# Patient Record
Sex: FEMALE | Race: WHITE | NOT HISPANIC OR LATINO | Employment: OTHER | URBAN - METROPOLITAN AREA
[De-identification: names, ages, dates, MRNs, and addresses within clinical notes are randomized per-mention and may not be internally consistent; named-entity substitution may affect disease eponyms.]

---

## 2017-02-03 ENCOUNTER — HOSPITAL ENCOUNTER (OUTPATIENT)
Dept: CT IMAGING | Facility: HOSPITAL | Age: 82
Discharge: HOME | End: 2017-02-03
Attending: INTERNAL MEDICINE | Admitting: INTERNAL MEDICINE

## 2017-02-03 ENCOUNTER — TRANSCRIBE ORDERS (OUTPATIENT)
Dept: ADMINISTRATIVE | Facility: HOSPITAL | Age: 82
End: 2017-02-03

## 2017-02-03 DIAGNOSIS — S09.90XA HEAD TRAUMA, INITIAL ENCOUNTER: ICD-10-CM

## 2017-02-03 DIAGNOSIS — S09.90XA HEAD TRAUMA, INITIAL ENCOUNTER: Primary | ICD-10-CM

## 2017-02-03 PROCEDURE — 70450 CT HEAD/BRAIN W/O DYE: CPT

## 2017-02-03 NOTE — NURSING NOTE
Spoke with Dr. Painter regarding results of patients head CT. Results called to Dr. Dempsey. Dr. Dempsey stated patient may leave and for her to be careful. Info relayed to patient, home per self. No distress.

## 2017-06-08 ENCOUNTER — HOSPITAL ENCOUNTER (OUTPATIENT)
Dept: GENERAL RADIOLOGY | Facility: HOSPITAL | Age: 82
Discharge: HOME | End: 2017-06-08
Admitting: INTERNAL MEDICINE

## 2017-06-08 DIAGNOSIS — R52 PAIN: ICD-10-CM

## 2017-06-08 PROCEDURE — 73030 X-RAY EXAM OF SHOULDER: CPT

## 2017-09-01 ENCOUNTER — OFFICE VISIT (OUTPATIENT)
Dept: CARDIOLOGY | Facility: CLINIC | Age: 82
End: 2017-09-01

## 2017-09-01 VITALS
HEART RATE: 73 BPM | DIASTOLIC BLOOD PRESSURE: 78 MMHG | HEIGHT: 60 IN | SYSTOLIC BLOOD PRESSURE: 150 MMHG | WEIGHT: 143 LBS | BODY MASS INDEX: 28.07 KG/M2

## 2017-09-01 DIAGNOSIS — R06.09 DYSPNEA ON EXERTION: Primary | ICD-10-CM

## 2017-09-01 DIAGNOSIS — A15.9 TB (TUBERCULOSIS): ICD-10-CM

## 2017-09-01 DIAGNOSIS — I35.0 NONRHEUMATIC AORTIC VALVE STENOSIS: Chronic | ICD-10-CM

## 2017-09-01 PROCEDURE — 93000 ELECTROCARDIOGRAM COMPLETE: CPT | Performed by: INTERNAL MEDICINE

## 2017-09-01 PROCEDURE — 99203 OFFICE O/P NEW LOW 30 MIN: CPT | Performed by: INTERNAL MEDICINE

## 2017-09-01 NOTE — PROGRESS NOTES
Subjective:     Encounter Date:09/01/2017      Patient ID: Shaina Mcknight is a 89 y.o. female.    Chief Complaint:  History of Present Illness    Dear Dr. Shankar,    I had the pleasure seeing this patient in the office today for consultation regarding dyspnea.  I appreciate the you sent her to see us.    She was apparently seen in the office years ago but we could find no records she is not sure how many years back that was.  She states at that time she was told everything looks okay.    She's been having some more issues with shortness of breath.  She states that if she does things and tries to walk around she gets short of breath.  It's gradually been getting worse.  She has no associated chest pain, pressure, tightness, squeezing, or heartburn.  She's not have children.  She has had an occasional cough.  She has a history of tuberculosis in the past.    She really was discussing this with you back in November and you arrange for a stress echocardiogram to be performed.  This was a normal study that simply demonstrated mild aortic stenosis:  Interpretation Summary   · Normal stress echo with no significant echocardiographic evidence for myocardial ischemia.  · Mild aortic valve stenosis is present.         This patient has no known cardiac history.  This patient has no history of coronary artery disease, congestive heart failure, rheumatic fever, rheumatic heart disease, congenital heart disease or arrhythmia.  This patient has never required invasive cardiovascular evaluation.    She denies any lower extremity edema.  No orthopnea or PND.  No spells of heart racing or palpitations.  No dizziness or lightheadedness, presyncope or syncope.    The following portions of the patient's history were reviewed and updated as appropriate: allergies, current medications, past family history, past medical history, past social history, past surgical history and problem list.    Past Medical History:   Diagnosis Date    • Acute pain of left shoulder due to trauma    • Aortic valve regurgitation     trace   • Aortic valve stenosis     mild   • SPENCER (dyspnea on exertion)    • Fall     going up the steps   • Heart murmur    • Mitral valve regurgitation     mild to moderate   • Pulmonary valve regurgitation     trace   • Tricuspid valve regurgitation     mild to moderate       Past Surgical History:   Procedure Laterality Date   • CATARACT EXTRACTION Bilateral    • HYSTERECTOMY         Social History     Social History   • Marital status:      Spouse name: N/A   • Number of children: N/A   • Years of education: N/A     Occupational History   • Not on file.     Social History Main Topics   • Smoking status: Never Smoker   • Smokeless tobacco: Not on file      Comment: caffine use   • Alcohol use 0.6 oz/week     1 Glasses of wine per week      Comment: daily   • Drug use: No   • Sexual activity: Not on file     Other Topics Concern   • Not on file     Social History Narrative       Review of Systems   Constitution: Negative for chills, decreased appetite, fever and night sweats.   HENT: Negative for ear discharge, ear pain, hearing loss, nosebleeds and sore throat.    Eyes: Negative for blurred vision, double vision and pain.   Cardiovascular: Negative for cyanosis.   Respiratory: Negative for hemoptysis and sputum production.    Endocrine: Negative for cold intolerance and heat intolerance.   Hematologic/Lymphatic: Negative for adenopathy.   Skin: Negative for dry skin, itching, nail changes, rash and suspicious lesions.   Musculoskeletal: Negative for arthritis, gout, muscle cramps, muscle weakness, myalgias and neck pain.   Gastrointestinal: Negative for anorexia, bowel incontinence, constipation, diarrhea, dysphagia, hematemesis and jaundice.   Genitourinary: Negative for bladder incontinence, dysuria, flank pain, frequency, hematuria and nocturia.   Neurological: Negative for focal weakness, numbness, paresthesias and  "seizures.   Psychiatric/Behavioral: Negative for altered mental status, hallucinations, hypervigilance, suicidal ideas and thoughts of violence.   Allergic/Immunologic: Negative for persistent infections.         ECG 12 Lead  Date/Time: 9/1/2017 9:58 AM  Performed by: WILD WOLF III.  Authorized by: WILD WOLF III   Comparison: compared with previous ECG   Similar to previous ECG  Rhythm: sinus rhythm  Rate: normal  Conduction: conduction normal  ST Segments: ST segments normal  T Waves: T waves normal  QRS axis: normal  Other: no other findings  Clinical impression: normal ECG               Objective:     Vitals:    09/01/17 0837   BP: 150/78   Pulse: 73   Weight: 143 lb (64.9 kg)   Height: 60\" (152.4 cm)         Physical Exam   Constitutional: She is oriented to person, place, and time. She appears well-developed and well-nourished. No distress.   HENT:   Head: Normocephalic and atraumatic.   Nose: Nose normal.   Mouth/Throat: Oropharynx is clear and moist.   Eyes: Conjunctivae and EOM are normal. Pupils are equal, round, and reactive to light. Right eye exhibits no discharge. Left eye exhibits no discharge.   Neck: Normal range of motion. Neck supple. No tracheal deviation present. No thyromegaly present.   Cardiovascular: Normal rate, regular rhythm, S1 normal, S2 normal and normal pulses.  Exam reveals no S3.    Murmur heard.   Harsh midsystolic murmur is present with a grade of 1/6  at the upper right sternal border radiating to the neck  Pulmonary/Chest: Effort normal and breath sounds normal. No stridor. No respiratory distress. She exhibits no tenderness.   Abdominal: Soft. Bowel sounds are normal. She exhibits no distension and no mass. There is no tenderness. There is no rebound and no guarding.   Musculoskeletal: Normal range of motion. She exhibits no tenderness or deformity.   Lymphadenopathy:     She has no cervical adenopathy.   Neurological: She is alert and oriented to person, place, and " time. She has normal reflexes.   Skin: Skin is warm and dry. No rash noted. She is not diaphoretic. No erythema.   Psychiatric: She has a normal mood and affect. Thought content normal.       Lab Review:             Performed        Assessment:          Diagnosis Plan   1. Dyspnea on exertion  CT Chest Without Contrast   2. TB (tuberculosis)  CT Chest Without Contrast   3. Nonrheumatic aortic valve stenosis            Plan:     this patient continues to complain of worsening dyspnea.  Stress echocardiogram showed no cardiac etiology at all.  She reports a history of TB, and she wonders whether that could be playing a role.  She's not had a CT of her chest at any time that I can say; we will arrange for this to be performed.    Thank you very much for allowing us to participate in the care of this pleasant patient.  Please don't hesitate to call if I can be of assistance in any way.      Current Outpatient Prescriptions:   •  aspirin 81 MG tablet, Take 81 mg by mouth Daily., Disp: , Rfl:   •  Calcium Carbonate (CALCIUM 600) 1500 (600 Ca) MG tablet, Take 600 mg by mouth Daily., Disp: , Rfl:   •  IRON PO, Take  by mouth., Disp: , Rfl:   •  Misc Natural Products (OSTEO BI-FLEX ADV TRIPLE ST PO), Take 1 tablet by mouth Daily., Disp: , Rfl:   •  MULTIPLE VITAMIN PO, Take 1 tablet by mouth Daily., Disp: , Rfl:   •  vitamin B-12 (CYANOCOBALAMIN) 1000 MCG tablet, Take 1,000 mcg by mouth Daily., Disp: , Rfl:   •  Vitamin D, Cholecalciferol, 1000 units capsule, Take 1 capsule by mouth Daily., Disp: , Rfl:

## 2017-09-13 ENCOUNTER — HOSPITAL ENCOUNTER (OUTPATIENT)
Dept: CT IMAGING | Facility: HOSPITAL | Age: 82
Discharge: HOME | End: 2017-09-13
Attending: INTERNAL MEDICINE | Admitting: INTERNAL MEDICINE

## 2017-09-13 DIAGNOSIS — A15.9 TB (TUBERCULOSIS): ICD-10-CM

## 2017-09-13 DIAGNOSIS — R06.09 DYSPNEA ON EXERTION: ICD-10-CM

## 2017-09-13 PROCEDURE — 71250 CT THORAX DX C-: CPT

## 2018-11-04 ENCOUNTER — HOSPITAL ENCOUNTER (INPATIENT)
Facility: HOSPITAL | Age: 83
LOS: 7 days | Discharge: NURSING, REHAB OR LONG-TERM CARE | End: 2018-11-11
Attending: INTERNAL MEDICINE | Admitting: INTERNAL MEDICINE

## 2018-11-04 ENCOUNTER — APPOINTMENT (OUTPATIENT)
Dept: GENERAL RADIOLOGY | Facility: HOSPITAL | Age: 83
End: 2018-11-04

## 2018-11-04 PROBLEM — J18.9 COMMUNITY ACQUIRED PNEUMONIA OF RIGHT LOWER LOBE OF LUNG: Status: ACTIVE | Noted: 2018-11-04

## 2018-11-04 PROBLEM — J15.9 BACTERIAL PNEUMONIA: Status: ACTIVE | Noted: 2018-11-04

## 2018-11-04 PROBLEM — D72.829 LEUKOCYTOSIS: Status: ACTIVE | Noted: 2018-11-04

## 2018-11-04 PROBLEM — A41.9 SEPSIS: Status: ACTIVE | Noted: 2018-11-04

## 2018-11-04 PROBLEM — J96.01 ACUTE RESPIRATORY FAILURE WITH HYPOXIA (HCC): Status: ACTIVE | Noted: 2018-11-04

## 2018-11-04 LAB
B PERT DNA SPEC QL NAA+PROBE: NOT DETECTED
C DIFF TOX GENS STL QL NAA+PROBE: NEGATIVE
C PNEUM DNA NPH QL NAA+NON-PROBE: NOT DETECTED
FLUAV H1 2009 PAND RNA NPH QL NAA+PROBE: NOT DETECTED
FLUAV H1 HA GENE NPH QL NAA+PROBE: NOT DETECTED
FLUAV H3 RNA NPH QL NAA+PROBE: NOT DETECTED
FLUAV SUBTYP SPEC NAA+PROBE: NOT DETECTED
FLUBV RNA ISLT QL NAA+PROBE: NOT DETECTED
HADV DNA SPEC NAA+PROBE: NOT DETECTED
HCOV 229E RNA SPEC QL NAA+PROBE: NOT DETECTED
HCOV HKU1 RNA SPEC QL NAA+PROBE: NOT DETECTED
HCOV NL63 RNA SPEC QL NAA+PROBE: NOT DETECTED
HCOV OC43 RNA SPEC QL NAA+PROBE: NOT DETECTED
HMPV RNA NPH QL NAA+NON-PROBE: NOT DETECTED
HPIV1 RNA SPEC QL NAA+PROBE: NOT DETECTED
HPIV2 RNA SPEC QL NAA+PROBE: NOT DETECTED
HPIV3 RNA NPH QL NAA+PROBE: NOT DETECTED
HPIV4 P GENE NPH QL NAA+PROBE: NOT DETECTED
M PNEUMO IGG SER IA-ACNC: NOT DETECTED
RHINOVIRUS RNA SPEC NAA+PROBE: NOT DETECTED
RSV RNA NPH QL NAA+NON-PROBE: DETECTED

## 2018-11-04 PROCEDURE — 87581 M.PNEUMON DNA AMP PROBE: CPT | Performed by: INTERNAL MEDICINE

## 2018-11-04 PROCEDURE — 87633 RESP VIRUS 12-25 TARGETS: CPT | Performed by: INTERNAL MEDICINE

## 2018-11-04 PROCEDURE — 71046 X-RAY EXAM CHEST 2 VIEWS: CPT

## 2018-11-04 PROCEDURE — 87798 DETECT AGENT NOS DNA AMP: CPT | Performed by: INTERNAL MEDICINE

## 2018-11-04 PROCEDURE — 25010000002 ENOXAPARIN PER 10 MG: Performed by: INTERNAL MEDICINE

## 2018-11-04 PROCEDURE — 87493 C DIFF AMPLIFIED PROBE: CPT | Performed by: HOSPITALIST

## 2018-11-04 PROCEDURE — 87486 CHLMYD PNEUM DNA AMP PROBE: CPT | Performed by: INTERNAL MEDICINE

## 2018-11-04 PROCEDURE — 63710000001 DIPHENHYDRAMINE PER 50 MG: Performed by: HOSPITALIST

## 2018-11-04 RX ORDER — MELATONIN
1000 DAILY
Status: DISCONTINUED | OUTPATIENT
Start: 2018-11-04 | End: 2018-11-11 | Stop reason: HOSPADM

## 2018-11-04 RX ORDER — ONDANSETRON 2 MG/ML
4 INJECTION INTRAMUSCULAR; INTRAVENOUS EVERY 6 HOURS PRN
Status: DISCONTINUED | OUTPATIENT
Start: 2018-11-04 | End: 2018-11-11 | Stop reason: HOSPADM

## 2018-11-04 RX ORDER — GUAIFENESIN 600 MG/1
600 TABLET, EXTENDED RELEASE ORAL EVERY 12 HOURS SCHEDULED
Status: DISCONTINUED | OUTPATIENT
Start: 2018-11-04 | End: 2018-11-06

## 2018-11-04 RX ORDER — ALBUTEROL SULFATE 2.5 MG/3ML
2.5 SOLUTION RESPIRATORY (INHALATION) EVERY 6 HOURS PRN
Status: DISCONTINUED | OUTPATIENT
Start: 2018-11-04 | End: 2018-11-11 | Stop reason: HOSPADM

## 2018-11-04 RX ORDER — ACETAMINOPHEN 325 MG/1
650 TABLET ORAL EVERY 4 HOURS PRN
Status: DISCONTINUED | OUTPATIENT
Start: 2018-11-04 | End: 2018-11-11 | Stop reason: HOSPADM

## 2018-11-04 RX ORDER — ALBUTEROL SULFATE 2.5 MG/3ML
2.5 SOLUTION RESPIRATORY (INHALATION) EVERY 6 HOURS PRN
Status: DISCONTINUED | OUTPATIENT
Start: 2018-11-04 | End: 2018-11-04

## 2018-11-04 RX ORDER — ONDANSETRON 4 MG/1
4 TABLET, ORALLY DISINTEGRATING ORAL EVERY 6 HOURS PRN
Status: DISCONTINUED | OUTPATIENT
Start: 2018-11-04 | End: 2018-11-11 | Stop reason: HOSPADM

## 2018-11-04 RX ORDER — SODIUM CHLORIDE 0.9 % (FLUSH) 0.9 %
3-10 SYRINGE (ML) INJECTION AS NEEDED
Status: DISCONTINUED | OUTPATIENT
Start: 2018-11-04 | End: 2018-11-11 | Stop reason: HOSPADM

## 2018-11-04 RX ORDER — DIPHENHYDRAMINE HCL 25 MG
25 CAPSULE ORAL NIGHTLY PRN
Status: DISCONTINUED | OUTPATIENT
Start: 2018-11-04 | End: 2018-11-11 | Stop reason: HOSPADM

## 2018-11-04 RX ORDER — ONDANSETRON 4 MG/1
4 TABLET, FILM COATED ORAL EVERY 6 HOURS PRN
Status: DISCONTINUED | OUTPATIENT
Start: 2018-11-04 | End: 2018-11-11 | Stop reason: HOSPADM

## 2018-11-04 RX ORDER — SODIUM CHLORIDE 0.9 % (FLUSH) 0.9 %
3 SYRINGE (ML) INJECTION EVERY 12 HOURS SCHEDULED
Status: DISCONTINUED | OUTPATIENT
Start: 2018-11-04 | End: 2018-11-11 | Stop reason: HOSPADM

## 2018-11-04 RX ORDER — SODIUM CHLORIDE 9 MG/ML
75 INJECTION, SOLUTION INTRAVENOUS CONTINUOUS
Status: DISCONTINUED | OUTPATIENT
Start: 2018-11-04 | End: 2018-11-07

## 2018-11-04 RX ORDER — ASPIRIN 81 MG/1
81 TABLET ORAL DAILY
Status: DISCONTINUED | OUTPATIENT
Start: 2018-11-05 | End: 2018-11-11 | Stop reason: HOSPADM

## 2018-11-04 RX ORDER — LEVOFLOXACIN 5 MG/ML
500 INJECTION, SOLUTION INTRAVENOUS EVERY 24 HOURS
Status: COMPLETED | OUTPATIENT
Start: 2018-11-05 | End: 2018-11-11

## 2018-11-04 RX ORDER — BENZONATATE 100 MG/1
200 CAPSULE ORAL 3 TIMES DAILY PRN
Status: DISCONTINUED | OUTPATIENT
Start: 2018-11-04 | End: 2018-11-09

## 2018-11-04 RX ORDER — CHOLECALCIFEROL (VITAMIN D3) 125 MCG
1000 CAPSULE ORAL DAILY
Status: DISCONTINUED | OUTPATIENT
Start: 2018-11-04 | End: 2018-11-11 | Stop reason: HOSPADM

## 2018-11-04 RX ADMIN — DIPHENHYDRAMINE HYDROCHLORIDE 25 MG: 25 CAPSULE ORAL at 20:39

## 2018-11-04 RX ADMIN — ENOXAPARIN SODIUM 40 MG: 40 INJECTION SUBCUTANEOUS at 18:16

## 2018-11-04 RX ADMIN — Medication 3 ML: at 20:39

## 2018-11-04 RX ADMIN — Medication 1000 MCG: at 18:16

## 2018-11-04 RX ADMIN — VITAMIN D, TAB 1000IU (100/BT) 1000 UNITS: 25 TAB at 18:16

## 2018-11-04 RX ADMIN — SODIUM CHLORIDE 75 ML/HR: 9 INJECTION, SOLUTION INTRAVENOUS at 18:17

## 2018-11-04 RX ADMIN — HYDROCODONE POLISTIREX AND CHLORPHENIRAMINE POLISITREX 5 ML: 10; 8 SUSPENSION, EXTENDED RELEASE ORAL at 20:39

## 2018-11-04 RX ADMIN — GUAIFENESIN 600 MG: 600 TABLET, EXTENDED RELEASE ORAL at 20:39

## 2018-11-04 NOTE — PLAN OF CARE
Problem: Patient Care Overview  Goal: Plan of Care Review  Outcome: Ongoing (interventions implemented as appropriate)   11/04/18 2354   Coping/Psychosocial   Plan of Care Reviewed With patient   Plan of Care Review   Progress improving   OTHER   Outcome Summary Pt A&Ox4, 88% on RA, 93% 2 L, (RA at home), pt lives with son, drives and runs errands indepentently,  passed away 2 weeks ago, pt states diarrhea for month and a half r/t stress, 15 lb weight loss, daughters at bs, will monitor.        Problem: Skin Injury Risk (Adult)  Goal: Identify Related Risk Factors and Signs and Symptoms  Outcome: Outcome(s) achieved Date Met: 11/04/18    Goal: Skin Health and Integrity  Outcome: Ongoing (interventions implemented as appropriate)      Problem: Infection, Risk/Actual (Adult)  Goal: Identify Related Risk Factors and Signs and Symptoms  Outcome: Outcome(s) achieved Date Met: 11/04/18    Goal: Infection Prevention/Resolution  Outcome: Ongoing (interventions implemented as appropriate)

## 2018-11-04 NOTE — H&P
Patient Name:  Shaina Mcknight  YOB: 1927  MRN:  [id number]  Admit Date:  11/4/2018  Patient Care Team:  Fanta Shankar MD as PCP - General  Fanta Shankar MD as PCP - Family Medicine  Fanta Shankar MD as PCP - Claims Attributed      No chief complaint on file.  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  She was placed on azithromycin but due to difficulty breathing she presented to [facility] earlier today.  There they diagnosed her with pneumonia and she was transferred to us for further care.  Symptoms started 3 days ago.  Symptoms are worse with exertion and there are no relieving factors.  The patient was not hypoxic but her O2 saturations were low at 93% when she arrived, low of 90% on room air.    At the outside hospital she received albuterol nebulizer, 500 mg of Tequin, Tylenol and Robitussin.  Labs revealed a sodium of 129, creatinine of 1.08, white blood cell count of 17.4.  Asked x-ray is outside hospital showed faint patch of pneumonia in the right lower lobe.    Of note, the patient had been taking care of her  for the last 4-6 weeks and subsequently developed diarrhea, fatigue.  He passed away 2 weeks ago.  The patient's diarrhea has now resolved.  History of Present Illness    Past Medical History:   Diagnosis Date   • Acute pain of left shoulder due to trauma    • Aortic valve regurgitation     trace   • Aortic valve stenosis     mild   • SPENCER (dyspnea on exertion)    • Fall     going up the steps   • Heart murmur    • Mitral valve regurgitation     mild to moderate   • Pulmonary valve regurgitation     trace   • Tricuspid valve regurgitation     mild to moderate     Past Surgical History:   Procedure Laterality Date   • CATARACT EXTRACTION Bilateral    •  HYSTERECTOMY       Family History   Problem Relation Age of Onset   • Stroke Mother    • Cancer Mother    • Heart attack Father    • Heart disease Father    • Sudden death Father    • Heart attack Brother    • Heart disease Brother    • Sudden death Brother    • Cancer Son      Social History   Substance Use Topics   • Smoking status: Never Smoker   • Smokeless tobacco: Never Used      Comment: caffine use   • Alcohol use 0.6 oz/week     1 Glasses of wine per week      Comment: daily     Prescriptions Prior to Admission   Medication Sig Dispense Refill Last Dose   • aspirin 81 MG tablet Take 81 mg by mouth Daily.   Taking   • azithromycin (ZITHROMAX Z-ROSIE) 250 MG tablet Take 2 tablets at the same time Day 1 and then 1 tablet every 24 hour thereafter. 6 tablet 0    • benzonatate (TESSALON) 200 MG capsule Take 1 capsule by mouth 3 (Three) Times a Day As Needed for Cough for up to 10 days. 30 capsule 0    • Calcium Carbonate (CALCIUM 600) 1500 (600 Ca) MG tablet Take 600 mg by mouth Daily.   Taking   • IRON PO Take  by mouth.   Taking   • Misc Natural Products (OSTEO BI-FLEX ADV TRIPLE ST PO) Take 1 tablet by mouth Daily.   Taking   • MULTIPLE VITAMIN PO Take 1 tablet by mouth Daily.   Taking   • vitamin B-12 (CYANOCOBALAMIN) 1000 MCG tablet Take 1,000 mcg by mouth Daily.   Taking   • Vitamin D, Cholecalciferol, 1000 units capsule Take 1 capsule by mouth Daily.   Taking     Allergies:    Allergies   Allergen Reactions   • Molds & Smuts Shortness Of Breath   • Penicillins Hives   • Oyster Shell        Review of Systems   Constitutional: Positive for activity change, appetite change, fatigue, fever and unexpected weight change (15 pounds while taking care of her ).   HENT: Positive for congestion and sore throat. Negative for nosebleeds and trouble swallowing.    Eyes: Negative for pain and visual disturbance.   Respiratory: Positive for cough, chest tightness, shortness of breath and wheezing.    Cardiovascular:  Negative for chest pain, palpitations and leg swelling.   Gastrointestinal: Positive for diarrhea (Now resolved). Negative for abdominal pain, constipation, nausea and vomiting.   Endocrine:        Negative for Diabetes or thyroid disease   Genitourinary: Negative for difficulty urinating and hematuria.   Musculoskeletal: Negative.  Negative for back pain, neck pain and neck stiffness.   Skin: Negative for rash and wound.   Neurological: Positive for weakness. Negative for dizziness, syncope, light-headedness and headaches.   Hematological: Negative for adenopathy. Does not bruise/bleed easily.   Psychiatric/Behavioral: Negative for agitation, behavioral problems and confusion.        Objective    Vital Signs  Temp:  [98.8 °F (37.1 °C)] 98.8 °F (37.1 °C)  Heart Rate:  [89] 89  Resp:  [18] 18  BP: (129)/(71) 129/71  SpO2:  [88 %-93 %] 93 %  on   ;   Device (Oxygen Therapy): room air  Body mass index is 24.7 kg/m².    Physical Exam   Constitutional: She is oriented to person, place, and time. She appears well-developed and well-nourished. No distress.   HENT:   Head: Normocephalic and atraumatic.   Mouth/Throat: No oropharyngeal exudate.   Dry oropharynx   Eyes: Pupils are equal, round, and reactive to light. Conjunctivae and EOM are normal. No scleral icterus.   Neck: Normal range of motion. Neck supple. No JVD present. No thyromegaly present.   Cardiovascular: Normal rate and regular rhythm.  Exam reveals no gallop and no friction rub.    Murmur (2/6 systolic) heard.  Pulmonary/Chest: No stridor. Tachypnea noted. She has no decreased breath sounds. She has wheezes (Throughout). She has rales (Throughout all lung fields posteriorly but worse in the right lower lobe).   Abdominal: Soft. Bowel sounds are normal. She exhibits no distension. There is no tenderness. There is no rebound and no guarding.   Musculoskeletal: She exhibits no edema or tenderness.   Lymphadenopathy:     She has no cervical adenopathy.    Neurological: She is alert and oriented to person, place, and time. No cranial nerve deficit.   Skin: Skin is warm and dry.   Psychiatric: She has a normal mood and affect. Her behavior is normal.   Vitals reviewed.      Results Review:  I reviewed the patient's new clinical results.  I reviewed the patient's new imaging results and agree with the interpretation.  I reviewed the patient's other test results and agree with the interpretation  I personally viewed and interpreted the patient's EKG/Telemetry data  Discussed with ED provider from [facility].  I reviewed records including labs, documentation, x-ray findings from [facility]      Lab Results (last 24 hours)     ** No results found for the last 24 hours. **          Imaging Results (last 24 hours)     ** No results found for the last 24 hours. **          No orders to display     Assessment/Plan   Active Hospital Problems    Diagnosis Date Noted   • **Community acquired pneumonia of right lower lobe of lung (CMS/[facility]) [J18.1] 11/04/2018   • Leukocytosis [D72.829] 11/04/2018   • Acute respiratory failure with hypoxia (CMS/[facility]) [J96.01] 11/04/2018   • Bacterial pneumonia [J15.9] 11/04/2018   • Nonrheumatic aortic valve stenosis [I35.0] 09/01/2017      Resolved Hospital Problems    Diagnosis Date Noted Date Resolved   No resolved problems to display.       Ms. Mcknight is a 89 y.o. female  with a history of tuberculosis in her 20s  who presented to outside hospital emergency room for pneumonia.    Community acquired pneumonia: I suspect she has had a viral bronchitis and now a secondary bacterial infection.  We'll obtain a respiratory viral panel, strep pneumo and legionella antigens.  We'll continue with Levaquin.  I've asked to repeat a chest x-ray now given her worsening hypoxia and rails throughout.  She does meet sepsis criteria with fever 101.4 at home and leukocytosis of 17,000.    The patient has remote history of TB 60 years ago but while we  wait infection control to see her we'll keep her on airborne precautions.      Suspect hyponatremia is related to dehydration and from her pneumonia itself.  Started on IV fluids and recheck in the morning.    The patient's daughter, Betzy, a healthcare surrogate and she wishes to be a full code.    I discussed the patients findings and my recommendations with patient, family, nursing staff and consulting provider.      Norm Nova MD  [facility]  11/04/18  4:45 PM

## 2018-11-05 LAB
ANION GAP SERPL CALCULATED.3IONS-SCNC: 10.6 MMOL/L
BASOPHILS # BLD AUTO: 0.03 10*3/MM3 (ref 0–0.2)
BASOPHILS NFR BLD AUTO: 0.2 % (ref 0–1.5)
BUN BLD-MCNC: 15 MG/DL (ref 8–23)
BUN/CREAT SERPL: 18.8 (ref 7–25)
CALCIUM SPEC-SCNC: 8.3 MG/DL (ref 8.2–9.6)
CHLORIDE SERPL-SCNC: 94 MMOL/L (ref 98–107)
CO2 SERPL-SCNC: 25.4 MMOL/L (ref 22–29)
CREAT BLD-MCNC: 0.8 MG/DL (ref 0.57–1)
DEPRECATED RDW RBC AUTO: 44.8 FL (ref 37–54)
EOSINOPHIL # BLD AUTO: 0.06 10*3/MM3 (ref 0–0.7)
EOSINOPHIL NFR BLD AUTO: 0.5 % (ref 0.3–6.2)
ERYTHROCYTE [DISTWIDTH] IN BLOOD BY AUTOMATED COUNT: 13.4 % (ref 11.7–13)
GFR SERPL CREATININE-BSD FRML MDRD: 67 ML/MIN/1.73
GLUCOSE BLD-MCNC: 105 MG/DL (ref 65–99)
HCT VFR BLD AUTO: 29.4 % (ref 35.6–45.5)
HGB BLD-MCNC: 9.7 G/DL (ref 11.9–15.5)
IMM GRANULOCYTES # BLD: 0.03 10*3/MM3 (ref 0–0.03)
IMM GRANULOCYTES NFR BLD: 0.2 % (ref 0–0.5)
L PNEUMO1 AG UR QL IA: NEGATIVE
LYMPHOCYTES # BLD AUTO: 1.59 10*3/MM3 (ref 0.9–4.8)
LYMPHOCYTES NFR BLD AUTO: 13.2 % (ref 19.6–45.3)
MCH RBC QN AUTO: 30.1 PG (ref 26.9–32)
MCHC RBC AUTO-ENTMCNC: 33 G/DL (ref 32.4–36.3)
MCV RBC AUTO: 91.3 FL (ref 80.5–98.2)
MONOCYTES # BLD AUTO: 0.99 10*3/MM3 (ref 0.2–1.2)
MONOCYTES NFR BLD AUTO: 8.2 % (ref 5–12)
NEUTROPHILS # BLD AUTO: 9.36 10*3/MM3 (ref 1.9–8.1)
NEUTROPHILS NFR BLD AUTO: 77.9 % (ref 42.7–76)
PLATELET # BLD AUTO: 187 10*3/MM3 (ref 140–500)
PMV BLD AUTO: 10 FL (ref 6–12)
POTASSIUM BLD-SCNC: 3.9 MMOL/L (ref 3.5–5.2)
RBC # BLD AUTO: 3.22 10*6/MM3 (ref 3.9–5.2)
S PNEUM AG SPEC QL LA: NEGATIVE
SODIUM BLD-SCNC: 130 MMOL/L (ref 136–145)
WBC NRBC COR # BLD: 12.03 10*3/MM3 (ref 4.5–10.7)

## 2018-11-05 PROCEDURE — 25010000002 LEVOFLOXACIN PER 250 MG: Performed by: INTERNAL MEDICINE

## 2018-11-05 PROCEDURE — 94799 UNLISTED PULMONARY SVC/PX: CPT

## 2018-11-05 PROCEDURE — 87899 AGENT NOS ASSAY W/OPTIC: CPT | Performed by: INTERNAL MEDICINE

## 2018-11-05 PROCEDURE — 25010000002 ENOXAPARIN PER 10 MG: Performed by: INTERNAL MEDICINE

## 2018-11-05 PROCEDURE — 80048 BASIC METABOLIC PNL TOTAL CA: CPT | Performed by: INTERNAL MEDICINE

## 2018-11-05 PROCEDURE — 87205 SMEAR GRAM STAIN: CPT | Performed by: INTERNAL MEDICINE

## 2018-11-05 PROCEDURE — 63710000001 DIPHENHYDRAMINE PER 50 MG: Performed by: HOSPITALIST

## 2018-11-05 PROCEDURE — 94640 AIRWAY INHALATION TREATMENT: CPT

## 2018-11-05 PROCEDURE — 85025 COMPLETE CBC W/AUTO DIFF WBC: CPT | Performed by: INTERNAL MEDICINE

## 2018-11-05 PROCEDURE — 87070 CULTURE OTHR SPECIMN AEROBIC: CPT | Performed by: INTERNAL MEDICINE

## 2018-11-05 RX ORDER — LOPERAMIDE HYDROCHLORIDE 2 MG/1
2 CAPSULE ORAL 4 TIMES DAILY PRN
Status: DISCONTINUED | OUTPATIENT
Start: 2018-11-05 | End: 2018-11-11 | Stop reason: HOSPADM

## 2018-11-05 RX ORDER — IPRATROPIUM BROMIDE AND ALBUTEROL SULFATE 2.5; .5 MG/3ML; MG/3ML
3 SOLUTION RESPIRATORY (INHALATION)
Status: DISCONTINUED | OUTPATIENT
Start: 2018-11-05 | End: 2018-11-11 | Stop reason: HOSPADM

## 2018-11-05 RX ADMIN — HYDROCODONE POLISTIREX AND CHLORPHENIRAMINE POLISITREX 5 ML: 10; 8 SUSPENSION, EXTENDED RELEASE ORAL at 09:02

## 2018-11-05 RX ADMIN — LEVOFLOXACIN 500 MG: 5 INJECTION, SOLUTION INTRAVENOUS at 14:02

## 2018-11-05 RX ADMIN — ASPIRIN 81 MG: 81 TABLET, DELAYED RELEASE ORAL at 09:02

## 2018-11-05 RX ADMIN — Medication 1000 MCG: at 09:02

## 2018-11-05 RX ADMIN — IPRATROPIUM BROMIDE AND ALBUTEROL SULFATE 3 ML: 2.5; .5 SOLUTION RESPIRATORY (INHALATION) at 12:56

## 2018-11-05 RX ADMIN — IPRATROPIUM BROMIDE AND ALBUTEROL SULFATE 3 ML: 2.5; .5 SOLUTION RESPIRATORY (INHALATION) at 16:42

## 2018-11-05 RX ADMIN — GUAIFENESIN 600 MG: 600 TABLET, EXTENDED RELEASE ORAL at 09:02

## 2018-11-05 RX ADMIN — VITAMIN D, TAB 1000IU (100/BT) 1000 UNITS: 25 TAB at 09:02

## 2018-11-05 RX ADMIN — IPRATROPIUM BROMIDE AND ALBUTEROL SULFATE 3 ML: 2.5; .5 SOLUTION RESPIRATORY (INHALATION) at 19:27

## 2018-11-05 RX ADMIN — ACETAMINOPHEN 650 MG: 325 TABLET, FILM COATED ORAL at 15:00

## 2018-11-05 RX ADMIN — SODIUM CHLORIDE 75 ML/HR: 9 INJECTION, SOLUTION INTRAVENOUS at 10:33

## 2018-11-05 RX ADMIN — ALBUTEROL SULFATE 2.5 MG: 2.5 SOLUTION RESPIRATORY (INHALATION) at 09:33

## 2018-11-05 RX ADMIN — DIPHENHYDRAMINE HYDROCHLORIDE 25 MG: 25 CAPSULE ORAL at 20:42

## 2018-11-05 RX ADMIN — HYDROCODONE POLISTIREX AND CHLORPHENIRAMINE POLISITREX 5 ML: 10; 8 SUSPENSION, EXTENDED RELEASE ORAL at 20:42

## 2018-11-05 RX ADMIN — Medication 3 ML: at 09:00

## 2018-11-05 RX ADMIN — ENOXAPARIN SODIUM 40 MG: 40 INJECTION SUBCUTANEOUS at 17:08

## 2018-11-05 RX ADMIN — GUAIFENESIN 600 MG: 600 TABLET, EXTENDED RELEASE ORAL at 21:44

## 2018-11-05 RX ADMIN — ACETAMINOPHEN 650 MG: 325 TABLET, FILM COATED ORAL at 23:51

## 2018-11-05 NOTE — PROGRESS NOTES
Name: Shaina Mcknight ADMIT: 2018   : 10/4/1927  PCP: Fanta Shankar MD    MRN: [id number] LOS: 1 days   AGE/SEX: 89 y.o. female  ROOM: [facility]   Subjective     States she feels better.  Breathing is subjectively improving but still needing oxygen, coughing up yellow sputum, needing nebulizers.  Denies chest pain or palpitations.  No nausea or vomiting.  Had diarrhea return last night with C. difficile was negative.    Objective   Vital Signs  Temp:  [98.8 °F (37.1 °C)-100 °F (37.8 °C)] 100 °F (37.8 °C)  Heart Rate:  [84-91] 90  Resp:  [18] 18  BP: (123-140)/(56-71) 140/56  SpO2:  [88 %-95 %] 92 %  on  Flow (L/min):  [2] 2;   Device (Oxygen Therapy): nasal cannula  Body mass index is 26.62 kg/m².    Physical Exam   Constitutional: She is oriented to person, place, and time. She appears well-developed and well-nourished. No distress.   HENT:   Head: Normocephalic and atraumatic.   Neck: No JVD present.   Cardiovascular: Normal rate and regular rhythm.  Exam reveals no friction rub.    No murmur heard.  Pulmonary/Chest: No stridor. Tachypnea noted. She has no decreased breath sounds. She has wheezes. She has rales.   Abnormal lung sounds throughout.   Abdominal: Soft. Bowel sounds are normal. She exhibits no distension. There is no tenderness.   Musculoskeletal: She exhibits no edema or tenderness.   Neurological: She is alert and oriented to person, place, and time.   Skin: She is not diaphoretic. No pallor.   Psychiatric: She has a normal mood and affect. Her behavior is normal.   Vitals reviewed.      Results Review:       I reviewed the patient's new clinical results.    Results from last 7 days  Lab Units 18  0720   WBC 10*3/mm3 12.03*   HEMOGLOBIN g/dL 9.7*   PLATELETS 10*3/mm3 187     Results from last 7 days  Lab Units 18  0720   SODIUM mmol/L 130*   POTASSIUM mmol/L 3.9   CHLORIDE mmol/L 94*   CO2 mmol/L 25.4   BUN mg/dL 15   CREATININE mg/dL 0.80   GLUCOSE mg/dL 105*   Estimated  Creatinine Clearance: 39.2 mL/min (by C-G formula based on SCr of 0.8 mg/dL).    Results from last 7 days  Lab Units 11/05/18  0720   CALCIUM mg/dL 8.3           aspirin 81 mg Oral Daily   cholecalciferol 1,000 Units Oral Daily   enoxaparin 40 mg Subcutaneous Q24H   guaiFENesin 600 mg Oral Q12H   levoFLOXacin 500 mg Intravenous Q24H   sodium chloride 3 mL Intravenous Q12H   vitamin B-12 1,000 mcg Oral Daily       sodium chloride 75 mL/hr Last Rate: 75 mL/hr (11/05/18 1033)   Diet Regular      Assessment/Plan      Active Hospital Problems    Diagnosis Date Noted   • **Community acquired pneumonia of right lower lobe of lung (CMS/[facility]) [J18.1] 11/04/2018   • Leukocytosis [D72.829] 11/04/2018   • Acute respiratory failure with hypoxia (CMS/[facility]) [J96.01] 11/04/2018   • Bacterial pneumonia [J15.9] 11/04/2018   • Sepsis (CMS/[facility]) [A41.9] 11/04/2018   • Nonrheumatic aortic valve stenosis [I35.0] 09/01/2017      Resolved Hospital Problems    Diagnosis Date Noted Date Resolved   No resolved problems to display.     Ms. Mcknight is a 89 y.o. female  with a history of tuberculosis in her 20s (treated) who presented to outside hospital emergency room for pneumonia.     Community acquired pneumonia:She has had a viral bronchitis with RSV and now a secondary bacterial infection. Strep pneumo and legionella antigens are negative.  We'll continue with Levaquin for 7 days (D2).  I've asked to repeat a chest x-ray now given her worsening hypoxia and rails throughout.  She does meet sepsis criteria with fever 101.4 at home and leukocytosis of 17,000.  -Continue when necessary albuterol but had scheduled duo nebs     The patient has remote history of TB 60 years ago but while we wait infection control to see her we'll keep her on airborne precautions.       Suspect hyponatremia is related to dehydration and from her pneumonia itself.  Cont IV fluids and recheck in the morning.     Diarrhea: Ongoing for about 1 month.  Suspect the  worsening diarrhea overnight was from antibiotics.  C. difficile negative. Add imodium prn    The patient's daughter, Betzy, a healthcare surrogate and she wishes to be a full code.        Norm Nova MD  [facility]  11/05/18  10:58 AM

## 2018-11-05 NOTE — PLAN OF CARE
Problem: Patient Care Overview  Goal: Plan of Care Review  Outcome: Ongoing (interventions implemented as appropriate)   11/05/18 0532   Coping/Psychosocial   Plan of Care Reviewed With patient   Plan of Care Review   Progress improving   OTHER   Outcome Summary VSS, am labs, cough meds PRN, BT's, TCDB, O2 2L, encourage ambulation, RSV+, sputum pending, IVF

## 2018-11-05 NOTE — CONSULTS
11-5-18: IP consulted regarding need for Airbourne isolation. Patient has a history of treated Mtb from approximately 70 years ago. Presents with 3 days respiratory symptoms, also weight loss. ID MD Maribeth Ramírez consulted and stated Airbourne could be discontinued if patient had treatment in the past as s/s of only 3 days and CXR not indicative of MTb.  RN notified to discontinue Airbourne precautions but to continue Contact precautions for current RSV. Dinorah Dominguez RN

## 2018-11-05 NOTE — PLAN OF CARE
Problem: Patient Care Overview  Goal: Plan of Care Review   11/05/18 5585   Coping/Psychosocial   Plan of Care Reviewed With patient   Plan of Care Review   Progress improving   OTHER   Outcome Summary VSS, no pain, no falls, PRN cough med x1, 2-3L per n/c, IV ABX per order, will CTM

## 2018-11-05 NOTE — PROGRESS NOTES
Discharge Planning Assessment  [facility]     Patient Name: Shaina Mcknight  MRN: [id number]  Today's Date: 11/5/2018    Admit Date: 11/4/2018          Discharge Needs Assessment     Row Name 11/05/18 1723       Living Environment    Lives With child(mae), adult    Current Living Arrangements home/apartment/condo    Primary Care Provided by self    Provides Primary Care For no one    Family Caregiver if Needed child(mae), adult    Quality of Family Relationships involved;supportive;helpful    Able to Return to Prior Arrangements yes       Resource/Environmental Concerns    Resource/Environmental Concerns none    Home Accessibility Concerns stairs to enter home   3    Transportation Concerns car, none       Transition Planning    Transportation Anticipated family or friend will provide       Discharge Needs Assessment    Readmission Within the Last 30 Days no previous admission in last 30 days    Equipment Currently Used at Home none    Anticipated Changes Related to Illness other (see comments)    Equipment Needed After Discharge oxygen    Discharge Facility/Level of Care Needs home with home health;other (see comments)    Offered/Gave Vendor List yes    Current Discharge Risk chronically ill            Discharge Plan     Row Name 11/05/18 0698       Plan    Plan Home with family support- follow for additional dc needs.    Patient/Family in Agreement with Plan yes    Plan Comments Spoke with patient at bedside along with POA/dtr Kathleen Carpenter ([phone]), introduced self and explained CCP role. Verified facesheet and confirmed local pharmacy is [facility] on [address]. Pt was IADL prior to admission and lives with her son Ketan. Pt states she has good family support at dc. Pt denies any DME, HH or SNF. CCP reviewed SNF vs HH service and provided resources and CCP contact info. CCP will continue to follow. chet ariza/ccp        Destination     No service coordination in this encounter.      Durable  Medical Equipment     No service coordination in this encounter.      Dialysis/Infusion     No service coordination in this encounter.      Home Medical Care     No service coordination in this encounter.      Social Care     No service coordination in this encounter.                Demographic Summary     Row Name 11/05/18 172       General Information    Admission Type inpatient    Referral Source admission list    Reason for Consult discharge planning    Preferred Language English     Used During This Interaction no       Contact Information    Permission Granted to Share Info With family/designee            Functional Status     Row Name 11/05/18 1729       Functional Status    Usual Activity Tolerance good    Current Activity Tolerance moderate       Functional Status, IADL    Medications independent    Meal Preparation independent    Housekeeping independent    Laundry independent    Shopping independent       Mental Status    General Appearance WDL WDL       Mental Status Summary    Recent Changes in Mental Status/Cognitive Functioning no changes       Employment/    Employment Status retired            Psychosocial    No documentation.           Abuse/Neglect    No documentation.           Legal     Row Name 11/05/18 1286       Financial/Legal    Finance Comments dtr reggie is POA            Substance Abuse    No documentation.           Patient Forms     Row Name 11/05/18 1724       Patient Forms    Provider Choice List Delivered    Delivered to Patient    Method of delivery In person          Andreia Bhatia RN

## 2018-11-06 ENCOUNTER — APPOINTMENT (OUTPATIENT)
Dept: CARDIOLOGY | Facility: HOSPITAL | Age: 83
End: 2018-11-06
Attending: INTERNAL MEDICINE

## 2018-11-06 LAB
ANION GAP SERPL CALCULATED.3IONS-SCNC: 9.2 MMOL/L
BASOPHILS # BLD AUTO: 0.03 10*3/MM3 (ref 0–0.2)
BASOPHILS NFR BLD AUTO: 0.3 % (ref 0–1.5)
BH CV ECHO MEAS - ACS: 1.5 CM
BH CV ECHO MEAS - AO MAX PG: 31 MMHG
BH CV ECHO MEAS - AO MEAN PG (FULL): 15 MMHG
BH CV ECHO MEAS - AO MEAN PG: 17 MMHG
BH CV ECHO MEAS - AO ROOT AREA (BSA CORRECTED): 1.7
BH CV ECHO MEAS - AO ROOT AREA: 5.7 CM^2
BH CV ECHO MEAS - AO ROOT DIAM: 2.7 CM
BH CV ECHO MEAS - AO V2 MAX: 278 CM/SEC
BH CV ECHO MEAS - AO V2 MEAN: 187 CM/SEC
BH CV ECHO MEAS - AO V2 VTI: 53.3 CM
BH CV ECHO MEAS - AVA(I,A): 1.1 CM^2
BH CV ECHO MEAS - AVA(I,D): 1.1 CM^2
BH CV ECHO MEAS - BSA(HAYCOCK): 1.7 M^2
BH CV ECHO MEAS - BSA: 1.6 M^2
BH CV ECHO MEAS - BZI_BMI: 26.5 KILOGRAMS/M^2
BH CV ECHO MEAS - BZI_METRIC_HEIGHT: 154.9 CM
BH CV ECHO MEAS - BZI_METRIC_WEIGHT: 63.5 KG
BH CV ECHO MEAS - EDV(MOD-SP2): 69 ML
BH CV ECHO MEAS - EDV(MOD-SP4): 51 ML
BH CV ECHO MEAS - EDV(TEICH): 147.4 ML
BH CV ECHO MEAS - EF(CUBED): 64.3 %
BH CV ECHO MEAS - EF(MOD-BP): 65 %
BH CV ECHO MEAS - EF(MOD-SP2): 63.8 %
BH CV ECHO MEAS - EF(MOD-SP4): 64.7 %
BH CV ECHO MEAS - EF(TEICH): 55.3 %
BH CV ECHO MEAS - ESV(MOD-SP2): 25 ML
BH CV ECHO MEAS - ESV(MOD-SP4): 18 ML
BH CV ECHO MEAS - ESV(TEICH): 65.9 ML
BH CV ECHO MEAS - FS: 29.1 %
BH CV ECHO MEAS - IVS/LVPW: 1
BH CV ECHO MEAS - IVSD: 0.9 CM
BH CV ECHO MEAS - LAT PEAK E' VEL: 10 CM/SEC
BH CV ECHO MEAS - LV DIASTOLIC VOL/BSA (35-75): 31.4 ML/M^2
BH CV ECHO MEAS - LV MASS(C)D: 185.8 GRAMS
BH CV ECHO MEAS - LV MASS(C)DI: 114.5 GRAMS/M^2
BH CV ECHO MEAS - LV MEAN PG: 2 MMHG
BH CV ECHO MEAS - LV SYSTOLIC VOL/BSA (12-30): 11.1 ML/M^2
BH CV ECHO MEAS - LV V1 MAX: 118 CM/SEC
BH CV ECHO MEAS - LV V1 MEAN: 66.3 CM/SEC
BH CV ECHO MEAS - LV V1 VTI: 21.5 CM
BH CV ECHO MEAS - LVIDD: 5.5 CM
BH CV ECHO MEAS - LVIDS: 3.9 CM
BH CV ECHO MEAS - LVLD AP2: 6.8 CM
BH CV ECHO MEAS - LVLD AP4: 7 CM
BH CV ECHO MEAS - LVLS AP2: 5.6 CM
BH CV ECHO MEAS - LVLS AP4: 6.3 CM
BH CV ECHO MEAS - LVOT AREA (M): 2.8 CM^2
BH CV ECHO MEAS - LVOT AREA: 2.8 CM^2
BH CV ECHO MEAS - LVOT DIAM: 1.9 CM
BH CV ECHO MEAS - LVPWD: 0.9 CM
BH CV ECHO MEAS - MED PEAK E' VEL: 10 CM/SEC
BH CV ECHO MEAS - MR MAX PG: 158.8 MMHG
BH CV ECHO MEAS - MR MAX VEL: 630 CM/SEC
BH CV ECHO MEAS - MV A DUR: 0.11 SEC
BH CV ECHO MEAS - MV A MAX VEL: 141 CM/SEC
BH CV ECHO MEAS - MV DEC SLOPE: 547 CM/SEC^2
BH CV ECHO MEAS - MV DEC TIME: 0.18 SEC
BH CV ECHO MEAS - MV E MAX VEL: 105 CM/SEC
BH CV ECHO MEAS - MV E/A: 0.74
BH CV ECHO MEAS - MV MEAN PG: 5 MMHG
BH CV ECHO MEAS - MV P1/2T MAX VEL: 105 CM/SEC
BH CV ECHO MEAS - MV P1/2T: 56.2 MSEC
BH CV ECHO MEAS - MV V2 MEAN: 95.9 CM/SEC
BH CV ECHO MEAS - MV V2 VTI: 29.2 CM
BH CV ECHO MEAS - MVA P1/2T LCG: 2.1 CM^2
BH CV ECHO MEAS - MVA(P1/2T): 3.9 CM^2
BH CV ECHO MEAS - MVA(VTI): 2.1 CM^2
BH CV ECHO MEAS - PA MAX PG (FULL): 4.9 MMHG
BH CV ECHO MEAS - PA MAX PG: 7.1 MMHG
BH CV ECHO MEAS - PA V2 MAX: 133 CM/SEC
BH CV ECHO MEAS - PULM A REVS DUR: 0.11 SEC
BH CV ECHO MEAS - PULM A REVS VEL: 30.1 CM/SEC
BH CV ECHO MEAS - PULM DIAS VEL: 44.4 CM/SEC
BH CV ECHO MEAS - PULM S/D: 1.2
BH CV ECHO MEAS - PULM SYS VEL: 54.3 CM/SEC
BH CV ECHO MEAS - PVA(V,A): 1.9 CM^2
BH CV ECHO MEAS - PVA(V,D): 1.9 CM^2
BH CV ECHO MEAS - QP/QS: 0.74
BH CV ECHO MEAS - RAP SYSTOLE: 3 MMHG
BH CV ECHO MEAS - RV MAX PG: 2.2 MMHG
BH CV ECHO MEAS - RV MEAN PG: 1 MMHG
BH CV ECHO MEAS - RV V1 MAX: 73.7 CM/SEC
BH CV ECHO MEAS - RV V1 MEAN: 48 CM/SEC
BH CV ECHO MEAS - RV V1 VTI: 13 CM
BH CV ECHO MEAS - RVOT AREA: 3.5 CM^2
BH CV ECHO MEAS - RVOT DIAM: 2.1 CM
BH CV ECHO MEAS - RVSP: 28 MMHG
BH CV ECHO MEAS - SI(AO): 188 ML/M^2
BH CV ECHO MEAS - SI(CUBED): 65.9 ML/M^2
BH CV ECHO MEAS - SI(LVOT): 37.6 ML/M^2
BH CV ECHO MEAS - SI(MOD-SP2): 27.1 ML/M^2
BH CV ECHO MEAS - SI(MOD-SP4): 20.3 ML/M^2
BH CV ECHO MEAS - SI(TEICH): 50.2 ML/M^2
BH CV ECHO MEAS - SUP REN AO DIAM: 1.4 CM
BH CV ECHO MEAS - SV(AO): 305.2 ML
BH CV ECHO MEAS - SV(CUBED): 107.1 ML
BH CV ECHO MEAS - SV(LVOT): 61 ML
BH CV ECHO MEAS - SV(MOD-SP2): 44 ML
BH CV ECHO MEAS - SV(MOD-SP4): 33 ML
BH CV ECHO MEAS - SV(RVOT): 45 ML
BH CV ECHO MEAS - SV(TEICH): 81.5 ML
BH CV ECHO MEAS - TAPSE (>1.6): 2.7 CM2
BH CV ECHO MEAS - TR MAX VEL: 252 CM/SEC
BH CV ECHO MEASUREMENTS AVERAGE E/E' RATIO: 10.5
BH CV XLRA - TDI S': 24 CM/SEC
BUN BLD-MCNC: 11 MG/DL (ref 8–23)
BUN/CREAT SERPL: 15.5 (ref 7–25)
CALCIUM SPEC-SCNC: 8 MG/DL (ref 8.2–9.6)
CHLORIDE SERPL-SCNC: 98 MMOL/L (ref 98–107)
CO2 SERPL-SCNC: 22.8 MMOL/L (ref 22–29)
CREAT BLD-MCNC: 0.71 MG/DL (ref 0.57–1)
DEPRECATED RDW RBC AUTO: 45.8 FL (ref 37–54)
EOSINOPHIL # BLD AUTO: 0.2 10*3/MM3 (ref 0–0.7)
EOSINOPHIL NFR BLD AUTO: 2 % (ref 0.3–6.2)
ERYTHROCYTE [DISTWIDTH] IN BLOOD BY AUTOMATED COUNT: 13.6 % (ref 11.7–13)
FERRITIN SERPL-MCNC: 408.3 NG/ML (ref 13–150)
GFR SERPL CREATININE-BSD FRML MDRD: 77 ML/MIN/1.73
GLUCOSE BLD-MCNC: 103 MG/DL (ref 65–99)
HCT VFR BLD AUTO: 27.4 % (ref 35.6–45.5)
HGB BLD-MCNC: 8.9 G/DL (ref 11.9–15.5)
IMM GRANULOCYTES # BLD: 0.03 10*3/MM3 (ref 0–0.03)
IMM GRANULOCYTES NFR BLD: 0.3 % (ref 0–0.5)
IRON 24H UR-MRATE: 13 MCG/DL (ref 37–145)
IRON SATN MFR SERPL: 7 % (ref 20–50)
LEFT ATRIUM VOLUME INDEX: 22 ML/M2
LV EF 2D ECHO EST: 65 %
LYMPHOCYTES # BLD AUTO: 1.65 10*3/MM3 (ref 0.9–4.8)
LYMPHOCYTES NFR BLD AUTO: 16.2 % (ref 19.6–45.3)
MCH RBC QN AUTO: 29.9 PG (ref 26.9–32)
MCHC RBC AUTO-ENTMCNC: 32.5 G/DL (ref 32.4–36.3)
MCV RBC AUTO: 91.9 FL (ref 80.5–98.2)
MONOCYTES # BLD AUTO: 0.9 10*3/MM3 (ref 0.2–1.2)
MONOCYTES NFR BLD AUTO: 8.8 % (ref 5–12)
NEUTROPHILS # BLD AUTO: 7.41 10*3/MM3 (ref 1.9–8.1)
NEUTROPHILS NFR BLD AUTO: 72.7 % (ref 42.7–76)
PLATELET # BLD AUTO: 182 10*3/MM3 (ref 140–500)
PMV BLD AUTO: 10 FL (ref 6–12)
POTASSIUM BLD-SCNC: 4 MMOL/L (ref 3.5–5.2)
RBC # BLD AUTO: 2.98 10*6/MM3 (ref 3.9–5.2)
SINUS: 2.9 CM
SODIUM BLD-SCNC: 130 MMOL/L (ref 136–145)
STJ: 2.5 CM
TIBC SERPL-MCNC: 197 MCG/DL (ref 298–536)
TRANSFERRIN SERPL-MCNC: 132 MG/DL (ref 200–360)
WBC NRBC COR # BLD: 10.19 10*3/MM3 (ref 4.5–10.7)

## 2018-11-06 PROCEDURE — 80048 BASIC METABOLIC PNL TOTAL CA: CPT | Performed by: INTERNAL MEDICINE

## 2018-11-06 PROCEDURE — 93306 TTE W/DOPPLER COMPLETE: CPT

## 2018-11-06 PROCEDURE — 63710000001 DIPHENHYDRAMINE PER 50 MG: Performed by: HOSPITALIST

## 2018-11-06 PROCEDURE — 25010000002 ENOXAPARIN PER 10 MG: Performed by: INTERNAL MEDICINE

## 2018-11-06 PROCEDURE — 94799 UNLISTED PULMONARY SVC/PX: CPT

## 2018-11-06 PROCEDURE — 82728 ASSAY OF FERRITIN: CPT | Performed by: INTERNAL MEDICINE

## 2018-11-06 PROCEDURE — 85025 COMPLETE CBC W/AUTO DIFF WBC: CPT | Performed by: INTERNAL MEDICINE

## 2018-11-06 PROCEDURE — 83540 ASSAY OF IRON: CPT | Performed by: INTERNAL MEDICINE

## 2018-11-06 PROCEDURE — 93306 TTE W/DOPPLER COMPLETE: CPT | Performed by: INTERNAL MEDICINE

## 2018-11-06 PROCEDURE — 25010000002 LEVOFLOXACIN PER 250 MG: Performed by: INTERNAL MEDICINE

## 2018-11-06 PROCEDURE — 84466 ASSAY OF TRANSFERRIN: CPT | Performed by: INTERNAL MEDICINE

## 2018-11-06 RX ORDER — BUDESONIDE 0.5 MG/2ML
0.5 INHALANT ORAL
Status: DISCONTINUED | OUTPATIENT
Start: 2018-11-06 | End: 2018-11-07

## 2018-11-06 RX ORDER — GUAIFENESIN 600 MG/1
1200 TABLET, EXTENDED RELEASE ORAL EVERY 12 HOURS SCHEDULED
Status: DISCONTINUED | OUTPATIENT
Start: 2018-11-06 | End: 2018-11-11 | Stop reason: HOSPADM

## 2018-11-06 RX ADMIN — IPRATROPIUM BROMIDE AND ALBUTEROL SULFATE 3 ML: 2.5; .5 SOLUTION RESPIRATORY (INHALATION) at 14:58

## 2018-11-06 RX ADMIN — SODIUM CHLORIDE 75 ML/HR: 9 INJECTION, SOLUTION INTRAVENOUS at 06:35

## 2018-11-06 RX ADMIN — Medication 3 ML: at 12:24

## 2018-11-06 RX ADMIN — BUDESONIDE 0.5 MG: 0.5 INHALANT RESPIRATORY (INHALATION) at 20:27

## 2018-11-06 RX ADMIN — HYDROCODONE POLISTIREX AND CHLORPHENIRAMINE POLISITREX 5 ML: 10; 8 SUSPENSION, EXTENDED RELEASE ORAL at 12:24

## 2018-11-06 RX ADMIN — GUAIFENESIN 600 MG: 600 TABLET, EXTENDED RELEASE ORAL at 12:21

## 2018-11-06 RX ADMIN — VITAMIN D, TAB 1000IU (100/BT) 1000 UNITS: 25 TAB at 12:21

## 2018-11-06 RX ADMIN — ACETAMINOPHEN 650 MG: 325 TABLET, FILM COATED ORAL at 20:09

## 2018-11-06 RX ADMIN — DIPHENHYDRAMINE HYDROCHLORIDE 25 MG: 25 CAPSULE ORAL at 20:09

## 2018-11-06 RX ADMIN — ASPIRIN 81 MG: 81 TABLET, DELAYED RELEASE ORAL at 12:21

## 2018-11-06 RX ADMIN — BENZONATATE 200 MG: 100 CAPSULE ORAL at 12:20

## 2018-11-06 RX ADMIN — IPRATROPIUM BROMIDE AND ALBUTEROL SULFATE 3 ML: 2.5; .5 SOLUTION RESPIRATORY (INHALATION) at 08:31

## 2018-11-06 RX ADMIN — Medication 3 ML: at 20:11

## 2018-11-06 RX ADMIN — LEVOFLOXACIN 500 MG: 5 INJECTION, SOLUTION INTRAVENOUS at 12:21

## 2018-11-06 RX ADMIN — ENOXAPARIN SODIUM 40 MG: 40 INJECTION SUBCUTANEOUS at 17:49

## 2018-11-06 RX ADMIN — IPRATROPIUM BROMIDE AND ALBUTEROL SULFATE 3 ML: 2.5; .5 SOLUTION RESPIRATORY (INHALATION) at 20:27

## 2018-11-06 RX ADMIN — Medication 1000 MCG: at 12:21

## 2018-11-06 RX ADMIN — GUAIFENESIN 1200 MG: 600 TABLET, EXTENDED RELEASE ORAL at 20:09

## 2018-11-06 NOTE — CONSULTS
CONSULT NOTE    Patient Identification:  Shaina Mcknight  89 y.o.  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  She was started on azithromycin secondary to shortness of breath.  She expresses a bothersome cough worsening congestion and fever documented to 101.4 prescription at home prior to arrival.    The patient is a retired nurse.  She was treated for tuberculosis in her lower 30s.(60 years ago, 15 months at [place])  She is a never smoker.  Otherwise she acknowledges some diarrhea weeks prior to her admission as well as some weight loss however this seems explain secondary to significant emotional and physical stress from taking care of her  who recently passed away.      History of asthma, never on any inhalers, no prior ae of asthma or hospitalization for such. No inhaler use at home.    Review of Systems:  CONSTITUTIONAL:  +fevers or chills  EYE:  No new vision changes  EAR:  No change in hearing  CARDIAC:  No chest pain  PULMONARY:  + non productive cough +shortness of breath  GI:  +diarrhea, hematemesis or hematochezia,  RENAL:  No dysuria or urinary frequency  MUSCULOSKELETAL:  No musculoskeletal complaints  ENDOCRINE:  No heat or cold intolerance  INTEGUMENTARY: No skin rashes  NEUROLOGICAL:  No dizziness or confusion.  No seizure activity  PSYCHIATRIC:  No new anxiety or depression  12 system review of systems performed and all else negative    Past Medical History:   Diagnosis Date   • Acute pain of left shoulder due to trauma    • Aortic valve regurgitation     trace   • Aortic valve stenosis     mild   • SPENCER (dyspnea on exertion)    • Fall     going up the steps    • Heart murmur    • Mitral valve regurgitation     mild to moderate   • Pulmonary valve regurgitation     trace   • Tricuspid valve regurgitation     mild to moderate       Past Surgical History:   Procedure Laterality Date   • CATARACT EXTRACTION Bilateral    • HYSTERECTOMY          Prescriptions Prior to Admission   Medication Sig Dispense Refill Last Dose   • aspirin 81 MG tablet Take 81 mg by mouth Daily.   Taking   • azithromycin (ZITHROMAX Z-ROSIE) 250 MG tablet Take 2 tablets at the same time Day 1 and then 1 tablet every 24 hour thereafter. 6 tablet 0    • benzonatate (TESSALON) 200 MG capsule Take 1 capsule by mouth 3 (Three) Times a Day As Needed for Cough for up to 10 days. 30 capsule 0    • Calcium Carbonate (CALCIUM 600) 1500 (600 Ca) MG tablet Take 600 mg by mouth Daily.   Taking   • IRON PO Take  by mouth.   Taking   • Misc Natural Products (OSTEO BI-FLEX ADV TRIPLE ST PO) Take 1 tablet by mouth Daily.   Taking   • MULTIPLE VITAMIN PO Take 1 tablet by mouth Daily.   Taking   • vitamin B-12 (CYANOCOBALAMIN) 1000 MCG tablet Take 1,000 mcg by mouth Daily.   Taking   • Vitamin D, Cholecalciferol, 1000 units capsule Take 1 capsule by mouth Daily.   Taking       Allergies   Allergen Reactions   • Molds & Smuts Shortness Of Breath   • Penicillins Hives   • Oyster Shell        Social History     Social History   • Marital status:      Spouse name: N/A   • Number of children: N/A   • Years of education: N/A     Occupational History   • Not on file.     Social History Main Topics   • Smoking status: Never Smoker   • Smokeless tobacco: Never Used      Comment: caffine use   • Alcohol use 0.6 oz/week     1 Glasses of wine per week      Comment: daily   • Drug use: No   • Sexual activity: Defer     Other Topics Concern   • Not on file     Social History Narrative   • No narrative on file       Family History   Problem Relation Age of Onset   • Stroke Mother    • Cancer Mother    • Heart attack Father    •  "Heart disease Father    • Sudden death Father    • Heart attack Brother    • Heart disease Brother    • Sudden death Brother    • Cancer Son        Physical Exam:  /78 (BP Location: Left arm, Patient Position: Lying)   Pulse 85   Temp 98.6 °F (37 °C) (Oral)   Resp 18   Ht 154.9 cm (61\")   Wt 63.9 kg (140 lb 14 oz)   SpO2 99%   BMI 26.62 kg/m²   Body mass index is 26.62 kg/m².   GENERAL:  NAD, Aox3  HEENT:  EOMI, nonicteric sclera, no JVD  PULMONARY:    Extensive exp wheeze coarse air entry, unlabored resp effort symmetric air entry  CARDIAC:  RRR +BELEM II/VI mid peak RUSB radiating   ABD: SNTND BS+  EXT: no c/c/e, pulses symmetric 2+ bilaterally  NEURO:  CNs II-XII intact, no focal deficits  SKIN: lesion on bridge of nose  PSYCH: appropriate mood  LYMPH: no cervical LAD    LABS:    Results from last 7 days  Lab Units 11/06/18  0548 11/05/18  0720   WBC 10*3/mm3 10.19 12.03*   HEMOGLOBIN g/dL 8.9* 9.7*   PLATELETS 10*3/mm3 182 187     Results from last 7 days  Lab Units 11/06/18  0548 11/05/18  0720   SODIUM mmol/L 130* 130*   POTASSIUM mmol/L 4.0 3.9   CHLORIDE mmol/L 98 94*   CO2 mmol/L 22.8 25.4   BUN mg/dL 11 15   CREATININE mg/dL 0.71 0.80   GLUCOSE mg/dL 103* 105*   CALCIUM mg/dL 8.0* 8.3   Estimated Creatinine Clearance: 39.2 mL/min (by C-G formula based on SCr of 0.71 mg/dL).    Imaging: I personally visualized the images of scans/x-rays performed within last 3 days.  Imaging Results (most recent)     Procedure Component Value Units Date/Time    XR Chest PA & Lateral [[id number]] Collected:  11/04/18 1703     Updated:  11/04/18 1708    Narrative:       XR CHEST PA AND LATERAL-     Clinical: Pneumonia     COMPARISON 11/10/2016 chest radiograph and CT 9/13/2017     FINDINGS: There is again demonstrated chronic pleural and parenchymal  change and old granulomatous disease, no acute consolidation has  developed. Patchy area of infiltrate is suggested in the retrocardiac  left lower lobe. No pulmonary " edema, heart size is normal. The  mediastinum is stable.     CONCLUSION: Patchy area of infiltrate is suggested in the retrocardiac  portion of the left lower lobe. Chest is otherwise similar to  11/10/2016.     This report was finalized on 11/4/2018 5:05 PM by Dr. Carlos Alberto Duong M.D.             Assessment / Recommendations:  RSV Viral Pneumonia  Acute Hypoxic Respiratory Failure  CAP  Remote History of TB 70 years ago s/p treatments  AV stenosis - Mild in Oct 2016  Asthma with AE    Repeat TTE to check AS  cxr in am  Cont bd therapy, ill add buesonide - I offered low dose systemic steroids to reduce bronchospasm but patient declines as she is afraid of her TB history 60 years ago.  Explained to pt that RSV in particular is notorious for bronchospasm especially in someone with a predisposition for such.      Very pleasant 89yo, thank you Dr Nova for asking me to see her.    Thanks for allowing us to participate in the care of this patient.  LPC is always available to discuss any concerns, questions or to help coordinate the patient's care.        Casey Veronica MD  [facility]  11/06/18  10:47 AM

## 2018-11-06 NOTE — PROGRESS NOTES
Name: Shaina Mcknight ADMIT: 2018   : 10/4/1927  PCP: Fanta Shankar MD    MRN: [id number] LOS: 2 days   AGE/SEX: 89 y.o. female  ROOM: [facility]   Subjective     Breathing worse, needing more O2 now   coughing up yellow sputum, needing nebulizers.  Denies chest pain or palpitations.  No nausea or vomiting.    Diarrhea resolved  Objective   Vital Signs  Temp:  [98.6 °F (37 °C)-99.5 °F (37.5 °C)] 98.6 °F (37 °C)  Heart Rate:  [85-92] 85  Resp:  [16-20] 18  BP: (112-151)/(56-78) 130/78  SpO2:  [93 %-99 %] 99 %  on  Flow (L/min):  [2-3.5] 3.5;   Device (Oxygen Therapy): nasal cannula  Body mass index is 26.62 kg/m².    Physical Exam   Constitutional: She is oriented to person, place, and time. She appears well-developed and well-nourished. No distress.   HENT:   Head: Normocephalic and atraumatic.   Neck: No JVD present.   Cardiovascular: Normal rate and regular rhythm.  Exam reveals no friction rub.    No murmur heard.  Pulmonary/Chest: No stridor. Tachypnea noted. She has no decreased breath sounds. She has wheezes (throughout). She has rales.   Abnormal lung sounds throughout.   Abdominal: Soft. Bowel sounds are normal. She exhibits no distension. There is no tenderness.   Musculoskeletal: She exhibits no edema or tenderness.   Neurological: She is alert and oriented to person, place, and time.   Skin: She is not diaphoretic. No pallor.   Psychiatric: She has a normal mood and affect. Her behavior is normal.   Vitals reviewed.      Results Review:       I reviewed the patient's new clinical results.    Results from last 7 days  Lab Units 18  0548 18  0720   WBC 10*3/mm3 10.19 12.03*   HEMOGLOBIN g/dL 8.9* 9.7*   PLATELETS 10*3/mm3 182 187       Results from last 7 days  Lab Units 18  0548 18  0720   SODIUM mmol/L 130* 130*   POTASSIUM mmol/L 4.0 3.9   CHLORIDE mmol/L 98 94*   CO2 mmol/L 22.8 25.4   BUN mg/dL 11 15   CREATININE mg/dL 0.71 0.80   GLUCOSE mg/dL 103* 105*    Estimated Creatinine Clearance: 39.2 mL/min (by C-G formula based on SCr of 0.71 mg/dL).      Results from last 7 days  Lab Units 11/06/18  0548 11/05/18  0720   CALCIUM mg/dL 8.0* 8.3           aspirin 81 mg Oral Daily   cholecalciferol 1,000 Units Oral Daily   enoxaparin 40 mg Subcutaneous Q24H   guaiFENesin 1,200 mg Oral Q12H   ipratropium-albuterol 3 mL Nebulization 4x Daily - RT   levoFLOXacin 500 mg Intravenous Q24H   sodium chloride 3 mL Intravenous Q12H   vitamin B-12 1,000 mcg Oral Daily       sodium chloride 75 mL/hr Last Rate: 75 mL/hr (11/06/18 0635)   Diet Regular      Assessment/Plan      Active Hospital Problems    Diagnosis Date Noted   • **Community acquired pneumonia of right lower lobe of lung (CMS/[facility]) [J18.1] 11/04/2018   • Leukocytosis [D72.829] 11/04/2018   • Acute respiratory failure with hypoxia (CMS/[facility]) [J96.01] 11/04/2018   • Bacterial pneumonia [J15.9] 11/04/2018   • Sepsis (CMS/[facility]) [A41.9] 11/04/2018   • Nonrheumatic aortic valve stenosis [I35.0] 09/01/2017      Resolved Hospital Problems    Diagnosis Date Noted Date Resolved   No resolved problems to display.     Ms. Mcknight is a 89 y.o. female  with a history of tuberculosis in her 20s (treated) who presented to outside hospital emergency room for pneumonia.     Community acquired pneumonia: She has had a viral bronchitis with RSV and now a secondary bacterial infection. Strep pneumo and legionella antigens are negative.  We'll continue with Levaquin for 7 days (D3). She does meet sepsis criteria with fever 101.4 at home and leukocytosis of 17,000.  -Continue when necessary albuterol and scheduled duo nebs  -due to worsening ask Pulmonology to see today, D/W Dr. Casey Veronica     The patient has remote history of TB 60 years ago.       Suspect hyponatremia is related to dehydration and from her pneumonia itself.  Cont IV fluids and recheck in the morning.     Anemia: check iron studies today, B12 and folate    Diarrhea: Ongoing for  about 1 month.  Suspect the worsening diarrhea overnight was from antibiotics.  C. difficile negative. Added imodium prn    The patient's daughter, Betzy, a healthcare surrogate and she wishes to be a full code.    DISPO: TBD    Norm Nova MD  [facility]  11/06/18  10:59 AM

## 2018-11-06 NOTE — PLAN OF CARE
Problem: Patient Care Overview  Goal: Plan of Care Review  Outcome: Ongoing (interventions implemented as appropriate)   11/06/18 0314   Coping/Psychosocial   Plan of Care Reviewed With patient   Plan of Care Review   Progress no change   OTHER   Outcome Summary VSS; pt still with productive cough; PRN cough med given; up with minimal assist; IV antibiotic continue; Will continue to monitor       Problem: Skin Injury Risk (Adult)  Goal: Skin Health and Integrity  Outcome: Ongoing (interventions implemented as appropriate)      Problem: Infection, Risk/Actual (Adult)  Goal: Infection Prevention/Resolution  Outcome: Ongoing (interventions implemented as appropriate)      Problem: Fall Risk (Adult)  Goal: Absence of Fall  Outcome: Ongoing (interventions implemented as appropriate)

## 2018-11-07 ENCOUNTER — APPOINTMENT (OUTPATIENT)
Dept: GENERAL RADIOLOGY | Facility: HOSPITAL | Age: 83
End: 2018-11-07

## 2018-11-07 LAB
ANION GAP SERPL CALCULATED.3IONS-SCNC: 12.1 MMOL/L
BACTERIA SPEC RESP CULT: NORMAL
BASOPHILS # BLD AUTO: 0.02 10*3/MM3 (ref 0–0.2)
BASOPHILS NFR BLD AUTO: 0.2 % (ref 0–1.5)
BUN BLD-MCNC: 6 MG/DL (ref 8–23)
BUN/CREAT SERPL: 8.7 (ref 7–25)
CALCIUM SPEC-SCNC: 8.3 MG/DL (ref 8.2–9.6)
CHLORIDE SERPL-SCNC: 96 MMOL/L (ref 98–107)
CO2 SERPL-SCNC: 23.9 MMOL/L (ref 22–29)
CREAT BLD-MCNC: 0.69 MG/DL (ref 0.57–1)
DEPRECATED RDW RBC AUTO: 46.8 FL (ref 37–54)
EOSINOPHIL # BLD AUTO: 0.11 10*3/MM3 (ref 0–0.7)
EOSINOPHIL NFR BLD AUTO: 0.9 % (ref 0.3–6.2)
ERYTHROCYTE [DISTWIDTH] IN BLOOD BY AUTOMATED COUNT: 13.4 % (ref 11.7–13)
FOLATE SERPL-MCNC: >20 NG/ML (ref 4.78–24.2)
GFR SERPL CREATININE-BSD FRML MDRD: 80 ML/MIN/1.73
GLUCOSE BLD-MCNC: 122 MG/DL (ref 65–99)
GRAM STN SPEC: NORMAL
HCT VFR BLD AUTO: 29.5 % (ref 35.6–45.5)
HGB BLD-MCNC: 9.5 G/DL (ref 11.9–15.5)
IMM GRANULOCYTES # BLD: 0.05 10*3/MM3 (ref 0–0.03)
IMM GRANULOCYTES NFR BLD: 0.4 % (ref 0–0.5)
LYMPHOCYTES # BLD AUTO: 1.61 10*3/MM3 (ref 0.9–4.8)
LYMPHOCYTES NFR BLD AUTO: 12.7 % (ref 19.6–45.3)
MCH RBC QN AUTO: 30.5 PG (ref 26.9–32)
MCHC RBC AUTO-ENTMCNC: 32.2 G/DL (ref 32.4–36.3)
MCV RBC AUTO: 94.9 FL (ref 80.5–98.2)
MONOCYTES # BLD AUTO: 0.96 10*3/MM3 (ref 0.2–1.2)
MONOCYTES NFR BLD AUTO: 7.6 % (ref 5–12)
NEUTROPHILS # BLD AUTO: 9.96 10*3/MM3 (ref 1.9–8.1)
NEUTROPHILS NFR BLD AUTO: 78.2 % (ref 42.7–76)
PLATELET # BLD AUTO: 232 10*3/MM3 (ref 140–500)
PMV BLD AUTO: 9.9 FL (ref 6–12)
POTASSIUM BLD-SCNC: 3.5 MMOL/L (ref 3.5–5.2)
RBC # BLD AUTO: 3.11 10*6/MM3 (ref 3.9–5.2)
SODIUM BLD-SCNC: 132 MMOL/L (ref 136–145)
VIT B12 BLD-MCNC: >2000 PG/ML (ref 211–946)
WBC NRBC COR # BLD: 12.71 10*3/MM3 (ref 4.5–10.7)

## 2018-11-07 PROCEDURE — 94799 UNLISTED PULMONARY SVC/PX: CPT

## 2018-11-07 PROCEDURE — 80048 BASIC METABOLIC PNL TOTAL CA: CPT | Performed by: INTERNAL MEDICINE

## 2018-11-07 PROCEDURE — 25010000002 ENOXAPARIN PER 10 MG: Performed by: INTERNAL MEDICINE

## 2018-11-07 PROCEDURE — 71045 X-RAY EXAM CHEST 1 VIEW: CPT

## 2018-11-07 PROCEDURE — 82607 VITAMIN B-12: CPT | Performed by: INTERNAL MEDICINE

## 2018-11-07 PROCEDURE — 85025 COMPLETE CBC W/AUTO DIFF WBC: CPT | Performed by: INTERNAL MEDICINE

## 2018-11-07 PROCEDURE — 25010000002 METHYLPREDNISOLONE PER 40 MG: Performed by: INTERNAL MEDICINE

## 2018-11-07 PROCEDURE — 63710000001 DIPHENHYDRAMINE PER 50 MG: Performed by: HOSPITALIST

## 2018-11-07 PROCEDURE — 82746 ASSAY OF FOLIC ACID SERUM: CPT | Performed by: INTERNAL MEDICINE

## 2018-11-07 PROCEDURE — 25010000002 FUROSEMIDE PER 20 MG: Performed by: INTERNAL MEDICINE

## 2018-11-07 PROCEDURE — 25010000002 LEVOFLOXACIN PER 250 MG: Performed by: INTERNAL MEDICINE

## 2018-11-07 RX ORDER — FUROSEMIDE 10 MG/ML
20 INJECTION INTRAMUSCULAR; INTRAVENOUS ONCE
Status: COMPLETED | OUTPATIENT
Start: 2018-11-07 | End: 2018-11-07

## 2018-11-07 RX ORDER — METHYLPREDNISOLONE SODIUM SUCCINATE 40 MG/ML
40 INJECTION, POWDER, LYOPHILIZED, FOR SOLUTION INTRAMUSCULAR; INTRAVENOUS EVERY 8 HOURS
Status: DISCONTINUED | OUTPATIENT
Start: 2018-11-07 | End: 2018-11-10

## 2018-11-07 RX ORDER — ARFORMOTEROL TARTRATE 15 UG/2ML
15 SOLUTION RESPIRATORY (INHALATION)
Status: DISCONTINUED | OUTPATIENT
Start: 2018-11-07 | End: 2018-11-11 | Stop reason: HOSPADM

## 2018-11-07 RX ADMIN — IPRATROPIUM BROMIDE AND ALBUTEROL SULFATE 3 ML: 2.5; .5 SOLUTION RESPIRATORY (INHALATION) at 06:35

## 2018-11-07 RX ADMIN — ASPIRIN 81 MG: 81 TABLET, DELAYED RELEASE ORAL at 10:13

## 2018-11-07 RX ADMIN — DIPHENHYDRAMINE HYDROCHLORIDE 25 MG: 25 CAPSULE ORAL at 20:52

## 2018-11-07 RX ADMIN — Medication 3 ML: at 10:13

## 2018-11-07 RX ADMIN — HYDROCODONE POLISTIREX AND CHLORPHENIRAMINE POLISITREX 5 ML: 10; 8 SUSPENSION, EXTENDED RELEASE ORAL at 11:56

## 2018-11-07 RX ADMIN — METHYLPREDNISOLONE SODIUM SUCCINATE 40 MG: 40 INJECTION, POWDER, FOR SOLUTION INTRAMUSCULAR; INTRAVENOUS at 20:52

## 2018-11-07 RX ADMIN — LEVOFLOXACIN 500 MG: 5 INJECTION, SOLUTION INTRAVENOUS at 11:56

## 2018-11-07 RX ADMIN — Medication 1000 MCG: at 10:13

## 2018-11-07 RX ADMIN — IPRATROPIUM BROMIDE AND ALBUTEROL SULFATE 3 ML: 2.5; .5 SOLUTION RESPIRATORY (INHALATION) at 20:19

## 2018-11-07 RX ADMIN — METHYLPREDNISOLONE SODIUM SUCCINATE 40 MG: 40 INJECTION, POWDER, FOR SOLUTION INTRAMUSCULAR; INTRAVENOUS at 13:13

## 2018-11-07 RX ADMIN — ALBUTEROL SULFATE 2.5 MG: 2.5 SOLUTION RESPIRATORY (INHALATION) at 03:57

## 2018-11-07 RX ADMIN — BUDESONIDE 0.5 MG: 0.5 INHALANT RESPIRATORY (INHALATION) at 06:35

## 2018-11-07 RX ADMIN — IPRATROPIUM BROMIDE AND ALBUTEROL SULFATE 3 ML: 2.5; .5 SOLUTION RESPIRATORY (INHALATION) at 15:45

## 2018-11-07 RX ADMIN — GUAIFENESIN 1200 MG: 600 TABLET, EXTENDED RELEASE ORAL at 10:13

## 2018-11-07 RX ADMIN — VITAMIN D, TAB 1000IU (100/BT) 1000 UNITS: 25 TAB at 10:13

## 2018-11-07 RX ADMIN — ENOXAPARIN SODIUM 40 MG: 40 INJECTION SUBCUTANEOUS at 18:00

## 2018-11-07 RX ADMIN — BENZONATATE 200 MG: 100 CAPSULE ORAL at 10:14

## 2018-11-07 RX ADMIN — GUAIFENESIN 1200 MG: 600 TABLET, EXTENDED RELEASE ORAL at 20:52

## 2018-11-07 RX ADMIN — FUROSEMIDE 20 MG: 10 INJECTION, SOLUTION INTRAMUSCULAR; INTRAVENOUS at 11:56

## 2018-11-07 RX ADMIN — SODIUM CHLORIDE 75 ML/HR: 9 INJECTION, SOLUTION INTRAVENOUS at 01:53

## 2018-11-07 RX ADMIN — Medication 3 ML: at 20:53

## 2018-11-07 RX ADMIN — IPRATROPIUM BROMIDE AND ALBUTEROL SULFATE 3 ML: 2.5; .5 SOLUTION RESPIRATORY (INHALATION) at 10:39

## 2018-11-07 NOTE — PLAN OF CARE
Problem: Patient Care Overview  Goal: Plan of Care Review  Outcome: Ongoing (interventions implemented as appropriate)   11/07/18 1335   Coping/Psychosocial   Plan of Care Reviewed With patient;daughter   OTHER   Outcome Summary a&o, denies pain except with cough. resp distress noted with exertion. o2 @ 3 L/nc, humidified. coarse rhonchi, wheezes. new orders noted, code status changed, will continue to monitor.       Problem: Skin Injury Risk (Adult)  Goal: Skin Health and Integrity  Outcome: Ongoing (interventions implemented as appropriate)   11/07/18 1335   Skin Injury Risk (Adult)   Skin Health and Integrity making progress toward outcome       Problem: Infection, Risk/Actual (Adult)  Goal: Infection Prevention/Resolution  Outcome: Ongoing (interventions implemented as appropriate)   11/07/18 1335   Infection, Risk/Actual (Adult)   Infection Prevention/Resolution making progress toward outcome       Problem: Fall Risk (Adult)  Goal: Absence of Fall  Outcome: Ongoing (interventions implemented as appropriate)   11/07/18 1335   Fall Risk (Adult)   Absence of Fall making progress toward outcome       Problem: Pneumonia (Adult)  Goal: Signs and Symptoms of Listed Potential Problems Will be Absent, Minimized or Managed (Pneumonia)  Outcome: Ongoing (interventions implemented as appropriate)   11/07/18 1335   Goal/Outcome Evaluation   Problems Assessed (Pneumonia) all   Problems Present (Pneumonia) fluid/electrolyte imbalance;infection progression;respiratory compromise

## 2018-11-07 NOTE — PLAN OF CARE
Problem: Patient Care Overview  Goal: Plan of Care Review  Outcome: Ongoing (interventions implemented as appropriate)   11/07/18 0303   Coping/Psychosocial   Plan of Care Reviewed With patient   Plan of Care Review   Progress no change   OTHER   Outcome Summary Pt alert and oriented x4, elevated HR, BP and temp at start of shift with increased work of breathing. Pt medicated with PRN Tylenol and scheduled mucinex, respiratory paged, scheduled breathing treatment given, incentive spirometer instruction given to patient. Temperature, HR and BP improved on follow-up, pt breathing easier throughout night. Up to BSC with staff assistance, SOB with exertion. Will continue to monitor.        Problem: Skin Injury Risk (Adult)  Goal: Skin Health and Integrity  Outcome: Ongoing (interventions implemented as appropriate)      Problem: Infection, Risk/Actual (Adult)  Goal: Infection Prevention/Resolution  Outcome: Ongoing (interventions implemented as appropriate)      Problem: Fall Risk (Adult)  Goal: Absence of Fall  Outcome: Ongoing (interventions implemented as appropriate)      Problem: Pneumonia (Adult)  Goal: Signs and Symptoms of Listed Potential Problems Will be Absent, Minimized or Managed (Pneumonia)  Outcome: Ongoing (interventions implemented as appropriate)

## 2018-11-07 NOTE — PROGRESS NOTES
"  Daily Progress Note.   [address]  11/7/2018    Patient:  Name:  Shaina Mcknight  MRN:  [id number]  10/4/1927  89 y.o.  female         Interval History:  Episode of worsening soa overnight.  Improved after breathing treatment  Mild increased resp effort at present but able to carry on conversation    ROS: +fever, no diarrhea, no chest pain  PMFSSH: no change    Physical Exam:  /62 (BP Location: Right arm, Patient Position: Lying)   Pulse 88   Temp 97.9 °F (36.6 °C) (Oral)   Resp 18   Ht 154.9 cm (61\")   Wt 63.5 kg (140 lb)   SpO2 100%   BMI 26.45 kg/m²   Body mass index is 26.45 kg/m².    Intake/Output Summary (Last 24 hours) at 11/07/18 0831  Last data filed at 11/07/18 0603   Gross per 24 hour   Intake             2073 ml   Output                0 ml   Net             2073 ml     GENERAL:  mild resp distress, Aox3  HEENT:  EOMI, nonicteric sclera, no JVD  PULMONARY:    Extensive exp wheeze coarse air entry, unlabored resp effort symmetric air entry mild increased resp effort  CARDIAC:  RRR +BELEM II/VI mid peak RUSB radiating   ABD: SNTND BS+  EXT: no c/c/e, pulses symmetric 2+ bilaterally  NEURO:  CNs II-XII intact, no focal deficits  SKIN: lesion on bridge of nose  PSYCH: appropriate mood  LYMPH: no cervical LAD    Data Review:  Notable Labs:    Results from last 7 days  Lab Units 11/07/18  0531 11/06/18  0548 11/05/18  0720   WBC 10*3/mm3 12.71* 10.19 12.03*   HEMOGLOBIN g/dL 9.5* 8.9* 9.7*   PLATELETS 10*3/mm3 232 182 187     Results from last 7 days  Lab Units 11/07/18  0531 11/06/18  0548 11/05/18  0720   SODIUM mmol/L 132* 130* 130*   POTASSIUM mmol/L 3.5 4.0 3.9   CHLORIDE mmol/L 96* 98 94*   CO2 mmol/L 23.9 22.8 25.4   BUN mg/dL 6* 11 15   CREATININE mg/dL 0.69 0.71 0.80   GLUCOSE mg/dL 122* 103* 105*   CALCIUM mg/dL 8.3 8.0* 8.3   Estimated Creatinine Clearance: 39.1 mL/min (by C-G formula based on SCr of 0.69 mg/dL).    Imaging:  cxr shows worsening of RLL " infiltrate as well as diffuse bilateral infiltrates    Scheduled meds:      aspirin 81 mg Oral Daily   budesonide 0.5 mg Nebulization BID - RT   cholecalciferol 1,000 Units Oral Daily   enoxaparin 40 mg Subcutaneous Q24H   guaiFENesin 1,200 mg Oral Q12H   ipratropium-albuterol 3 mL Nebulization 4x Daily - RT   levoFLOXacin 500 mg Intravenous Q24H   sodium chloride 3 mL Intravenous Q12H   vitamin B-12 1,000 mcg Oral Daily       ASSESSMENT  /  PLAN:  Assessment / Recommendations:  RSV Viral Pneumonia  Acute Hypoxic Respiratory Failure  CAP  Remote History of TB 60 years ago s/p treatments  AV stenosis - Mild in Oct 2016  Asthma with AE     Cont abx  Cont bd  Add brovana  Add iv steroids - discussed with patient and daughters  OPEP  Discussed with patient and family including daughter who is HPOA patients wishes for code status.  She apparently has a son living at her house that she wants to ensure in her will gets to live at the house and is taken care of, and once this is changed in her will may alter some of her decisions but at present, she wishes to pursue intubation and mech ventilation but would wish no code measures in event of a cardiovascular arrest.  This was discussed with her HPOA present who was in agreement and appreciated the conversation with her mother.  Stop IVfs, diuresis    Tenuous status, hopefully we can see some improvement    D/w Dr Nova today.    Casey Veronica MD  [facility]  11/07/18

## 2018-11-07 NOTE — PROGRESS NOTES
Name: Shaina Mcknight ADMIT: 2018   : 10/4/1927  PCP: Fanta Shankar MD    MRN: [id number] LOS: 3 days   AGE/SEX: 89 y.o. female  ROOM: [facility]   Subjective    Febrile overnight   Not eating much, no moving much, DTRs state abd distended but passing flatus, no BM x 2 days, no pain, nausea or vomiting  Breathing worse, still on 3.5 L satting 93%  CXR is worsening   coughing up yellow sputum, needing nebulizers.    Denies chest pain or palpitations.     Objective   Vital Signs  Temp:  [97.2 °F (36.2 °C)-101.5 °F (38.6 °C)] 97.2 °F (36.2 °C)  Heart Rate:  [] 88  Resp:  [18-20] 18  BP: (120-186)/(58-77) 145/62  SpO2:  [92 %-100 %] 100 %  on  Flow (L/min):  [2-3.5] 3.5;   Device (Oxygen Therapy): humidified;nasal cannula  Body mass index is 26.45 kg/m².    Physical Exam   Constitutional: She is oriented to person, place, and time. She appears well-developed and well-nourished. No distress.   HENT:   Head: Normocephalic and atraumatic.   Neck: No JVD present.   Cardiovascular: Normal rate and regular rhythm.  Exam reveals no friction rub.    No murmur heard.  Pulmonary/Chest: No stridor. Tachypnea noted. She has no decreased breath sounds. She has wheezes (throughout). She has rales (R>L, throughout).   Abnormal lung sounds throughout.   Abdominal: Soft. Bowel sounds are normal. She exhibits no distension (tympanic but good BS). There is no tenderness. There is no guarding.   Musculoskeletal: She exhibits no edema or tenderness.   Neurological: She is alert and oriented to person, place, and time.   Skin: Skin is warm and dry. She is not diaphoretic. No erythema. No pallor.   Psychiatric: She has a normal mood and affect. Her behavior is normal.   Vitals reviewed.      Results Review:       I reviewed the patient's new clinical results.    Results from last 7 days  Lab Units 18  0531 18  0548 18  0720   WBC 10*3/mm3 12.71* 10.19 12.03*   HEMOGLOBIN g/dL 9.5* 8.9* 9.7*   PLATELETS  10*3/mm3 232 182 187       Results from last 7 days  Lab Units 11/07/18  0531 11/06/18  0548 11/05/18  0720   SODIUM mmol/L 132* 130* 130*   POTASSIUM mmol/L 3.5 4.0 3.9   CHLORIDE mmol/L 96* 98 94*   CO2 mmol/L 23.9 22.8 25.4   BUN mg/dL 6* 11 15   CREATININE mg/dL 0.69 0.71 0.80   GLUCOSE mg/dL 122* 103* 105*   Estimated Creatinine Clearance: 39.1 mL/min (by C-G formula based on SCr of 0.69 mg/dL).      Results from last 7 days  Lab Units 11/07/18  0531 11/06/18  0548 11/05/18  0720   CALCIUM mg/dL 8.3 8.0* 8.3           aspirin 81 mg Oral Daily   budesonide 0.5 mg Nebulization BID - RT   cholecalciferol 1,000 Units Oral Daily   enoxaparin 40 mg Subcutaneous Q24H   furosemide 20 mg Intravenous Once   guaiFENesin 1,200 mg Oral Q12H   ipratropium-albuterol 3 mL Nebulization 4x Daily - RT   levoFLOXacin 500 mg Intravenous Q24H   sodium chloride 3 mL Intravenous Q12H   vitamin B-12 1,000 mcg Oral Daily      Diet Regular      Assessment/Plan      Active Hospital Problems    Diagnosis Date Noted   • **Community acquired pneumonia of right lower lobe of lung (CMS/HCC) [J18.1] 11/04/2018   • Leukocytosis [D72.829] 11/04/2018   • Acute respiratory failure with hypoxia (CMS/HCC) [J96.01] 11/04/2018   • Bacterial pneumonia [J15.9] 11/04/2018   • Sepsis (CMS/[facility]) [A41.9] 11/04/2018   • Nonrheumatic aortic valve stenosis [I35.0] 09/01/2017      Resolved Hospital Problems    Diagnosis Date Noted Date Resolved   No resolved problems to display.     Ms. Mcknight is a 89 y.o. female  with a history of tuberculosis in her 20s (treated) who presented to outside hospital emergency room for pneumonia.     Community acquired pneumonia: She has had a viral bronchitis with RSV and now a secondary bacterial infection. Strep pneumo and legionella antigens are negative.  We'll continue with Levaquin for 7 days (D3). She does meet sepsis criteria with fever 101.4 at home and leukocytosis of 17,000.  -Continue when necessary albuterol and  scheduled duo nebs  -CXR reviewed  -stop IV fluids and give lasix 20mg IV  -pt has agreed to steroids and we will start today  -Appreciate Pulmonology, D/W Dr. Casey Veronica     The patient has remote history of TB 60 years ago and was concerned for reactivation with steroids       Suspect hyponatremia is related to dehydration and from her pneumonia itself. It's improved today     Anemia: DEMIAN, will start PO replacement soon but hold off given abd distention     Abd distention; suspect ileus from her acute illness, good bowel sounds and is asymptomatic.  We'll follow closely and may need to check and x-ray if she becomes symptomatic     Diarrhea: Ongoing for about 1 month.  Suspect the worsening diarrhea overnight was from antibiotics.  C. difficile negative. Added imodium prn    The patient's daughter, Betzy, a healthcare surrogate and she wishes to be a full code.    Once she starts to improve we will need to ask physical therapy to see her as I suspect she'll need subacute rehabilitation     DISPO: TBD    Norm Nova MD  [facility]  11/07/18  10:11 AM

## 2018-11-08 LAB
ANION GAP SERPL CALCULATED.3IONS-SCNC: 10.6 MMOL/L
BASOPHILS # BLD AUTO: 0.01 10*3/MM3 (ref 0–0.2)
BASOPHILS NFR BLD AUTO: 0.1 % (ref 0–1.5)
BUN BLD-MCNC: 11 MG/DL (ref 8–23)
BUN/CREAT SERPL: 19 (ref 7–25)
CALCIUM SPEC-SCNC: 8.7 MG/DL (ref 8.2–9.6)
CHLORIDE SERPL-SCNC: 95 MMOL/L (ref 98–107)
CO2 SERPL-SCNC: 27.4 MMOL/L (ref 22–29)
CREAT BLD-MCNC: 0.58 MG/DL (ref 0.57–1)
DEPRECATED RDW RBC AUTO: 45.3 FL (ref 37–54)
EOSINOPHIL # BLD AUTO: 0.01 10*3/MM3 (ref 0–0.7)
EOSINOPHIL NFR BLD AUTO: 0.1 % (ref 0.3–6.2)
ERYTHROCYTE [DISTWIDTH] IN BLOOD BY AUTOMATED COUNT: 13.3 % (ref 11.7–13)
GFR SERPL CREATININE-BSD FRML MDRD: 97 ML/MIN/1.73
GLUCOSE BLD-MCNC: 154 MG/DL (ref 65–99)
HCT VFR BLD AUTO: 30.4 % (ref 35.6–45.5)
HGB BLD-MCNC: 9.8 G/DL (ref 11.9–15.5)
IMM GRANULOCYTES # BLD: 0.08 10*3/MM3 (ref 0–0.03)
IMM GRANULOCYTES NFR BLD: 0.7 % (ref 0–0.5)
LYMPHOCYTES # BLD AUTO: 0.89 10*3/MM3 (ref 0.9–4.8)
LYMPHOCYTES NFR BLD AUTO: 7.6 % (ref 19.6–45.3)
MCH RBC QN AUTO: 30.1 PG (ref 26.9–32)
MCHC RBC AUTO-ENTMCNC: 32.2 G/DL (ref 32.4–36.3)
MCV RBC AUTO: 93.3 FL (ref 80.5–98.2)
MONOCYTES # BLD AUTO: 0.4 10*3/MM3 (ref 0.2–1.2)
MONOCYTES NFR BLD AUTO: 3.4 % (ref 5–12)
NEUTROPHILS # BLD AUTO: 10.34 10*3/MM3 (ref 1.9–8.1)
NEUTROPHILS NFR BLD AUTO: 88.1 % (ref 42.7–76)
PLATELET # BLD AUTO: 282 10*3/MM3 (ref 140–500)
PMV BLD AUTO: 9.2 FL (ref 6–12)
POTASSIUM BLD-SCNC: 4 MMOL/L (ref 3.5–5.2)
RBC # BLD AUTO: 3.26 10*6/MM3 (ref 3.9–5.2)
SODIUM BLD-SCNC: 133 MMOL/L (ref 136–145)
WBC NRBC COR # BLD: 11.73 10*3/MM3 (ref 4.5–10.7)

## 2018-11-08 PROCEDURE — 80048 BASIC METABOLIC PNL TOTAL CA: CPT | Performed by: INTERNAL MEDICINE

## 2018-11-08 PROCEDURE — 94799 UNLISTED PULMONARY SVC/PX: CPT

## 2018-11-08 PROCEDURE — 85025 COMPLETE CBC W/AUTO DIFF WBC: CPT | Performed by: INTERNAL MEDICINE

## 2018-11-08 PROCEDURE — 97162 PT EVAL MOD COMPLEX 30 MIN: CPT

## 2018-11-08 PROCEDURE — 63710000001 DIPHENHYDRAMINE PER 50 MG: Performed by: HOSPITALIST

## 2018-11-08 PROCEDURE — 25010000002 METHYLPREDNISOLONE PER 40 MG: Performed by: INTERNAL MEDICINE

## 2018-11-08 PROCEDURE — 97110 THERAPEUTIC EXERCISES: CPT

## 2018-11-08 PROCEDURE — 25010000002 ENOXAPARIN PER 10 MG: Performed by: INTERNAL MEDICINE

## 2018-11-08 PROCEDURE — 25010000002 LEVOFLOXACIN PER 250 MG: Performed by: INTERNAL MEDICINE

## 2018-11-08 RX ADMIN — Medication 3 ML: at 20:19

## 2018-11-08 RX ADMIN — METHYLPREDNISOLONE SODIUM SUCCINATE 40 MG: 40 INJECTION, POWDER, FOR SOLUTION INTRAMUSCULAR; INTRAVENOUS at 04:28

## 2018-11-08 RX ADMIN — METHYLPREDNISOLONE SODIUM SUCCINATE 40 MG: 40 INJECTION, POWDER, FOR SOLUTION INTRAMUSCULAR; INTRAVENOUS at 12:03

## 2018-11-08 RX ADMIN — Medication 1000 MCG: at 09:06

## 2018-11-08 RX ADMIN — ENOXAPARIN SODIUM 40 MG: 40 INJECTION SUBCUTANEOUS at 18:13

## 2018-11-08 RX ADMIN — POLYETHYLENE GLYCOL 3350 17 G: 17 POWDER, FOR SOLUTION ORAL at 18:13

## 2018-11-08 RX ADMIN — GUAIFENESIN 1200 MG: 600 TABLET, EXTENDED RELEASE ORAL at 09:06

## 2018-11-08 RX ADMIN — Medication 3 ML: at 09:06

## 2018-11-08 RX ADMIN — ASPIRIN 81 MG: 81 TABLET, DELAYED RELEASE ORAL at 09:06

## 2018-11-08 RX ADMIN — VITAMIN D, TAB 1000IU (100/BT) 1000 UNITS: 25 TAB at 09:06

## 2018-11-08 RX ADMIN — DIPHENHYDRAMINE HYDROCHLORIDE 25 MG: 25 CAPSULE ORAL at 22:35

## 2018-11-08 RX ADMIN — LEVOFLOXACIN 500 MG: 5 INJECTION, SOLUTION INTRAVENOUS at 12:04

## 2018-11-08 RX ADMIN — IPRATROPIUM BROMIDE AND ALBUTEROL SULFATE 3 ML: 2.5; .5 SOLUTION RESPIRATORY (INHALATION) at 12:17

## 2018-11-08 RX ADMIN — METHYLPREDNISOLONE SODIUM SUCCINATE 40 MG: 40 INJECTION, POWDER, FOR SOLUTION INTRAMUSCULAR; INTRAVENOUS at 20:19

## 2018-11-08 RX ADMIN — ARFORMOTEROL TARTRATE 15 MCG: 15 SOLUTION RESPIRATORY (INHALATION) at 20:07

## 2018-11-08 RX ADMIN — GUAIFENESIN 1200 MG: 600 TABLET, EXTENDED RELEASE ORAL at 20:19

## 2018-11-08 RX ADMIN — ARFORMOTEROL TARTRATE 15 MCG: 15 SOLUTION RESPIRATORY (INHALATION) at 08:21

## 2018-11-08 NOTE — PLAN OF CARE
Problem: Patient Care Overview  Goal: Plan of Care Review  Outcome: Ongoing (interventions implemented as appropriate)   11/08/18 3056   Coping/Psychosocial   Plan of Care Reviewed With patient   Plan of Care Review   Progress improving   OTHER   Outcome Summary A&Ox4, denies pain, short of air when talking or any activity, lungs diminished, nonproductive cough, tolerating steroids well, IS up to 750, will continue to monitor.       Problem: Fall Risk (Adult)  Goal: Absence of Fall  Outcome: Ongoing (interventions implemented as appropriate)      Problem: Pneumonia (Adult)  Goal: Signs and Symptoms of Listed Potential Problems Will be Absent, Minimized or Managed (Pneumonia)  Outcome: Ongoing (interventions implemented as appropriate)

## 2018-11-08 NOTE — PROGRESS NOTES
Name: Shaina Mcknight ADMIT: 2018   : 10/4/1927  PCP: Fanta Shankar MD    MRN: [id number] LOS: 4 days   AGE/SEX: 89 y.o. female  ROOM: [facility]   Subjective      Doing much better.  States breathing and cough is better with steroids.  Still little appetite but is not bothersome.  Abdomen soft mildly distended but not symptomatic.  No nausea or vomiting or abdominal pain.  Small bowel movement overnight.   still on 3.5 L satting 93%    Denies chest pain or palpitations.     Objective   Vital Signs  Temp:  [97 °F (36.1 °C)-98.6 °F (37 °C)] 97.6 °F (36.4 °C)  Heart Rate:  [83-93] 91  Resp:  [16] 16  BP: (119-168)/(64-78) 119/66  SpO2:  [93 %-97 %] 94 %  on  Flow (L/min):  [3.5] 3.5;   Device (Oxygen Therapy): nasal cannula  Body mass index is 24.83 kg/m².    Physical Exam   Constitutional: She is oriented to person, place, and time. She appears well-developed and well-nourished. No distress.   HENT:   Head: Normocephalic and atraumatic.   Neck: No JVD present.   Cardiovascular: Normal rate and regular rhythm.  Exam reveals no friction rub.    No murmur heard.  Pulmonary/Chest: No stridor. Tachypnea noted. She has no decreased breath sounds. She has wheezes (Much improved). She has rales (Right is greater than left but both improved).   Abnormal lung sounds throughout.   Abdominal: Soft. Bowel sounds are normal. She exhibits no distension (tympanic but good BS). There is no tenderness. There is no guarding.   Musculoskeletal: She exhibits no edema or tenderness.   Neurological: She is alert and oriented to person, place, and time.   Skin: Skin is warm and dry. She is not diaphoretic. No erythema. No pallor.   Psychiatric: She has a normal mood and affect. Her behavior is normal.   Vitals reviewed.      Results Review:       I reviewed the patient's new clinical results.    Results from last 7 days  Lab Units 18  0525 18  0531 18  0548 18  0720   WBC 10*3/mm3 11.73* 12.71* 10.19  12.03*   HEMOGLOBIN g/dL 9.8* 9.5* 8.9* 9.7*   PLATELETS 10*3/mm3 282 232 182 187       Results from last 7 days  Lab Units 11/08/18  0525 11/07/18  0531 11/06/18  0548 11/05/18  0720   SODIUM mmol/L 133* 132* 130* 130*   POTASSIUM mmol/L 4.0 3.5 4.0 3.9   CHLORIDE mmol/L 95* 96* 98 94*   CO2 mmol/L 27.4 23.9 22.8 25.4   BUN mg/dL 11 6* 11 15   CREATININE mg/dL 0.58 0.69 0.71 0.80   GLUCOSE mg/dL 154* 122* 103* 105*   Estimated Creatinine Clearance: 38 mL/min (by C-G formula based on SCr of 0.58 mg/dL).      Results from last 7 days  Lab Units 11/08/18 0525 11/07/18 0531 11/06/18  0548 11/05/18  0720   CALCIUM mg/dL 8.7 8.3 8.0* 8.3           arformoterol 15 mcg Nebulization BID - RT   aspirin 81 mg Oral Daily   cholecalciferol 1,000 Units Oral Daily   enoxaparin 40 mg Subcutaneous Q24H   guaiFENesin 1,200 mg Oral Q12H   ipratropium-albuterol 3 mL Nebulization 4x Daily - RT   levoFLOXacin 500 mg Intravenous Q24H   methylPREDNISolone sodium succinate 40 mg Intravenous Q8H   polyethylene glycol 17 g Oral Daily   sodium chloride 3 mL Intravenous Q12H   vitamin B-12 1,000 mcg Oral Daily      Diet Regular      Assessment/Plan      Active Hospital Problems    Diagnosis Date Noted   • **Community acquired pneumonia of right lower lobe of lung (CMS/HCC) [J18.1] 11/04/2018   • Leukocytosis [D72.829] 11/04/2018   • Acute respiratory failure with hypoxia (CMS/HCC) [J96.01] 11/04/2018   • Bacterial pneumonia [J15.9] 11/04/2018   • Sepsis (CMS/[facility]) [A41.9] 11/04/2018   • Nonrheumatic aortic valve stenosis [I35.0] 09/01/2017      Resolved Hospital Problems    Diagnosis Date Noted Date Resolved   No resolved problems to display.     Ms. Mcknight is a 89 y.o. female  with a history of tuberculosis in her 20s (treated) who presented to outside hospital emergency room for pneumonia.     Community acquired pneumonia: She has had a viral bronchitis with RSV and now a secondary bacterial infection. Strep pneumo and legionella antigens  are negative.  We'll continue with Levaquin for 7 days (D5). She does meet sepsis criteria with fever 101.4 at home and leukocytosis of 17,000.  -Continue when necessary albuterol and scheduled duo nebs  -CXR reviewed  -Improvement with IV steroids   -Appreciate Pulmonology, D/W Dr. Casey Veronica     The patient has remote history of TB 60 years ago and was concerned for reactivation with steroids       Suspect hyponatremia is related to dehydration and from her pneumonia itself.  Improved slowly daily     Anemia: DEMIAN, will start PO replacement soon but hold off given abd distention     Abd distention; suspect ileus from her acute illness, good bowel sounds and is asymptomatic.  We'll follow closely and may need to check and x-ray if she becomes symptomatic     Diarrhea: Ongoing for about 1 month.  Suspect the worsening diarrhea overnight was from antibiotics.  C. difficile negative. Added imodium prn    The patient's daughter, Betzy, a healthcare surrogate and she wishes to be a full code.    ask physical therapy to see her as I suspect she'll need subacute rehabilitation       DISPO: likely rehabilitation.  Probably ready on Saturday    Norm Nova MD  [facility]  11/08/18  4:18 PM

## 2018-11-08 NOTE — PLAN OF CARE
Problem: Patient Care Overview  Goal: Plan of Care Review  Outcome: Ongoing (interventions implemented as appropriate)   11/08/18 0903   Coping/Psychosocial   Plan of Care Reviewed With patient;daughter   Plan of Care Review   Progress no change   OTHER   Outcome Summary a&o, denies pain, no distress noted, vss, o2 @ 3.5l/nc, no resp distress noted at this time. meds administered, orders noted, will continue to monitor.       Problem: Skin Injury Risk (Adult)  Goal: Skin Health and Integrity  Outcome: Ongoing (interventions implemented as appropriate)   11/08/18 0903   Skin Injury Risk (Adult)   Skin Health and Integrity making progress toward outcome       Problem: Infection, Risk/Actual (Adult)  Goal: Infection Prevention/Resolution  Outcome: Ongoing (interventions implemented as appropriate)   11/08/18 0903   Infection, Risk/Actual (Adult)   Infection Prevention/Resolution making progress toward outcome       Problem: Fall Risk (Adult)  Goal: Absence of Fall  Outcome: Ongoing (interventions implemented as appropriate)   11/08/18 0903   Fall Risk (Adult)   Absence of Fall making progress toward outcome       Problem: Pneumonia (Adult)  Goal: Signs and Symptoms of Listed Potential Problems Will be Absent, Minimized or Managed (Pneumonia)  Outcome: Ongoing (interventions implemented as appropriate)   11/08/18 0903   Goal/Outcome Evaluation   Problems Assessed (Pneumonia) all   Problems Present (Pneumonia) fluid/electrolyte imbalance;infection progression;respiratory compromise

## 2018-11-08 NOTE — PLAN OF CARE
Problem: Patient Care Overview  Goal: Plan of Care Review   11/08/18 1600   OTHER   Outcome Summary Pt admitted 11/4 w/ pneumonia. Pt reports independence prior to admission, no AD, no falls. Today pt able to ambuate 150' w/ contact assist using rolling walker, demonstrates generalized weakness, impiared standing balance and gait pattern from baseline. Educated on activity/walking recommendations and PT POC. Anticipate DC to home w/ assist as needed.

## 2018-11-08 NOTE — PROGRESS NOTES
"  Daily Progress Note.   [address]  11/8/2018    Patient:  Name:  Shaina Mcknight  MRN:  [id number]  10/4/1927  89 y.o.  female         Interval History:  No fevers overnight  Doing better  Feels stronger  Less cough but still present  Felt hungry today      ROS: no fever, no diarrhea, no chest pain  PMFSSH: no change    Physical Exam:  /78 (BP Location: Right arm, Patient Position: Lying)   Pulse 84   Temp 97.2 °F (36.2 °C) (Oral)   Resp 16   Ht 154.9 cm (61\")   Wt 59.6 kg (131 lb 6.3 oz)   SpO2 96%   BMI 24.83 kg/m²   Body mass index is 24.83 kg/m².    Intake/Output Summary (Last 24 hours) at 11/08/18 1124  Last data filed at 11/08/18 0900   Gross per 24 hour   Intake              180 ml   Output                0 ml   Net              180 ml     GENERAL:  no resp distress, Aox3  HEENT:  EOMI, nonicteric sclera, no JVD  PULMONARY:    very faint exp wheeze however still coarse air entry, unlabored resp effort symmetric air entry   CARDIAC:  RRR +BELEM   ABD: SNTND BS+  EXT: no c/c/e, pulses symmetric 2+ bilaterally  NEURO:  CNs II-XII intact, no focal deficits  SKIN: lesion on bridge of nose  PSYCH: appropriate mood  LYMPH: no cervical LAD    Data Review:  Notable Labs:    Results from last 7 days  Lab Units 11/08/18  0525 11/07/18  0531 11/06/18  0548 11/05/18  0720   WBC 10*3/mm3 11.73* 12.71* 10.19 12.03*   HEMOGLOBIN g/dL 9.8* 9.5* 8.9* 9.7*   PLATELETS 10*3/mm3 282 232 182 187       Results from last 7 days  Lab Units 11/08/18  0525 11/07/18  0531 11/06/18  0548 11/05/18  0720   SODIUM mmol/L 133* 132* 130* 130*   POTASSIUM mmol/L 4.0 3.5 4.0 3.9   CHLORIDE mmol/L 95* 96* 98 94*   CO2 mmol/L 27.4 23.9 22.8 25.4   BUN mg/dL 11 6* 11 15   CREATININE mg/dL 0.58 0.69 0.71 0.80   GLUCOSE mg/dL 154* 122* 103* 105*   CALCIUM mg/dL 8.7 8.3 8.0* 8.3   Estimated Creatinine Clearance: 38 mL/min (by C-G formula based on SCr of 0.58 mg/dL).    Imaging:  cxr 11/7/2018 shows worsening of " "RLL infiltrate as well as diffuse bilateral infiltrates    Scheduled meds:      arformoterol 15 mcg Nebulization BID - RT   aspirin 81 mg Oral Daily   cholecalciferol 1,000 Units Oral Daily   enoxaparin 40 mg Subcutaneous Q24H   guaiFENesin 1,200 mg Oral Q12H   ipratropium-albuterol 3 mL Nebulization 4x Daily - RT   levoFLOXacin 500 mg Intravenous Q24H   methylPREDNISolone sodium succinate 40 mg Intravenous Q8H   sodium chloride 3 mL Intravenous Q12H   vitamin B-12 1,000 mcg Oral Daily       ASSESSMENT  /  PLAN:  RSV Viral Pneumonia  Acute Hypoxic Respiratory Failure  CAP  Remote History of TB 60 years ago s/p treatments  AV stenosis - Mild in Oct 2016  Asthma with AE     Cont abx  Cont bd  Add brovana  Cont iv steroids - think this has helped with her improvement  OPEP        From prior discussion:  \"Discussed with patient and family including daughter who is HPOA patients wishes for code status.  She apparently has a son living at her house that she wants to ensure in her will gets to live at the house and is taken care of, and once this is changed in her will may alter some of her decisions but at present, she wishes to pursue intubation and mech ventilation but would wish no code measures in event of a cardiovascular arrest.  This was discussed with her HPOA present who was in agreement and appreciated the conversation with her mother.\"    Casey Veronica MD  [facility]  11/08/18          "

## 2018-11-08 NOTE — THERAPY EVALUATION
Acute Care - Physical Therapy Initial Evaluation  [facility]     Patient Name: Shaina Mcknight  : 10/4/1927  MRN: [id number]  Today's Date: 2018   Onset of Illness/Injury or Date of Surgery: 18            Admit Date: 2018    Visit Dx: No diagnosis found.  Patient Active Problem List   Diagnosis   • TB (tuberculosis)   • Dyspnea on exertion   • Nonrheumatic aortic valve stenosis   • Community acquired pneumonia of right lower lobe of lung (CMS/HCC)   • Leukocytosis   • Acute respiratory failure with hypoxia (CMS/HCC)   • Bacterial pneumonia   • Sepsis (CMS/HCC)     Past Medical History:   Diagnosis Date   • Acute pain of left shoulder due to trauma    • Aortic valve regurgitation     trace   • Aortic valve stenosis     mild   • SPENCER (dyspnea on exertion)    • Fall     going up the steps   • Heart murmur    • Mitral valve regurgitation     mild to moderate   • Pulmonary valve regurgitation     trace   • Tricuspid valve regurgitation     mild to moderate     Past Surgical History:   Procedure Laterality Date   • CATARACT EXTRACTION Bilateral    • HYSTERECTOMY          PT ASSESSMENT (last 12 hours)      Physical Therapy Evaluation     Row Name 18 1553          PT Evaluation Time/Intention    Subjective Information complains of;weakness  -AR     Document Type evaluation  -AR     Mode of Treatment physical therapy  -AR     Patient Effort good  -AR     Symptoms Noted During/After Treatment fatigue  -AR     Row Name 18 155          General Information    Patient Profile Reviewed? yes  -AR     Onset of Illness/Injury or Date of Surgery 18  -AR     Equipment Currently Used at Home none  -AR     Pertinent History of Current Functional Problem [id number]  -AR     Existing Precautions/Restrictions fall;oxygen therapy device and L/min  -AR     Barriers to Rehab none identified  -AR     Row Name 18 155          Relationship/Environment    Lives With child(mae), adult  -AR     Row Name  11/08/18 1553          Resource/Environmental Concerns    Current Living Arrangements home/apartment/condo  -AR     Row Name 11/08/18 1553          Home Main Entrance    Number of Stairs, Main Entrance three  -AR     Stair Railings, Main Entrance railings safe and in good condition  -AR     Row Name 11/08/18 1553          Cognitive Assessment/Intervention- PT/OT    Orientation Status (Cognition) oriented x 4  -AR     Follows Commands (Cognition) WNL  -AR     Row Name 11/08/18 1553          Bed Mobility Assessment/Treatment    Bed Mobility Assessment/Treatment supine-sit;sit-supine  -AR     Supine-Sit Davis (Bed Mobility) supervision  -AR     Sit-Supine Davis (Bed Mobility) not tested  -AR     Assistive Device (Bed Mobility) bed rails;head of bed elevated  -AR     Row Name 11/08/18 1553          Transfer Assessment/Treatment    Transfer Assessment/Treatment sit-stand transfer;stand-sit transfer  -AR     Sit-Stand Davis (Transfers) minimum assist (75% patient effort)  -AR     Stand-Sit Davis (Transfers) contact guard  -AR     Row Name 11/08/18 1553          Sit-Stand Transfer    Assistive Device (Sit-Stand Transfers) walker, front-wheeled  -AR     Row Name 11/08/18 1553          Stand-Sit Transfer    Assistive Device (Stand-Sit Transfers) walker, front-wheeled  -AR     Row Name 11/08/18 1553          Gait/Stairs Assessment/Training    Davis Level (Gait) contact guard  -AR     Assistive Device (Gait) walker, front-wheeled  -AR     Distance in Feet (Gait) 150  -AR     Pattern (Gait) swing-through  -AR     Deviations/Abnormal Patterns (Gait) festinating/shuffling;gait speed decreased  -AR     Bilateral Gait Deviations forward flexed posture  -AR     Row Name 11/08/18 1553          General ROM    GENERAL ROM COMMENTS B LE WFL  -AR     Row Name 11/08/18 1553          MMT (Manual Muscle Testing)    General MMT Comments B LE 4/5  -AR     Row Name 11/08/18 1553          Pain Assessment     Additional Documentation Pain Scale: Numbers Pre/Post-Treatment (Group)  -AR     Row Name 11/08/18 9113          Pain Scale: Numbers Pre/Post-Treatment    Pain Scale: Numbers, Pretreatment 0/10 - no pain  -AR     Pain Scale: Numbers, Post-Treatment 0/10 - no pain  -AR     Pain Intervention(s) Repositioned  -AR     Row Name 11/08/18 8331          Physical Therapy Clinical Impression    Patient/Family Goals Statement (PT Clinical Impression) DC home  -AR     Criteria for Skilled Interventions Met (PT Clinical Impression) yes  -AR     Pathology/Pathophysiology Noted (Describe Specifically for Each System) musculoskeletal  -AR     Impairments Found (describe specific impairments) aerobic capacity/endurance;gait, locomotion, and balance  -AR     Rehab Potential (PT Clinical Summary) good, to achieve stated therapy goals  -AR     Row Name 11/08/18 1553          Vital Signs    Pre SpO2 (%) 95  -AR     O2 Delivery Pre Treatment supplemental O2  -AR     Post SpO2 (%) 93  -AR     O2 Delivery Post Treatment supplemental O2  -AR     Row Name 11/08/18 3393          Physical Therapy Goals    Bed Mobility Goal Selection (PT) bed mobility, PT goal 1  -AR     Transfer Goal Selection (PT) transfer, PT goal 1  -AR     Gait Training Goal Selection (PT) gait training, PT goal 1  -AR     Row Name 11/08/18 3993          Bed Mobility Goal 1 (PT)    Activity/Assistive Device (Bed Mobility Goal 1, PT) sit to supine;supine to sit  -AR     Kinde Level/Cues Needed (Bed Mobility Goal 1, PT) supervision required  -AR     Time Frame (Bed Mobility Goal 1, PT) 1 week  -AR     Row Name 11/08/18 9223          Transfer Goal 1 (PT)    Activity/Assistive Device (Transfer Goal 1, PT) sit-to-stand/stand-to-sit   least restrictive AD  -AR     Kinde Level/Cues Needed (Transfer Goal 1, PT) standby assist  -AR     Time Frame (Transfer Goal 1, PT) 1 week  -AR     Row Name 11/08/18 4923          Gait Training Goal 1 (PT)    Activity/Assistive  Device (Gait Training Goal 1, PT) walker, rolling   or least restrictive AD  -AR     [place] Level (Gait Training Goal 1, PT) standby assist  -AR     Distance (Gait Goal 1, PT) 300  -AR     Time Frame (Gait Training Goal 1, PT) 1 week  -AR     Row Name 11/08/18 1553          Positioning and Restraints    Pre-Treatment Position in bed   no alarm upon arrival  -AR     Post Treatment Position chair  -AR     In Chair sitting;call light within reach;encouraged to call for assist;with family/caregiver  -AR     Row Name 11/08/18 1553          Living Environment    Home Accessibility stairs to enter home  -AR       User Key  (r) = Recorded By, (t) = Taken By, (c) = Cosigned By    Initials Name Provider Type    AR Jeanna Bui PT Physical Therapist          Physical Therapy Education     Title: PT OT SLP Therapies (Done)     Topic: Physical Therapy (Done)     Point: Mobility training (Done)    Learning Progress Summary     Learner Status Readiness Method Response Comment Documented by    Patient Done Acceptance E [facility] 11/08/18 1559          Point: Home exercise program (Done)    Learning Progress Summary     Learner Status Readiness Method Response Comment Documented by    Patient Done Acceptance E [facility] 11/08/18 1559          Point: Body mechanics (Done)    Learning Progress Summary     Learner Status Readiness Method Response Comment Documented by    Patient Done Acceptance E [facility] 11/08/18 1559          Point: Precautions (Done)    Learning Progress Summary     Learner Status Readiness Method Response Comment Documented by    Patient Done Acceptance E [facility] 11/08/18 1559                      User Key     Initials Effective Dates Name Provider Type [facility] 04/03/18 -  Jeanna Bui, PT Physical Therapist PT                PT Recommendation and Plan  Anticipated Discharge Disposition (PT): home with assist, home with home health  Planned Therapy Interventions (PT Eval): balance training, bed  mobility training, gait training, home exercise program, patient/family education, ROM (range of motion), stair training, strengthening  Therapy Frequency (PT Clinical Impression): daily  Outcome Summary/Treatment Plan (PT)  Anticipated Equipment Needs at Discharge (PT):  (TBD)  Anticipated Discharge Disposition (PT): home with assist, home with home health  Outcome Summary: Pt admitted 11/4 w/ pneumonia.  Pt reports independence prior to admission, no AD, no falls.  Today pt able to ambuate 150' w/ contact assist using rolling walker, demonstrates generalized weakness, impiared standing balance and gait pattern from baseline.  Educated on activity/walking recommendations and PT POC.  Anticipate DC to home w/ assist as needed.           Time Calculation:         PT Charges     Row Name 11/08/18 1603             Time Calculation    Start Time 1533  -AR      Stop Time 1558  -AR      Time Calculation (min) 25 min  -AR      PT Received On 11/08/18  -AR      PT - Next Appointment 11/09/18  -AR      PT Goal Re-Cert Due Date 11/15/18  -AR        User Key  (r) = Recorded By, (t) = Taken By, (c) = Cosigned By    Initials Name Provider Type    AR Jeanna Bui, PT Physical Therapist        Therapy Suggested Charges     Code   Minutes Charges    None           Therapy Charges for Today     Code Description Service Date Service Provider Modifiers Qty    73295645764 HC PT EVAL MOD COMPLEXITY 2 11/8/2018 Jeanna Bui, PT GP 1    29765190715 HC PT THER PROC EA 15 MIN 11/8/2018 Jeanna Bui PT GP 1                 Jeanna Bui PT  11/8/2018

## 2018-11-09 LAB
ANION GAP SERPL CALCULATED.3IONS-SCNC: 12.8 MMOL/L
BASOPHILS # BLD AUTO: 0.02 10*3/MM3 (ref 0–0.2)
BASOPHILS NFR BLD AUTO: 0.1 % (ref 0–1.5)
BUN BLD-MCNC: 18 MG/DL (ref 8–23)
BUN/CREAT SERPL: 19.8 (ref 7–25)
CALCIUM SPEC-SCNC: 8.7 MG/DL (ref 8.2–9.6)
CHLORIDE SERPL-SCNC: 95 MMOL/L (ref 98–107)
CO2 SERPL-SCNC: 25.2 MMOL/L (ref 22–29)
CREAT BLD-MCNC: 0.91 MG/DL (ref 0.57–1)
DEPRECATED RDW RBC AUTO: 45.7 FL (ref 37–54)
EOSINOPHIL # BLD AUTO: 0 10*3/MM3 (ref 0–0.7)
EOSINOPHIL NFR BLD AUTO: 0 % (ref 0.3–6.2)
ERYTHROCYTE [DISTWIDTH] IN BLOOD BY AUTOMATED COUNT: 13.3 % (ref 11.7–13)
GFR SERPL CREATININE-BSD FRML MDRD: 58 ML/MIN/1.73
GLUCOSE BLD-MCNC: 168 MG/DL (ref 65–99)
HCT VFR BLD AUTO: 29.3 % (ref 35.6–45.5)
HGB BLD-MCNC: 9.5 G/DL (ref 11.9–15.5)
IMM GRANULOCYTES # BLD: 0.33 10*3/MM3 (ref 0–0.03)
IMM GRANULOCYTES NFR BLD: 1.7 % (ref 0–0.5)
LYMPHOCYTES # BLD AUTO: 1.28 10*3/MM3 (ref 0.9–4.8)
LYMPHOCYTES NFR BLD AUTO: 6.6 % (ref 19.6–45.3)
MCH RBC QN AUTO: 30.2 PG (ref 26.9–32)
MCHC RBC AUTO-ENTMCNC: 32.4 G/DL (ref 32.4–36.3)
MCV RBC AUTO: 93 FL (ref 80.5–98.2)
MONOCYTES # BLD AUTO: 1.09 10*3/MM3 (ref 0.2–1.2)
MONOCYTES NFR BLD AUTO: 5.6 % (ref 5–12)
NEUTROPHILS # BLD AUTO: 16.63 10*3/MM3 (ref 1.9–8.1)
NEUTROPHILS NFR BLD AUTO: 86 % (ref 42.7–76)
PLATELET # BLD AUTO: 312 10*3/MM3 (ref 140–500)
PMV BLD AUTO: 9.5 FL (ref 6–12)
POTASSIUM BLD-SCNC: 3.8 MMOL/L (ref 3.5–5.2)
RBC # BLD AUTO: 3.15 10*6/MM3 (ref 3.9–5.2)
SODIUM BLD-SCNC: 133 MMOL/L (ref 136–145)
WBC NRBC COR # BLD: 19.35 10*3/MM3 (ref 4.5–10.7)

## 2018-11-09 PROCEDURE — 63710000001 DIPHENHYDRAMINE PER 50 MG: Performed by: HOSPITALIST

## 2018-11-09 PROCEDURE — 25010000002 METHYLPREDNISOLONE PER 40 MG: Performed by: INTERNAL MEDICINE

## 2018-11-09 PROCEDURE — 25010000002 ENOXAPARIN PER 10 MG: Performed by: INTERNAL MEDICINE

## 2018-11-09 PROCEDURE — 97110 THERAPEUTIC EXERCISES: CPT

## 2018-11-09 PROCEDURE — 85025 COMPLETE CBC W/AUTO DIFF WBC: CPT | Performed by: INTERNAL MEDICINE

## 2018-11-09 PROCEDURE — 94799 UNLISTED PULMONARY SVC/PX: CPT

## 2018-11-09 PROCEDURE — 25010000002 LEVOFLOXACIN PER 250 MG: Performed by: INTERNAL MEDICINE

## 2018-11-09 PROCEDURE — 80048 BASIC METABOLIC PNL TOTAL CA: CPT | Performed by: INTERNAL MEDICINE

## 2018-11-09 RX ORDER — FERROUS SULFATE 325(65) MG
325 TABLET ORAL
Status: DISCONTINUED | OUTPATIENT
Start: 2018-11-10 | End: 2018-11-11 | Stop reason: HOSPADM

## 2018-11-09 RX ADMIN — ENOXAPARIN SODIUM 40 MG: 40 INJECTION SUBCUTANEOUS at 17:12

## 2018-11-09 RX ADMIN — LEVOFLOXACIN 500 MG: 5 INJECTION, SOLUTION INTRAVENOUS at 12:43

## 2018-11-09 RX ADMIN — HYDROCODONE BITARTRATE AND HOMATROPINE METHYLBROMIDE 5 ML: 5; 1.5 SOLUTION ORAL at 20:37

## 2018-11-09 RX ADMIN — Medication 3 ML: at 20:37

## 2018-11-09 RX ADMIN — ASPIRIN 81 MG: 81 TABLET, DELAYED RELEASE ORAL at 09:30

## 2018-11-09 RX ADMIN — ARFORMOTEROL TARTRATE 15 MCG: 15 SOLUTION RESPIRATORY (INHALATION) at 21:18

## 2018-11-09 RX ADMIN — VITAMIN D, TAB 1000IU (100/BT) 1000 UNITS: 25 TAB at 09:30

## 2018-11-09 RX ADMIN — IPRATROPIUM BROMIDE AND ALBUTEROL SULFATE 3 ML: 2.5; .5 SOLUTION RESPIRATORY (INHALATION) at 15:18

## 2018-11-09 RX ADMIN — Medication 1000 MCG: at 09:30

## 2018-11-09 RX ADMIN — DIPHENHYDRAMINE HYDROCHLORIDE 25 MG: 25 CAPSULE ORAL at 20:40

## 2018-11-09 RX ADMIN — ARFORMOTEROL TARTRATE 15 MCG: 15 SOLUTION RESPIRATORY (INHALATION) at 07:18

## 2018-11-09 RX ADMIN — METHYLPREDNISOLONE SODIUM SUCCINATE 40 MG: 40 INJECTION, POWDER, FOR SOLUTION INTRAMUSCULAR; INTRAVENOUS at 12:43

## 2018-11-09 RX ADMIN — GUAIFENESIN 1200 MG: 600 TABLET, EXTENDED RELEASE ORAL at 09:30

## 2018-11-09 RX ADMIN — METHYLPREDNISOLONE SODIUM SUCCINATE 40 MG: 40 INJECTION, POWDER, FOR SOLUTION INTRAMUSCULAR; INTRAVENOUS at 04:55

## 2018-11-09 RX ADMIN — METHYLPREDNISOLONE SODIUM SUCCINATE 40 MG: 40 INJECTION, POWDER, FOR SOLUTION INTRAMUSCULAR; INTRAVENOUS at 20:37

## 2018-11-09 RX ADMIN — Medication 3 ML: at 09:31

## 2018-11-09 RX ADMIN — POLYETHYLENE GLYCOL 3350 17 G: 17 POWDER, FOR SOLUTION ORAL at 09:30

## 2018-11-09 RX ADMIN — ALBUTEROL SULFATE 2.5 MG: 2.5 SOLUTION RESPIRATORY (INHALATION) at 01:26

## 2018-11-09 RX ADMIN — BENZONATATE 200 MG: 100 CAPSULE ORAL at 00:15

## 2018-11-09 RX ADMIN — IPRATROPIUM BROMIDE AND ALBUTEROL SULFATE 3 ML: 2.5; .5 SOLUTION RESPIRATORY (INHALATION) at 10:53

## 2018-11-09 RX ADMIN — GUAIFENESIN 1200 MG: 600 TABLET, EXTENDED RELEASE ORAL at 20:37

## 2018-11-09 NOTE — PLAN OF CARE
Problem: Patient Care Overview  Goal: Plan of Care Review  Outcome: Ongoing (interventions implemented as appropriate)   11/09/18 0518   Coping/Psychosocial   Plan of Care Reviewed With patient;daughter   Plan of Care Review   Progress no change   OTHER   Outcome Summary Patients vitals stable. Patient denies pain and discomfort this shift. Patient reports some coughing and PRN cough medication and breathing treatment given. Patient tolerating steroids without difficulty. Patients daughter remains at bedside. Will continue to monitor.       Problem: Infection, Risk/Actual (Adult)  Goal: Infection Prevention/Resolution  Outcome: Ongoing (interventions implemented as appropriate)      Problem: Fall Risk (Adult)  Goal: Absence of Fall  Outcome: Ongoing (interventions implemented as appropriate)      Problem: Pneumonia (Adult)  Goal: Signs and Symptoms of Listed Potential Problems Will be Absent, Minimized or Managed (Pneumonia)  Outcome: Ongoing (interventions implemented as appropriate)

## 2018-11-09 NOTE — PROGRESS NOTES
"  Daily Progress Note.   [address]  11/9/2018    Patient:  Name:  Shaina Mcknight  MRN:  [id number]  10/4/1927  89 y.o.  female         Interval History:  No fevers overnight but sig cough after what sounds like she had a choking episode of tessalon pearles.  Breathing overall better  Sig cough.    ROS: no fever, no diarrhea, no chest pain  PMFSSH: no change    Physical Exam:  /54 (BP Location: Right arm, Patient Position: Lying)   Pulse 88   Temp 98.4 °F (36.9 °C) (Oral)   Resp 20   Ht 154.9 cm (61\")   Wt 61.5 kg (135 lb 9.3 oz)   SpO2 95%   BMI 25.62 kg/m²   Body mass index is 25.62 kg/m².    Intake/Output Summary (Last 24 hours) at 11/09/18 1345  Last data filed at 11/09/18 0900   Gross per 24 hour   Intake              360 ml   Output                6 ml   Net              354 ml     GENERAL:  no resp distress, Aox3  HEENT:  EOMI, nonicteric sclera, no JVD  PULMONARY:    +exp wheeze however still coarse air entry, unlabored resp effort symmetric air entry   CARDIAC:  RRR +BELEM   ABD: SNTND BS+  EXT: no c/c/e, pulses symmetric 2+ bilaterally  NEURO:  CNs II-XII intact, no focal deficits  SKIN: lesion on bridge of nose  PSYCH: appropriate mood  LYMPH: no cervical LAD    Data Review:  Notable Labs:    Results from last 7 days  Lab Units 11/09/18  0525 11/08/18  0525 11/07/18  0531 11/06/18  0548 11/05/18  0720   WBC 10*3/mm3 19.35* 11.73* 12.71* 10.19 12.03*   HEMOGLOBIN g/dL 9.5* 9.8* 9.5* 8.9* 9.7*   PLATELETS 10*3/mm3 312 282 232 182 187       Results from last 7 days  Lab Units 11/09/18  0525 11/08/18  0525 11/07/18  0531 11/06/18  0548 11/05/18  0720   SODIUM mmol/L 133* 133* 132* 130* 130*   POTASSIUM mmol/L 3.8 4.0 3.5 4.0 3.9   CHLORIDE mmol/L 95* 95* 96* 98 94*   CO2 mmol/L 25.2 27.4 23.9 22.8 25.4   BUN mg/dL 18 11 6* 11 15   CREATININE mg/dL 0.91 0.58 0.69 0.71 0.80   GLUCOSE mg/dL 168* 154* 122* 103* 105*   CALCIUM mg/dL 8.7 8.7 8.3 8.0* 8.3   Estimated Creatinine " "Clearance: 33.9 mL/min (by C-G formula based on SCr of 0.91 mg/dL).    Imaging:  cxr 11/7/2018 shows worsening of RLL infiltrate as well as diffuse bilateral infiltrates    Scheduled meds:      arformoterol 15 mcg Nebulization BID - RT   aspirin 81 mg Oral Daily   cholecalciferol 1,000 Units Oral Daily   enoxaparin 40 mg Subcutaneous Q24H   guaiFENesin 1,200 mg Oral Q12H   ipratropium-albuterol 3 mL Nebulization 4x Daily - RT   levoFLOXacin 500 mg Intravenous Q24H   methylPREDNISolone sodium succinate 40 mg Intravenous Q8H   polyethylene glycol 17 g Oral Daily   sodium chloride 3 mL Intravenous Q12H   vitamin B-12 1,000 mcg Oral Daily       ASSESSMENT  /  PLAN:  RSV Viral Pneumonia  Acute Hypoxic Respiratory Failure  CAP - bacterial  Remote History of TB 60 years ago s/p treatments  AV stenosis - Mild in Oct 2016  Asthma with AE     Wbc up after steroids  Looks and feels better  Stop tessalon pearles  Steroids to po  Cont bds  Updated family at bedside.            From prior discussion:  \"Discussed with patient and family including daughter who is HPOA patients wishes for code status.  She apparently has a son living at her house that she wants to ensure in her will gets to live at the house and is taken care of, and once this is changed in her will may alter some of her decisions but at present, she wishes to pursue intubation and mech ventilation but would wish no code measures in event of a cardiovascular arrest.  This was discussed with her HPOA present who was in agreement and appreciated the conversation with her mother.\"    Casey Veronica MD  [facility]  11/09/18          "

## 2018-11-09 NOTE — PROGRESS NOTES
Continued Stay Note  [facility]     Patient Name: Shaina Mcknight  MRN: [id number]  Today's Date: 11/9/2018    Admit Date: 11/4/2018          Discharge Plan     Row Name 11/09/18 1053       Plan    Plan SNF - referrals pending    Patient/Family in Agreement with Plan yes    Plan Comments Followed up with family and they would like referrals to #1 [facility] and [facility] #2 [facility] and #3 [facility]. Called referrals to [facility] and [facility] and left vmms for all. Packet started and in the [facility] office.    11/9/18 1145 - Per [facility] has accepted and they will have a bed tomorrow. Family updated.     11/9/18 1225 - Inbound call from [facility] and patient is approved. Vmm from [facility] and they also approved. Both are aware that [facility] is first choice and patient going there.     11/9/18 1500 - Packet placed on chart in case of weekend DC.               Discharge Codes    No documentation.       Expected Discharge Date and Time     Expected Discharge Date Expected Discharge Time    Nov 9, 2018             Anamaria Wagner, RN

## 2018-11-09 NOTE — DISCHARGE PLACEMENT REQUEST
"Shaina Redd (89 y.o. Female)     Date of Birth Social Security Number Address Home Phone MRN    10/04/1927  [address] [address] [phone] [id number]    [employer] Marital Status          Evangel[employer]        Admission Date Admission Type Admitting Provider Attending Provider Department, Room/Bed    11/4/18 Urgent Norm Nova MD [place], David Ryan, MD [address], [address]    Discharge Date Discharge Disposition Discharge Destination                       Attending Provider:  Norm Nova MD    Allergies:  Molds & Smuts, Penicillins, Oyster Shell    Isolation:  Contact   Infection:  RSV (11/04/18)   Code Status:  No CPR    Ht:  154.9 cm (61\")   Wt:  61.5 kg (135 lb 9.3 oz)    Admission Cmt:  MEDICARE, AARP   Principal Problem:  Community acquired pneumonia of right lower lobe of lung (CMS/HCC) [J18.1]                 Active Insurance as of 11/4/2018     Primary Coverage     Payor Plan Insurance Group Employer/Plan Group    MEDICARE MEDICARE A & B      Payor Plan Address Payor Plan Phone Number Effective From Effective To    [address] [phone] 10/1/1992     [facility] [id number]       Subscriber Name Subscriber Birth Date Member ID       SHAINA REDD 10/4/1927 [id number]           Secondary Coverage     Payor Plan Insurance Group Employer/Plan Group    AARP MED SUPP AARP HEALTH CARE OPTIONS      Payor Plan Address Payor Plan Phone Number Effective From Effective To    [facility] [phone] 1/1/2016     [address]       [facility] [id number]       Subscriber Name Subscriber Birth Date Member ID       SHAINA REDD 10/4/1927 [id number]                 Emergency Contacts      (Rel.) Home Phone Work Phone Mobile Phone    Taylor Carpenter (Power of ) [phone] -- --              "

## 2018-11-09 NOTE — THERAPY TREATMENT NOTE
"Acute Care - Physical Therapy Treatment Note  [facility]     Patient Name: Shaina Mcknight  : 10/4/1927  MRN: [id number]  Today's Date: 2018  Onset of Illness/Injury or Date of Surgery: 18          Admit Date: 2018    Visit Dx:  No diagnosis found.  Patient Active Problem List   Diagnosis   • TB (tuberculosis)   • Dyspnea on exertion   • Nonrheumatic aortic valve stenosis   • Community acquired pneumonia of right lower lobe of lung (CMS/HCC)   • Leukocytosis   • Acute respiratory failure with hypoxia (CMS/HCC)   • Bacterial pneumonia   • Sepsis (CMS/HCC)       Therapy Treatment          Rehabilitation Treatment Summary     Row Name 18             Treatment Time/Intention    Discipline physical therapist  -SV      Document Type therapy note (daily note)  -SV      Subjective Information complains of;fatigue   coughing spell all  night\"  -SV      Mode of Treatment individual therapy  -SV      Patient/Family Observations daughters present  -SV      Patient Effort good  -SV      Comment soa while amb on 4 L   -SV      Treatment Considerations/Comments PLOF Indep amb no O2 or AD  -SV      Recorded by [SV] Angela Diggs, PT 18 1023      Row Name 18             Vital Signs    Pre SpO2 (%) 95  -SV      O2 Delivery Pre Treatment supplemental O2  -SV      O2 Delivery Intra Treatment supplemental O2  -SV      Post SpO2 (%) 94  -SV      O2 Delivery Post Treatment supplemental O2  -SV      Pre Patient Position Supine  -SV      Intra Patient Position Standing  -SV      Post Patient Position Sitting  -SV      Recorded by [SV] Angela Diggs, PT 18 1023      Row Name 18             Cognitive Assessment/Intervention- PT/OT    Orientation Status (Cognition) oriented x 4  -SV      Follows Commands (Cognition) WFL  -SV      Recorded by [SV] Angela Diggs, PT 18 1023      Row Name 18             Bed Mobility Assessment/Treatment    Supine-Sit " Atkinson (Bed Mobility) supervision  -SV      Sit-Supine [place] (Bed Mobility) not tested  -SV      Recorded by [SV] Angela Diggs, PT 11/09/18 1023      Row Name 11/09/18 0900             Sit-Stand Transfer    Sit-Stand [place]kinson (Transfers) contact guard;verbal cues  -SV      Assistive Device (Sit-Stand Transfers) walker, front-wheeled  -SV      Recorded by [SV] Angela Diggs, PT 11/09/18 1023      Row Name 11/09/18 0900             Stand-Sit Transfer    Stand-Sit [place]kinson (Transfers) contact guard;verbal cues  -SV      Assistive Device (Stand-Sit Transfers) walker, front-wheeled  -SV      Recorded by [SV] Angela Diggs, PT 11/09/18 1023      Row Name 11/09/18 0900             Gait/Stairs Assessment/Training    Atkinson Level (Gait) contact guard  -SV      Assistive Device (Gait) walker, front-wheeled  -SV      Distance in Feet (Gait) 150   x 3 with standing rest periods due to soa  -SV      Deviations/Abnormal Patterns (Gait) festinating/shuffling      -SV      Recorded by [SV] Angela Diggs, PT 11/09/18 1023      Row Name 11/09/18 0900             Motor Skills Assessment/Interventions    Additional Documentation --   Instructed in gentle posterior leg stretch x2 20 cnt hold(  -SV      Recorded by [SV] Angela Diggs, PT 11/09/18 1023      Row Name 11/09/18 0900             Positioning and Restraints    Pre-Treatment Position in bed  -SV      Post Treatment Position chair  -SV      In Chair sitting;notified nsg;call light within reach;encouraged to call for assist;with family/caregiver  -SV      Recorded by [SV] Angela Diggs, PT 11/09/18 1023      Row Name 11/09/18 0900             Pain Assessment    Additional Documentation --   denies pain today  -SV      Recorded by [SV] Angela Diggs, PT 11/09/18 1023      Row Name 11/09/18 0900             Health Promotion    Additional Documentation --   instructed in PLB for improved endurance  -SV      Recorded by [SV] Dontae  Angela HOPE, PT 11/09/18 1023        User Key  (r) = Recorded By, (t) = Taken By, (c) = Cosigned By    Initials Name Effective Dates Discipline    SV Angela Diggs, PT 04/03/18 -  PT                     Physical Therapy Education     Title: PT OT SLP Therapies (Done)     Topic: Physical Therapy (Done)     Point: Mobility training (Done)    Learning Progress Summary     Learner Status Readiness Method Response Comment Documented by    Patient Done Acceptance E,TB,D VU,NR   11/09/18 1024     Done Acceptance E VU  AR 11/08/18 1559    Family Done Acceptance E,TB,D VU,NR   11/09/18 1024          Point: Home exercise program (Done)    Learning Progress Summary     Learner Status Readiness Method Response Comment Documented by    Patient Done Acceptance E,TB,D VU,NR   11/09/18 1024     Done Acceptance E VU  AR 11/08/18 1559    Family Done Acceptance E,TB,D VU,NR  SV 11/09/18 1024          Point: Body mechanics (Done)    Learning Progress Summary     Learner Status Readiness Method Response Comment Documented by    Patient Done Acceptance E,TB,D VU,NR  SV 11/09/18 1024     Done Acceptance E VU  AR 11/08/18 1559    Family Done Acceptance E,TB,D VU,NR  SV 11/09/18 1024          Point: Precautions (Done)    Learning Progress Summary     Learner Status Readiness Method Response Comment Documented by    Patient Done Acceptance E,TB,D VU,NR   11/09/18 1024     Done Acceptance E VU  AR 11/08/18 1559    Family Done Acceptance E,TB,D VU,NR   11/09/18 1024                      User Key     Initials Effective Dates Name Provider Type Discipline    AR 04/03/18 -  Jeanna Bui, PT Physical Therapist PT     04/03/18 -  Angela Diggs, PT Physical Therapist PT                    PT Recommendation and Plan     Plan of Care Reviewed With: patient, daughter  Progress: improving  Outcome Summary: Pt tolerated increased activity today. Pt with noted DF less than neutral with arom. Pt instructed in gentle/as tolerated seated  posterior leg stretch. Pt educated on 2 reps only  with 15-20 count hold. Pt former downhill  and  . PLOF is indep community ambulation without O2 or AD. Pt is highly motivated to return to PLOF. Pt with obvious fatigue during ambulation. Pt  requires cues to slow down, rest due to shakiness following amb.  O2 sat on 4 L 94% today following amb.           Outcome Measures     Row Name 11/09/18 1000             How much help from another person do you currently need...    Turning from your back to your side while in flat bed without using bedrails? 4  -SV      Moving from lying on back to sitting on the side of a flat bed without bedrails? 4  -SV      Moving to and from a bed to a chair (including a wheelchair)? 3  -SV      Standing up from a chair using your arms (e.g., wheelchair, bedside chair)? 3  -SV      Climbing 3-5 steps with a railing? 3  -SV      To walk in hospital room? 3  -SV      AM-PAC 6 Clicks Score 20  -SV         Functional Assessment    Outcome Measure Options AM-PAC 6 Clicks Basic Mobility (PT)  -SV        User Key  (r) = Recorded By, (t) = Taken By, (c) = Cosigned By    Initials Name Provider Type    SV Angela Diggs, PT Physical Therapist           Time Calculation:         PT Charges     Row Name 11/09/18 1029             Time Calculation    Start Time 0900  -SV      Stop Time 0928  -SV      Time Calculation (min) 28 min  -SV      PT Received On 11/09/18  -SV      PT - Next Appointment 11/10/18  -SV        User Key  (r) = Recorded By, (t) = Taken By, (c) = Cosigned By    Initials Name Provider Type     Angela Diggs, PT Physical Therapist        Therapy Suggested Charges     Code   Minutes Charges    None           Therapy Charges for Today     Code Description Service Date Service Provider Modifiers Qty    92522849820 HC PT THER PROC EA 15 MIN 11/9/2018 Angela Diggs, PT GP 2          PT G-Codes  Outcome Measure Options: AM-PAC 6 Clicks Basic Mobility  (PT)  AM-PAC 6 Clicks Score: 20    Angela Diggs, PT  11/9/2018

## 2018-11-09 NOTE — PROGRESS NOTES
Name: Shaina Mcknight ADMIT: 2018   : 10/4/1927  PCP: Fanta Shankar MD    MRN: [id number] LOS: 5 days   AGE/SEX: 89 y.o. female  ROOM: [facility]   Subjective    Pretty severe coughing episode after taking tessalon last night  Doing much better now but is tired.  States breathing and cough is better with steroids.  Had large BM, No nausea or vomiting or abdominal pain.     Denies chest pain or palpitations.     Objective   Vital Signs  Temp:  [97 °F (36.1 °C)-98.4 °F (36.9 °C)] 97.3 °F (36.3 °C)  Heart Rate:  [] 88  Resp:  [16-20] 20  BP: (107-134)/(54-68) 129/61  SpO2:  [93 %-98 %] 94 %  on  Flow (L/min):  [2-3.5] 2;   Device (Oxygen Therapy): nasal cannula  Body mass index is 25.62 kg/m².    Physical Exam   Constitutional: She is oriented to person, place, and time. She appears well-developed and well-nourished. No distress.   HENT:   Head: Normocephalic and atraumatic.   Neck: No JVD present.   Cardiovascular: Normal rate and regular rhythm.  Exam reveals no friction rub.    No murmur heard.  Pulmonary/Chest: No stridor. Tachypnea noted. She has no decreased breath sounds. She has wheezes (improved). She has rales ( improved).   Abnormal lung sounds throughout.   Abdominal: Soft. Bowel sounds are normal. She exhibits no distension (tympanic but good BS). There is no tenderness. There is no guarding.   Musculoskeletal: She exhibits no edema or tenderness.   Neurological: She is alert and oriented to person, place, and time.   Skin: Skin is warm and dry. She is not diaphoretic. No erythema. No pallor.   Psychiatric: She has a normal mood and affect. Her behavior is normal.   Vitals reviewed.      Results Review:       I reviewed the patient's new clinical results.    Results from last 7 days  Lab Units 18  0525 18  0525 18  0531 18  0548   WBC 10*3/mm3 19.35* 11.73* 12.71* 10.19   HEMOGLOBIN g/dL 9.5* 9.8* 9.5* 8.9*   PLATELETS 10*3/mm3 312 282 232 182       Results from  last 7 days  Lab Units 11/09/18  0525 11/08/18  0525 11/07/18  0531 11/06/18  0548   SODIUM mmol/L 133* 133* 132* 130*   POTASSIUM mmol/L 3.8 4.0 3.5 4.0   CHLORIDE mmol/L 95* 95* 96* 98   CO2 mmol/L 25.2 27.4 23.9 22.8   BUN mg/dL 18 11 6* 11   CREATININE mg/dL 0.91 0.58 0.69 0.71   GLUCOSE mg/dL 168* 154* 122* 103*   Estimated Creatinine Clearance: 33.9 mL/min (by C-G formula based on SCr of 0.91 mg/dL).      Results from last 7 days  Lab Units 11/09/18 0525 11/08/18  0525 11/07/18  0531 11/06/18  0548   CALCIUM mg/dL 8.7 8.7 8.3 8.0*           arformoterol 15 mcg Nebulization BID - RT   aspirin 81 mg Oral Daily   cholecalciferol 1,000 Units Oral Daily   enoxaparin 40 mg Subcutaneous Q24H   guaiFENesin 1,200 mg Oral Q12H   ipratropium-albuterol 3 mL Nebulization 4x Daily - RT   levoFLOXacin 500 mg Intravenous Q24H   methylPREDNISolone sodium succinate 40 mg Intravenous Q8H   polyethylene glycol 17 g Oral Daily   sodium chloride 3 mL Intravenous Q12H   vitamin B-12 1,000 mcg Oral Daily      Diet Regular      Assessment/Plan      Active Hospital Problems    Diagnosis Date Noted   • **Community acquired pneumonia of right lower lobe of lung (CMS/[facility]) [J18.1] 11/04/2018   • Leukocytosis [D72.829] 11/04/2018   • Acute respiratory failure with hypoxia (CMS/[facility]) [J96.01] 11/04/2018   • Bacterial pneumonia [J15.9] 11/04/2018   • Sepsis (CMS/[facility]) [A41.9] 11/04/2018   • Nonrheumatic aortic valve stenosis [I35.0] 09/01/2017      Resolved Hospital Problems    Diagnosis Date Noted Date Resolved   No resolved problems to display.     Ms. Mcknight is a 89 y.o. female  with a history of tuberculosis in her 20s (treated) who presented to outside hospital emergency room for pneumonia.     Community acquired pneumonia: She has had a viral bronchitis with RSV and now a secondary bacterial infection. Strep pneumo and legionella antigens are negative.  We'll continue with Levaquin for 7 days (D6). She does meet sepsis criteria with  fever 101.4 at home and leukocytosis of 17,000.  -Continue when necessary albuterol and scheduled duo nebs  -CXR reviewed  -Improvement with IV steroids, changed to PO  -Agree with stopping tessalon, will add prn hycodan  -Appreciate Pulmonology, D/W Dr. Casey Veronica     The patient has remote history of TB 60 years ago and was concerned for reactivation with steroids       Suspect hyponatremia is related to dehydration and from her pneumonia itself.  Improved slowly daily     Anemia: DEMIAN, will start PO replacement     Diarrhea: resolved    The patient's daughter, Betzy, a healthcare surrogate and she wishes to be a full code     DISPO: rehabilitation.  Probably ready on Saturday    Norm Nova MD  [facility]  11/09/18  4:50 PM

## 2018-11-09 NOTE — PLAN OF CARE
Problem: Patient Care Overview  Goal: Plan of Care Review  Outcome: Ongoing (interventions implemented as appropriate)   11/09/18 3268   Coping/Psychosocial   Plan of Care Reviewed With patient   Plan of Care Review   Progress no change   OTHER   Outcome Summary Patient withouit report of pain or discomfort. Up to chair most of the afternoon. IS used. VSS. Will continue to monitor.       Problem: Skin Injury Risk (Adult)  Goal: Skin Health and Integrity  Outcome: Ongoing (interventions implemented as appropriate)      Problem: Infection, Risk/Actual (Adult)  Goal: Infection Prevention/Resolution  Outcome: Ongoing (interventions implemented as appropriate)      Problem: Fall Risk (Adult)  Goal: Absence of Fall  Outcome: Ongoing (interventions implemented as appropriate)      Problem: Pneumonia (Adult)  Goal: Signs and Symptoms of Listed Potential Problems Will be Absent, Minimized or Managed (Pneumonia)  Outcome: Ongoing (interventions implemented as appropriate)

## 2018-11-09 NOTE — PLAN OF CARE
Problem: Patient Care Overview  Goal: Plan of Care Review  Outcome: Ongoing (interventions implemented as appropriate)   11/09/18 1024   Coping/Psychosocial   Plan of Care Reviewed With patient;daughter   Plan of Care Review   Progress improving   OTHER   Outcome Summary Pt tolerated increased activity today. Pt with noted DF less than neutral with arom. Pt instructed in gentle/as tolerated seated posterior leg stretch. Pt educated on 2 reps only with 15-20 count hold. Pt former downhill  and . PLOF is indep community ambulation without O2 or AD. Pt is highly motivated to return to PLOF. Pt with obvious fatigue during ambulation. Pt requires cues to slow down, rest due to shakiness following amb. O2 sat on 4 L 94% today following amb.

## 2018-11-10 ENCOUNTER — APPOINTMENT (OUTPATIENT)
Dept: CT IMAGING | Facility: HOSPITAL | Age: 83
End: 2018-11-10

## 2018-11-10 LAB
ANION GAP SERPL CALCULATED.3IONS-SCNC: 9.1 MMOL/L
BASOPHILS # BLD AUTO: 0.02 10*3/MM3 (ref 0–0.2)
BASOPHILS NFR BLD AUTO: 0.1 % (ref 0–1.5)
BUN BLD-MCNC: 21 MG/DL (ref 8–23)
BUN/CREAT SERPL: 28.4 (ref 7–25)
CALCIUM SPEC-SCNC: 8.6 MG/DL (ref 8.2–9.6)
CHLORIDE SERPL-SCNC: 97 MMOL/L (ref 98–107)
CO2 SERPL-SCNC: 27.9 MMOL/L (ref 22–29)
CREAT BLD-MCNC: 0.74 MG/DL (ref 0.57–1)
DEPRECATED RDW RBC AUTO: 46.8 FL (ref 37–54)
EOSINOPHIL # BLD AUTO: 0 10*3/MM3 (ref 0–0.7)
EOSINOPHIL NFR BLD AUTO: 0 % (ref 0.3–6.2)
ERYTHROCYTE [DISTWIDTH] IN BLOOD BY AUTOMATED COUNT: 13.6 % (ref 11.7–13)
GFR SERPL CREATININE-BSD FRML MDRD: 74 ML/MIN/1.73
GLUCOSE BLD-MCNC: 144 MG/DL (ref 65–99)
HCT VFR BLD AUTO: 29 % (ref 35.6–45.5)
HGB BLD-MCNC: 9.3 G/DL (ref 11.9–15.5)
IMM GRANULOCYTES # BLD: 0.72 10*3/MM3 (ref 0–0.03)
IMM GRANULOCYTES NFR BLD: 4.5 % (ref 0–0.5)
LYMPHOCYTES # BLD AUTO: 1.18 10*3/MM3 (ref 0.9–4.8)
LYMPHOCYTES NFR BLD AUTO: 7.4 % (ref 19.6–45.3)
MCH RBC QN AUTO: 30.2 PG (ref 26.9–32)
MCHC RBC AUTO-ENTMCNC: 32.1 G/DL (ref 32.4–36.3)
MCV RBC AUTO: 94.2 FL (ref 80.5–98.2)
MONOCYTES # BLD AUTO: 1.16 10*3/MM3 (ref 0.2–1.2)
MONOCYTES NFR BLD AUTO: 7.3 % (ref 5–12)
NEUTROPHILS # BLD AUTO: 12.89 10*3/MM3 (ref 1.9–8.1)
NEUTROPHILS NFR BLD AUTO: 80.7 % (ref 42.7–76)
PLATELET # BLD AUTO: 301 10*3/MM3 (ref 140–500)
PMV BLD AUTO: 8.9 FL (ref 6–12)
POTASSIUM BLD-SCNC: 4.3 MMOL/L (ref 3.5–5.2)
RBC # BLD AUTO: 3.08 10*6/MM3 (ref 3.9–5.2)
SODIUM BLD-SCNC: 134 MMOL/L (ref 136–145)
WBC NRBC COR # BLD: 15.97 10*3/MM3 (ref 4.5–10.7)

## 2018-11-10 PROCEDURE — 80048 BASIC METABOLIC PNL TOTAL CA: CPT | Performed by: INTERNAL MEDICINE

## 2018-11-10 PROCEDURE — 94799 UNLISTED PULMONARY SVC/PX: CPT

## 2018-11-10 PROCEDURE — 25010000002 ENOXAPARIN PER 10 MG: Performed by: INTERNAL MEDICINE

## 2018-11-10 PROCEDURE — 63710000001 DIPHENHYDRAMINE PER 50 MG: Performed by: HOSPITALIST

## 2018-11-10 PROCEDURE — 25010000002 METHYLPREDNISOLONE PER 40 MG: Performed by: INTERNAL MEDICINE

## 2018-11-10 PROCEDURE — 71250 CT THORAX DX C-: CPT

## 2018-11-10 PROCEDURE — 85025 COMPLETE CBC W/AUTO DIFF WBC: CPT | Performed by: INTERNAL MEDICINE

## 2018-11-10 PROCEDURE — 25010000002 LEVOFLOXACIN PER 250 MG: Performed by: INTERNAL MEDICINE

## 2018-11-10 RX ADMIN — ENOXAPARIN SODIUM 40 MG: 40 INJECTION SUBCUTANEOUS at 18:26

## 2018-11-10 RX ADMIN — ASPIRIN 81 MG: 81 TABLET, DELAYED RELEASE ORAL at 09:41

## 2018-11-10 RX ADMIN — POLYETHYLENE GLYCOL 3350 17 G: 17 POWDER, FOR SOLUTION ORAL at 13:35

## 2018-11-10 RX ADMIN — ARFORMOTEROL TARTRATE 15 MCG: 15 SOLUTION RESPIRATORY (INHALATION) at 20:08

## 2018-11-10 RX ADMIN — HYDROCODONE BITARTRATE AND HOMATROPINE METHYLBROMIDE 5 ML: 5; 1.5 SOLUTION ORAL at 09:41

## 2018-11-10 RX ADMIN — HYDROCODONE BITARTRATE AND HOMATROPINE METHYLBROMIDE 5 ML: 5; 1.5 SOLUTION ORAL at 20:21

## 2018-11-10 RX ADMIN — METHYLPREDNISOLONE SODIUM SUCCINATE 40 MG: 40 INJECTION, POWDER, FOR SOLUTION INTRAMUSCULAR; INTRAVENOUS at 04:28

## 2018-11-10 RX ADMIN — VITAMIN D, TAB 1000IU (100/BT) 1000 UNITS: 25 TAB at 09:42

## 2018-11-10 RX ADMIN — FERROUS SULFATE TAB 325 MG (65 MG ELEMENTAL FE) 325 MG: 325 (65 FE) TAB at 09:41

## 2018-11-10 RX ADMIN — Medication 1000 MCG: at 09:41

## 2018-11-10 RX ADMIN — ARFORMOTEROL TARTRATE 15 MCG: 15 SOLUTION RESPIRATORY (INHALATION) at 08:17

## 2018-11-10 RX ADMIN — LEVOFLOXACIN 500 MG: 5 INJECTION, SOLUTION INTRAVENOUS at 13:35

## 2018-11-10 RX ADMIN — HYDROCODONE BITARTRATE AND HOMATROPINE METHYLBROMIDE 5 ML: 5; 1.5 SOLUTION ORAL at 04:28

## 2018-11-10 RX ADMIN — Medication 3 ML: at 20:21

## 2018-11-10 RX ADMIN — DIPHENHYDRAMINE HYDROCHLORIDE 25 MG: 25 CAPSULE ORAL at 20:21

## 2018-11-10 RX ADMIN — IPRATROPIUM BROMIDE AND ALBUTEROL SULFATE 3 ML: 2.5; .5 SOLUTION RESPIRATORY (INHALATION) at 12:26

## 2018-11-10 RX ADMIN — Medication 3 ML: at 09:42

## 2018-11-10 RX ADMIN — GUAIFENESIN 1200 MG: 600 TABLET, EXTENDED RELEASE ORAL at 20:21

## 2018-11-10 RX ADMIN — GUAIFENESIN 1200 MG: 600 TABLET, EXTENDED RELEASE ORAL at 09:41

## 2018-11-10 RX ADMIN — HYDROCODONE BITARTRATE AND HOMATROPINE METHYLBROMIDE 5 ML: 5; 1.5 SOLUTION ORAL at 14:22

## 2018-11-10 NOTE — SIGNIFICANT NOTE
11/10/18 1430   Rehab Treatment   Discipline physical therapist   Reason Treatment Not Performed other (see comments)  (checked back on patient in PM, nursing in room, will check again tomorrow)   Recommendation   PT - Next Appointment 11/11/18

## 2018-11-10 NOTE — PLAN OF CARE
Problem: Patient Care Overview  Goal: Plan of Care Review  Outcome: Ongoing (interventions implemented as appropriate)   11/10/18 0511   Coping/Psychosocial   Plan of Care Reviewed With patient;daughter   Plan of Care Review   Progress no change   OTHER   Outcome Summary Patients vitals stable. Patient denies pain this shift. Patient reports cough and PRN medication given twice this shift. Will continue to monitor.       Problem: Skin Injury Risk (Adult)  Goal: Skin Health and Integrity  Outcome: Ongoing (interventions implemented as appropriate)      Problem: Fall Risk (Adult)  Goal: Absence of Fall  Outcome: Ongoing (interventions implemented as appropriate)      Problem: Pneumonia (Adult)  Goal: Signs and Symptoms of Listed Potential Problems Will be Absent, Minimized or Managed (Pneumonia)  Outcome: Ongoing (interventions implemented as appropriate)

## 2018-11-10 NOTE — PROGRESS NOTES
Name: Shaina Mcknight ADMIT: 2018   : 10/4/1927  PCP: Fanta Shankar MD    MRN: [id number] LOS: 6 days   AGE/SEX: 89 y.o. female  ROOM: [facility]   Subjective    Feels much better  Cough improved with hycodan  soa better too.  Doing well of NC today  No nausea or vomiting or abdominal pain.     Denies chest pain or palpitations.     Objective   Vital Signs  Temp:  [97.2 °F (36.2 °C)-98.3 °F (36.8 °C)] 97.8 °F (36.6 °C)  Heart Rate:  [80-88] 80  Resp:  [18-20] 20  BP: (126-144)/(61-84) 140/61  SpO2:  [90 %-97 %] 90 %  on  Flow (L/min):  [2-3.5] 2;   Device (Oxygen Therapy): room air  Body mass index is 26.49 kg/m².    Physical Exam   Constitutional: She is oriented to person, place, and time. She appears well-developed and well-nourished. No distress.   HENT:   Head: Normocephalic and atraumatic.   Neck: No JVD present.   Cardiovascular: Normal rate and regular rhythm. Exam reveals no friction rub.   No murmur heard.  Pulmonary/Chest: No stridor. Tachypnea noted. She has no decreased breath sounds. She has no wheezes. She has rales ( improved).   Abnormal lung sounds throughout.   Abdominal: Soft. Bowel sounds are normal. She exhibits no distension (tympanic but good BS). There is no tenderness. There is no guarding.   Musculoskeletal: She exhibits no edema or tenderness.   Neurological: She is alert and oriented to person, place, and time.   Skin: Skin is warm and dry. She is not diaphoretic. No erythema. No pallor.   Psychiatric: She has a normal mood and affect. Her behavior is normal.   Vitals reviewed.      Results Review:       I reviewed the patient's new clinical results.  Results from last 7 days   Lab Units  11/10/18   0627  18   0525  18   0525  18   0531   WBC 10*3/mm3  15.97*  19.35*  11.73*  12.71*   HEMOGLOBIN g/dL  9.3*  9.5*  9.8*  9.5*   PLATELETS 10*3/mm3  301  312  282  232     Results from last 7 days   Lab Units  11/10/18   0627  18   0525  18   0525   11/07/18   0531   SODIUM mmol/L  134*  133*  133*  132*   POTASSIUM mmol/L  4.3  3.8  4.0  3.5   CHLORIDE mmol/L  97*  95*  95*  96*   CO2 mmol/L  27.9  25.2  27.4  23.9   BUN mg/dL  21  18  11  6*   CREATININE mg/dL  0.74  0.91  0.58  0.69   GLUCOSE mg/dL  144*  168*  154*  122*   Estimated Creatinine Clearance: 39.1 mL/min (by C-G formula based on SCr of 0.74 mg/dL).    Results from last 7 days   Lab Units  11/10/18   0627  11/09/18   0525  11/08/18   0525  11/07/18   0531   CALCIUM mg/dL  8.6  8.7  8.7  8.3           arformoterol 15 mcg Nebulization BID - RT   aspirin 81 mg Oral Daily   cholecalciferol 1,000 Units Oral Daily   enoxaparin 40 mg Subcutaneous Q24H   ferrous sulfate 325 mg Oral Daily With Breakfast   guaiFENesin 1,200 mg Oral Q12H   ipratropium-albuterol 3 mL Nebulization 4x Daily - RT   levoFLOXacin 500 mg Intravenous Q24H   methylPREDNISolone sodium succinate 40 mg Intravenous Q8H   polyethylene glycol 17 g Oral Daily   sodium chloride 3 mL Intravenous Q12H   vitamin B-12 1,000 mcg Oral Daily      Diet Regular      Assessment/Plan      Active Hospital Problems    Diagnosis Date Noted   • **Community acquired pneumonia of right lower lobe of lung (CMS/HCC) [J18.1] 11/04/2018   • Leukocytosis [D72.829] 11/04/2018   • Acute respiratory failure with hypoxia (CMS/[facility]) [J96.01] 11/04/2018   • Bacterial pneumonia [J15.9] 11/04/2018   • Sepsis (CMS/[facility]) [A41.9] 11/04/2018   • Nonrheumatic aortic valve stenosis [I35.0] 09/01/2017      Resolved Hospital Problems   No resolved problems to display.     Ms. Mcknight is a 89 y.o. female  with a history of tuberculosis in her 20s (treated) who presented to outside hospital emergency room for pneumonia.     Community acquired pneumonia: She has had a viral bronchitis with RSV and now a secondary bacterial infection. Strep pneumo and legionella antigens are negative.  Last dose of Levaquin today, 7 days. She did meet sepsis criteria with fever 101.4 at home and  leukocytosis of 17,000.  -Continue when necessary albuterol and scheduled duo nebs  -CXR reviewed  -Improvement with IV steroids, will dc today as no more wheezing  -prn hycodan  -add tessalon to allergy list  -Appreciate Pulmonology, D/W Dr. Casey Veronica  -CT chest today because rehab facility wants to make sure she doesn't have TB even though it was 60 years ago, results pending     The patient has remote history of TB 60 years ago and was concerned for reactivation with steroids       Suspect hyponatremia is related to dehydration and from her pneumonia itself.  Improved slowly daily     Anemia: DEMIAN, PO replacement     Diarrhea: resolved    The patient's daughter, Betzy, a healthcare surrogate and she wishes to be a full code     DISPO: rehabilitation.  Probably ready on Tomorrow    Norm Nova MD  [facility]  11/10/18  1:49 PM

## 2018-11-10 NOTE — PLAN OF CARE
Problem: Patient Care Overview  Goal: Plan of Care Review  Outcome: Ongoing (interventions implemented as appropriate)   11/10/18 5344   Coping/Psychosocial   Plan of Care Reviewed With patient;daughter;family   Plan of Care Review   Progress improving   OTHER   Outcome Summary Doing well. O2 only desaturated after a deep sleep; resaturated after a simple cough. Weak and plans to d/c to rehab in a/m. BM this am. Continue to monitor on RA.       Problem: Skin Injury Risk (Adult)  Goal: Skin Health and Integrity  Outcome: Ongoing (interventions implemented as appropriate)      Problem: Infection, Risk/Actual (Adult)  Goal: Infection Prevention/Resolution  Outcome: Ongoing (interventions implemented as appropriate)      Problem: Fall Risk (Adult)  Goal: Absence of Fall  Outcome: Ongoing (interventions implemented as appropriate)      Problem: Pneumonia (Adult)  Goal: Signs and Symptoms of Listed Potential Problems Will be Absent, Minimized or Managed (Pneumonia)  Outcome: Ongoing (interventions implemented as appropriate)

## 2018-11-10 NOTE — PROGRESS NOTES
Dr. ELEN Collins    [address]    11/10/2018    Patient ID:  Name:  Shaina Mcknight  MRN:  [id number]  10/4/1927  89 y.o.  female            CC/Reason for visit: Community-acquired pneumonia    HPI: Patient is feeling better.  Her cough is improving.  She had Hycodan last night for cough    Vitals:  Vitals:    11/10/18 0730 11/10/18 0817 11/10/18 1226 11/10/18 1347   BP: 134/84   140/61   BP Location: Right arm   Right arm   Patient Position: Lying   Lying   Pulse: 84 84 84 80   Resp: 20 20 20 20   Temp: 98.3 °F (36.8 °C)   97.8 °F (36.6 °C)   TempSrc: Oral   Oral   SpO2: 91% 94% 92% 90%   Weight:       Height:               Body mass index is 26.49 kg/m².    Intake/Output Summary (Last 24 hours) at 11/10/2018 1504  Last data filed at 11/10/2018 1347  Gross per 24 hour   Intake 600 ml   Output --   Net 600 ml       Exam:  GEN:  No distress, appears stated age  EYES:   EOMI, anicteric sclera bilat  ENT:    External ears/nose normal, OP clear  NECK:  Supple, midline trachea  LUNGS: Some coarse breath sounds bilaterally but no wheezing today, nonlabored breathing  CV:  Normal S1S2, without murmur, no edema  ABD:  Non tender, no enlarged liver or masses  EXT:  No cyanosis or clubbing    Scheduled meds:    arformoterol 15 mcg Nebulization BID - RT   aspirin 81 mg Oral Daily   cholecalciferol 1,000 Units Oral Daily   enoxaparin 40 mg Subcutaneous Q24H   ferrous sulfate 325 mg Oral Daily With Breakfast   guaiFENesin 1,200 mg Oral Q12H   ipratropium-albuterol 3 mL Nebulization 4x Daily - RT   levoFLOXacin 500 mg Intravenous Q24H   polyethylene glycol 17 g Oral Daily   sodium chloride 3 mL Intravenous Q12H   vitamin B-12 1,000 mcg Oral Daily     IV meds:                           Data Review:   I reviewed the patient's medications and new clinical results.  Lab Results   Component Value Date    CALCIUM 8.6 11/10/2018     Results from last 7 days   Lab Units  11/10/18   0627  11/09/18   0525  11/08/18    0525   SODIUM mmol/L  134*  133*  133*   POTASSIUM mmol/L  4.3  3.8  4.0   CHLORIDE mmol/L  97*  95*  95*   CO2 mmol/L  27.9  25.2  27.4   BUN mg/dL  21  18  11   CREATININE mg/dL  0.74  0.91  0.58   CALCIUM mg/dL  8.6  8.7  8.7   GLUCOSE mg/dL  144*  168*  154*   WBC 10*3/mm3  15.97*  19.35*  11.73*   HEMOGLOBIN g/dL  9.3*  9.5*  9.8*   PLATELETS 10*3/mm3  301  312  282     Results from last 7 days   Lab Units  11/05/18   0056   RESPCX   Light growth (2+) Normal Respiratory Nathalie     Results from last 7 days   Lab Units  11/04/18   1834   ADENOVIRUS DETECTION BY PCR   Not Detected   CORONAVIRUS 229E   Not Detected   CORONAVIRUS HKU1   Not Detected   CORONAVIRUS NL63   Not Detected   CORONAVIRUS OC43   Not Detected   HUMAN METAPNEUMOVIRUS   Not Detected   HUMAN RHINOVIRUS/ENTEROVIRUS   Not Detected   INFLUENZA B PCR   Not Detected   PARAINFLUENZA 1   Not Detected   PARAINFLUENZA VIRUS 2   Not Detected   PARAINFLUENZA VIRUS 3   Not Detected   PARAINFLUENZA VIRUS 4   Not Detected   BORDETELLA PERTUSSIS PCR   Not Detected   CHLAMYDOPHILA PNEUMONIAE PCR   Not Detected   MYCOPLAMA PNEUMO PCR   Not Detected   INFLUENZA A PCR   Not Detected   INFLUENZA A H3   Not Detected   INFLUENZA A H1   Not Detected   RSV, PCR   Detected*           ASSESSMENT:     Community acquired pneumonia of right lower lobe of lung (CMS/HCC)    Nonrheumatic aortic valve stenosis    Leukocytosis    Acute respiratory failure with hypoxia (CMS/HCC)    Bacterial pneumonia    Sepsis (CMS/HCC)  RSV bronchiolitis      PLAN:  She has completed antibiotics today.  She developed RSV bronchiolitis with secondary bacterial infection and was treated empirically for pneumonia.  She is no longer actively wheezing.  She may be discharged today.  Agree with no steroids.  Agree with bronchodilators only as needed.  Wean oxygen as tolerated.        Job Collins MD  11/10/2018

## 2018-11-10 NOTE — PROGRESS NOTES
Continued Stay Note  [facility]     Patient Name: Shaina Mcknight  MRN: [id number]  Today's Date: 11/10/2018    Admit Date: 11/4/2018    Discharge Plan     Row Name 11/10/18 0940       Plan    Plan  [facility] has bed available today for skilled care (pending CT documentation).    Patient/Family in Agreement with Plan  yes    Plan Comments  Received call from [facility]/Sarah and she states pt will have a skilled bed available today at [facility] if there is CT documentation that pt is TB free (Hx of TB). Updated MD. Updated PRO Alves who states she is with pt/family at this time and she will update them as well. CCP to follow............JW        Discharge Codes    No documentation.       Expected Discharge Date and Time     Expected Discharge Date Expected Discharge Time    Nov 9, 2018             Chloé Edmond RN

## 2018-11-11 VITALS
TEMPERATURE: 98.7 F | HEART RATE: 90 BPM | OXYGEN SATURATION: 90 % | RESPIRATION RATE: 18 BRPM | BODY MASS INDEX: 26.89 KG/M2 | SYSTOLIC BLOOD PRESSURE: 118 MMHG | DIASTOLIC BLOOD PRESSURE: 59 MMHG | HEIGHT: 61 IN | WEIGHT: 142.42 LBS

## 2018-11-11 LAB
ANION GAP SERPL CALCULATED.3IONS-SCNC: 10.7 MMOL/L
BUN BLD-MCNC: 18 MG/DL (ref 8–23)
BUN/CREAT SERPL: 24 (ref 7–25)
CALCIUM SPEC-SCNC: 8.2 MG/DL (ref 8.2–9.6)
CHLORIDE SERPL-SCNC: 97 MMOL/L (ref 98–107)
CO2 SERPL-SCNC: 26.3 MMOL/L (ref 22–29)
CREAT BLD-MCNC: 0.75 MG/DL (ref 0.57–1)
DEPRECATED RDW RBC AUTO: 46.2 FL (ref 37–54)
ERYTHROCYTE [DISTWIDTH] IN BLOOD BY AUTOMATED COUNT: 13.7 % (ref 11.7–13)
GFR SERPL CREATININE-BSD FRML MDRD: 72 ML/MIN/1.73
GLUCOSE BLD-MCNC: 99 MG/DL (ref 65–99)
HCT VFR BLD AUTO: 29.9 % (ref 35.6–45.5)
HGB BLD-MCNC: 9.8 G/DL (ref 11.9–15.5)
LYMPHOCYTES # BLD MANUAL: 2.06 10*3/MM3 (ref 0.9–4.8)
LYMPHOCYTES NFR BLD MANUAL: 12 % (ref 5–12)
LYMPHOCYTES NFR BLD MANUAL: 14 % (ref 19.6–45.3)
MCH RBC QN AUTO: 30.1 PG (ref 26.9–32)
MCHC RBC AUTO-ENTMCNC: 32.8 G/DL (ref 32.4–36.3)
MCV RBC AUTO: 91.7 FL (ref 80.5–98.2)
METAMYELOCYTES NFR BLD MANUAL: 2 % (ref 0–0)
MONOCYTES # BLD AUTO: 1.77 10*3/MM3 (ref 0.2–1.2)
MYELOCYTES NFR BLD MANUAL: 5 % (ref 0–0)
NEUTROPHILS # BLD AUTO: 9.87 10*3/MM3 (ref 1.9–8.1)
NEUTROPHILS NFR BLD MANUAL: 67 % (ref 42.7–76)
PLAT MORPH BLD: NORMAL
PLATELET # BLD AUTO: 353 10*3/MM3 (ref 140–500)
PMV BLD AUTO: 9.1 FL (ref 6–12)
POTASSIUM BLD-SCNC: 3.6 MMOL/L (ref 3.5–5.2)
RBC # BLD AUTO: 3.26 10*6/MM3 (ref 3.9–5.2)
RBC MORPH BLD: NORMAL
SCAN SLIDE: NORMAL
SODIUM BLD-SCNC: 134 MMOL/L (ref 136–145)
WBC MORPH BLD: NORMAL
WBC NRBC COR # BLD: 14.73 10*3/MM3 (ref 4.5–10.7)

## 2018-11-11 PROCEDURE — 85025 COMPLETE CBC W/AUTO DIFF WBC: CPT | Performed by: INTERNAL MEDICINE

## 2018-11-11 PROCEDURE — 85007 BL SMEAR W/DIFF WBC COUNT: CPT | Performed by: INTERNAL MEDICINE

## 2018-11-11 PROCEDURE — 94799 UNLISTED PULMONARY SVC/PX: CPT

## 2018-11-11 PROCEDURE — 25010000002 LEVOFLOXACIN PER 250 MG: Performed by: INTERNAL MEDICINE

## 2018-11-11 PROCEDURE — 80048 BASIC METABOLIC PNL TOTAL CA: CPT | Performed by: INTERNAL MEDICINE

## 2018-11-11 PROCEDURE — 97116 GAIT TRAINING THERAPY: CPT | Performed by: PHYSICAL THERAPIST

## 2018-11-11 PROCEDURE — 63710000001 PREDNISONE PER 1 MG: Performed by: INTERNAL MEDICINE

## 2018-11-11 PROCEDURE — 97110 THERAPEUTIC EXERCISES: CPT | Performed by: PHYSICAL THERAPIST

## 2018-11-11 RX ORDER — ARFORMOTEROL TARTRATE 15 UG/2ML
15 SOLUTION RESPIRATORY (INHALATION)
Qty: 120 ML
Start: 2018-11-11 | End: 2019-12-10

## 2018-11-11 RX ORDER — PREDNISONE 20 MG/1
TABLET ORAL
Start: 2018-11-11 | End: 2018-11-17

## 2018-11-11 RX ORDER — DIPHENHYDRAMINE HCL 25 MG
25 CAPSULE ORAL NIGHTLY PRN
Start: 2018-11-11 | End: 2019-12-10

## 2018-11-11 RX ORDER — LOPERAMIDE HYDROCHLORIDE 2 MG/1
2 CAPSULE ORAL 4 TIMES DAILY PRN
Start: 2018-11-11 | End: 2020-01-23

## 2018-11-11 RX ORDER — ALBUTEROL SULFATE 2.5 MG/3ML
2.5 SOLUTION RESPIRATORY (INHALATION) EVERY 6 HOURS PRN
Refills: 12
Start: 2018-11-11 | End: 2019-12-10

## 2018-11-11 RX ORDER — GUAIFENESIN 600 MG/1
1200 TABLET, EXTENDED RELEASE ORAL EVERY 12 HOURS SCHEDULED
Start: 2018-11-11 | End: 2019-12-10

## 2018-11-11 RX ORDER — IPRATROPIUM BROMIDE AND ALBUTEROL SULFATE 2.5; .5 MG/3ML; MG/3ML
3 SOLUTION RESPIRATORY (INHALATION)
Qty: 360 ML
Start: 2018-11-11 | End: 2019-12-10

## 2018-11-11 RX ORDER — PREDNISONE 20 MG/1
40 TABLET ORAL
Status: DISCONTINUED | OUTPATIENT
Start: 2018-11-11 | End: 2018-11-11 | Stop reason: HOSPADM

## 2018-11-11 RX ADMIN — IPRATROPIUM BROMIDE AND ALBUTEROL SULFATE 3 ML: 2.5; .5 SOLUTION RESPIRATORY (INHALATION) at 11:06

## 2018-11-11 RX ADMIN — Medication 1000 MCG: at 09:42

## 2018-11-11 RX ADMIN — GUAIFENESIN 1200 MG: 600 TABLET, EXTENDED RELEASE ORAL at 09:42

## 2018-11-11 RX ADMIN — ARFORMOTEROL TARTRATE 15 MCG: 15 SOLUTION RESPIRATORY (INHALATION) at 08:07

## 2018-11-11 RX ADMIN — POLYETHYLENE GLYCOL 3350 17 G: 17 POWDER, FOR SOLUTION ORAL at 09:43

## 2018-11-11 RX ADMIN — LEVOFLOXACIN 500 MG: 5 INJECTION, SOLUTION INTRAVENOUS at 12:12

## 2018-11-11 RX ADMIN — PREDNISONE 40 MG: 20 TABLET ORAL at 12:12

## 2018-11-11 RX ADMIN — FERROUS SULFATE TAB 325 MG (65 MG ELEMENTAL FE) 325 MG: 325 (65 FE) TAB at 09:42

## 2018-11-11 RX ADMIN — HYDROCODONE BITARTRATE AND HOMATROPINE METHYLBROMIDE 5 ML: 5; 1.5 SOLUTION ORAL at 00:42

## 2018-11-11 RX ADMIN — ALBUTEROL SULFATE 2.5 MG: 2.5 SOLUTION RESPIRATORY (INHALATION) at 04:30

## 2018-11-11 RX ADMIN — Medication 3 ML: at 09:00

## 2018-11-11 RX ADMIN — ASPIRIN 81 MG: 81 TABLET, DELAYED RELEASE ORAL at 09:42

## 2018-11-11 RX ADMIN — VITAMIN D, TAB 1000IU (100/BT) 1000 UNITS: 25 TAB at 09:42

## 2018-11-11 RX ADMIN — HYDROCODONE BITARTRATE AND HOMATROPINE METHYLBROMIDE 5 ML: 5; 1.5 SOLUTION ORAL at 09:52

## 2018-11-11 NOTE — DISCHARGE SUMMARY
Name: Shaina Mcknight  Age: 89 y.o.  Sex: female  :  10/4/1927  MRN: [id number]         Primary Care Physician: Fanta Shankar MD      Date of Admission:  2018  Date of Discharge:  2018      Chief Complaint:  No chief complaint on file.      Presenting Problem/History of Present Illness:  Bacterial pneumonia [J15.9]     Discharge Diagnosis:  Active Hospital Problems    Diagnosis Date Noted   • **Community acquired pneumonia of right lower lobe of lung (CMS/[facility]ly [facility]) [J18.1] 2018   • Leukocytosis [D72.829] 2018   • Acute respiratory failure with hypoxia (CMS/[facility]) [J96.01] 2018   • Bacterial pneumonia [J15.9] 2018   • Sepsis (CMS/[facility]) [A41.9] 2018   • Nonrheumatic aortic valve stenosis [I35.0] 2017      Resolved Hospital Problems   No resolved problems to display.       Secondary Diagnoses:  Past Medical History:   Diagnosis Date   • Acute pain of left shoulder due to trauma    • Aortic valve regurgitation     trace   • Aortic valve stenosis     mild   • SPENCER (dyspnea on exertion)    • Fall     going up the steps   • Heart murmur    • Mitral valve regurgitation     mild to moderate   • Pulmonary valve regurgitation     trace   • Tricuspid valve regurgitation     mild to moderate         Consults:  Consult Orders (all) (From admission, onward)    Start     Ordered    18 1059  Inpatient Pulmonology Consult  Once     Specialty:  Pulmonary Disease  Provider:  Casey Veronica MD    18 1058          Procedures Performed:    CHEST CT WITHOUT CONTRAST     HISTORY: Pneumonia follow-up. Prior tuberculosis.     TECHNIQUE: Axial noncontrast chest CT is provided and correlated with  chest x-rays 2018, 2018 and chest CT 2017 and  2011.      Radiation dose reduction techniques were utilized, including automated  exposure control and exposure modulation based on body size.     FINDINGS: Again observed is a collection of high attenuation  fluid and  air in the posterior aspect of the lower cervical region. This has been  present since 2011 and probably represents ingested material within a  Zenker's diverticulum. The esophagus otherwise has a normal noncontrast  CT appearance. There is scattered atheromatous vascular calcification.  No lymphadenopathy is present. There is no significant abnormality  identified in the visualized upper abdomen. The gallbladder is in situ  and decompressed.     Some pleural and parenchymal scarring at the lung apices, more extensive  on the right than the left, is no different than on chest CT from April 2011. There is some emphysematous change in the lungs. There are small  areas of opacified parenchyma along the medial lower lobes, tiny volume  of pleural fluid on the left. Some vague, patchy ground glass opacity is  also observed in the lower lobes, right more than left. The airways are  patent. The lungs are otherwise clear.     There are old central superior and inferior endplate compression  deformities at L1 and an old inferior endplate T7 compression deformity.  These are no different than on previous CT from September 2017. No bone  or body wall lesion is identified.     IMPRESSION:  There are small areas of atelectasis or nonspecific  infiltrative change in the lower lobes with some scattered areas of  vague ground glass opacity, also nonspecific. These changes could  represent a combination of atelectasis, aspiration pneumonitis or  nonspecific pneumonia. There is no specific finding to prove or disprove  tuberculosis as an etiology.     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 There  are also old lower thoracic and upper lumbar compression deformities.     PORTABLE AP ERECT CHEST DATED 11/07/2018 AT 0548 HOURS     FINDINGS: When compared to  erect AP and lateral projection radiographs  of the chest on 11/04/2018, lung volumes are slightly diminished. Patchy  bilateral perihilar to basilar atelectasis or infiltrates are present.  The costophrenic angles are relatively sharp. No pneumothorax is seen.  Skin fold artifact over the right chest is demonstrated. The cardiac  silhouette is upper normal for projection. Aortic uncoiling and  calcification are again demonstrated. Variable diffuse interstitial  prominence in the lungs is again demonstrated with nodularity again  noted in the right upper chest. No acute change in bony thorax is seen.  Degenerative change in the spine and severe degenerative change at the  shoulders are again demonstrated. Oxygen cannula tubing is present about  the neck. Monitoring lead wires are present.     CONCLUSION: Lung volumes are slightly diminished. Fairly extensive  patchy atelectasis or infiltrates in the perihilar to basilar right lung  and traces of patchy atelectasis or infiltrate in the perihilar to lower  left lung are present. Follow-up recommended.    XR CHEST PA AND LATERAL-     Clinical: Pneumonia     COMPARISON 11/10/2016 chest radiograph and CT 9/13/2017     FINDINGS: There is again demonstrated chronic pleural and parenchymal  change and old granulomatous disease, no acute consolidation has  developed. Patchy area of infiltrate is suggested in the retrocardiac  left lower lobe. No pulmonary edema, heart size is normal. The  mediastinum is stable.     CONCLUSION: Patchy area of infiltrate is suggested in the retrocardiac  portion of the left lower lobe. Chest is otherwise similar to  11/10/2016.    Hospital Course:    The patient is a very pleasant 89-year-old female with a remote history of TB who was directly admitted to our service for pneumonia and hypoxia.    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  yesterday for shortness of breath, cough congestion, fever at home up to 101.4.  She was placed on azithromycin but due to difficulty breathing she presented to [facility] earlier today.  There they diagnosed her with pneumonia and she was transferred to us for further care.  Symptoms started 3 days ago.  Symptoms are worse with exertion and there are no relieving factors.  The patient was not hypoxic but her O2 saturations were low at 93% when she arrived, low of 90% on room air.     At the outside hospital she received albuterol nebulizer, 500 mg of Tequin, Tylenol and Robitussin.  Labs revealed a sodium of 129, creatinine of 1.08, white blood cell count of 17.4.  Asked x-ray is outside hospital showed faint patch of pneumonia in the right lower lobe.     Of note, the patient had been taking care of her  for the last 4-6 weeks and subsequently developed diarrhea, fatigue.  He passed away 2 weeks ago.  The patient's diarrhea has now resolved.  History of Present Illness       Hospital course:    She was admitted to our service and started on broad spectrum antibiotics with Levaquin.  She completed a 7 day course.  She tested positive for RSV and had a secondary bacterial infection related to her RSV.  She also had acute respiratory failure with hypoxia and her hypoxia continues at this time.  She was initially started on bronchodilators but due to history of TB the patient was hesitant to start steroids.  She continued to worsen.  She had worsening symptoms including cough and shortness of breath as well as hypoxia.  Repeat chest x-ray showed worsening pneumonia.  I consulted pulmonary and she agreed to IV steroids.  Very shortly after placing her on IV steroids she had improvement of her symptoms and oxygenation.  We stopped her steroids since she did not have any more wheezing.  That night she developed worsening cough and wheezing.  Now I have placed her on a prednisone taper over the next 7 days.   She received prednisone 40 mg ×1 here in the hospital and to be tapered as below.  I recommend repeat chest x-ray in 6 weeks to follow up on resolution of her pneumonia.  Of note, a CT of her chest was obtained per the rehabilitation facility and confirmed pneumonia.  There is no evidence of TB.  There was a likely Zenker's diverticulum just been there for years.  This does raise the possibility of aspiration and would likely need to be evaluated at some point in the future.  I discussed discharge plan with the patient and her daughters at the bedside and they are in agreement.  Of note, due to the patient having a rough night last night as needed for the option of staying in the hospital next her day or going to rehabilitation today and she decided to go to rehabilitation today.  I do believe medically she is stable to be discharged to rehabilitation today.  She is going to require oxygen at night and likely intermittently during the day.  She'll be discharged with a flutter valve and incentive spirometry as well.    Physical Exam:  Temp:  [97.4 °F (36.3 °C)-97.8 °F (36.6 °C)] 97.8 °F (36.6 °C)  Heart Rate:  [72-94] 91  Resp:  [18-22] 20  BP: (140-158)/(55-75) 158/75  Body mass index is 26.91 kg/m².  Physical Exam   Constitutional: She is oriented to person, place, and time. She appears well-developed and well-nourished.   Elderly and Frail appearing.  No acute distress.  Does appear more comfortable than she has previously   HENT:   Head: Normocephalic and atraumatic.   Eyes: EOM are normal. Pupils are equal, round, and reactive to light.   Cardiovascular: Normal rate and regular rhythm. Exam reveals no friction rub.   No murmur heard.  Pulmonary/Chest: Effort normal and breath sounds normal. No respiratory distress. She has no wheezes. She has no rales.   First day her lungs sounded clear   Abdominal: Soft. Bowel sounds are normal. She exhibits no distension. There is no tenderness. There is no guarding.    Musculoskeletal: She exhibits no tenderness.   Neurological: She is alert and oriented to person, place, and time.   Skin: Skin is warm and dry. No erythema.   Psychiatric: She has a normal mood and affect. Her behavior is normal.   Vitals reviewed.      Condition on Discharge:  Stable.    Discharge Disposition:   [facility]    Allergies:   Allergies   Allergen Reactions   • Molds & Smuts Shortness Of Breath   • Penicillins Hives   • Tessalon [Benzonatate] Delirium   • Oyster Shell        Discharge Medications:      Discharge Medications      New Medications      Instructions Start Date   albuterol (2.5 MG/3ML) 0.083% nebulizer solution  Commonly known as:  PROVENTIL   2.5 mg, Nebulization, Every 6 Hours PRN      arformoterol 15 MCG/2ML nebulizer solution  Commonly known as:  BROVANA   15 mcg, Nebulization, 2 Times Daily - RT      diphenhydrAMINE 25 mg capsule  Commonly known as:  BENADRYL   25 mg, Oral, Nightly PRN      guaiFENesin 600 MG 12 hr tablet  Commonly known as:  MUCINEX   1,200 mg, Oral, Every 12 Hours Scheduled      HYDROcodone-homatropine 5-1.5 MG/5ML syrup  Commonly known as:  HYCODAN   5 mL, Oral, Every 4 Hours PRN      ipratropium-albuterol 0.5-2.5 mg/3 ml nebulizer  Commonly known as:  DUO-NEB   3 mL, Nebulization, 4 Times Daily - RT      loperamide 2 MG capsule  Commonly known as:  IMODIUM   2 mg, Oral, 4 Times Daily PRN      polyethylene glycol pack packet  Commonly known as:  MIRALAX   17 g, Oral, Daily      predniSONE 20 MG tablet  Commonly known as:  DELTASONE   Take 2 tablets by mouth Daily With Breakfast for 2 days, THEN 1 tablet Daily for 2 days, THEN 0.5 tablets Daily With Breakfast for 2 days.   Start Date:  11/11/2018        Continue These Medications      Instructions Start Date   aspirin 81 MG tablet   81 mg, Oral, Daily      azithromycin 250 MG tablet  Commonly known as:  ZITHROMAX Z-ROSIE   Take 2 tablets at the same time Day 1 and then 1 tablet every 24 hour thereafter.       CALCIUM 600 1500 (600 Ca) MG tablet  Generic drug:  Calcium Carbonate   600 mg, Oral, Daily      IRON PO   Oral      MULTIPLE VITAMIN PO   1 tablet, Oral, Daily      OSTEO BI-FLEX ADV TRIPLE ST PO   1 tablet, Oral, Daily      vitamin B-12 1000 MCG tablet  Commonly known as:  CYANOCOBALAMIN   1,000 mcg, Oral, Daily      Vitamin D (Cholecalciferol) 1000 units capsule   1 capsule, Oral, Daily             Discharge Diet:   Diet Instructions     Advance Diet As Tolerated            Activity at Discharge:   Activity Instructions     Activity as Tolerated            Follow-up Appointments:  No future appointments.   Contact information for follow-up providers     Casey Veronica MD Follow up.    Specialty:  Pulmonary Disease  Contact information:  [address]  [place] [id number] [phone]             Fanta Shankar MD .    Specialty:  Internal Medicine  Contact information:  [address]  [place] [address]  [phone]                   Contact information for after-discharge care     Destination     [facility] Follow up.    Service:  Skilled Nursing  Contact information:  [address] [place] [address]  [phone]                               Test Results Pending at Discharge:  None     Code status:   Code Status and Medical Interventions:   Ordered at: 11/07/18 1150     Level Of Support Discussed With:    Patient    Health Care Surrogate     Code Status:    No CPR     Medical Interventions (Level of Support Prior to Arrest):    Full     Comments:    Would want intubation if indicated but no measures in event of cardiac arrest.         Norm Nova MD  [facility]  11/11/18  11:21 AM      Time: greater than 30 minutes.

## 2018-11-11 NOTE — THERAPY TREATMENT NOTE
Acute Care - Physical Therapy Treatment Note  [facility]     Patient Name: Shaina Mcknight  : 10/4/1927  MRN: [id number]  Today's Date: 2018  Onset of Illness/Injury or Date of Surgery: 18          Admit Date: 2018    Visit Dx:  No diagnosis found.  Patient Active Problem List   Diagnosis   • TB (tuberculosis)   • Dyspnea on exertion   • Nonrheumatic aortic valve stenosis   • Community acquired pneumonia of right lower lobe of lung (CMS/HCC)   • Leukocytosis   • Acute respiratory failure with hypoxia (CMS/HCC)   • Bacterial pneumonia   • Sepsis (CMS/HCC)       Therapy Treatment    Rehabilitation Treatment Summary     Row Name 18 1027             Treatment Time/Intention    Discipline  physical therapist  -MP      Document Type  therapy note (daily note)  -MP      Subjective Information  no complaints  -MP      Mode of Treatment  physical therapy  -MP      Patient/Family Observations  Ms. Mcknight was in bed with 2 daughters in the room with her.  -MP      Total Minutes, Physical Therapy Treatment  30  -MP      Patient Effort  good  -MP      Recorded by [MP] Félix Mercedes, PT 18 1041      Row Name 18 1027             Vital Signs    O2 Delivery Pre Treatment  room air  -MP      O2 Delivery Intra Treatment  supplemental O2 2 L/minute  -MP      Post Patient Position  Sitting  -MP      Recorded by [MP] Félix Mercedes, PT 18 1041      Row Name 18 1027             Bed Mobility Assessment/Treatment    Bed Mobility Assessment/Treatment  rolling right;supine-sit  -MP      Rolling Right Island (Bed Mobility)  independent  -MP      Supine-Sit Island (Bed Mobility)  independent  -MP      Recorded by [MP] Félix Mercedes, PT 18 1041      Row Name 18 1027             Transfer Assessment/Treatment    Transfer Assessment/Treatment  sit-stand transfer;stand-sit transfer  -MP      Maintains Weight-bearing Status (Transfers)  able to maintain  -MP      Recorded  by [MP] Félix Mercedes, PT 11/11/18 1041      Row Name 11/11/18 1027             Sit-Stand Transfer    Sit-Stand Dozier (Transfers)  contact guard  -MP      Assistive Device (Sit-Stand Transfers)  walker, front-wheeled  -MP      Recorded by [MP] Félix Mercedes, PT 11/11/18 1041      Row Name 11/11/18 1027             Stand-Sit Transfer    Stand-Sit Dozier (Transfers)  contact guard  -MP      Assistive Device (Stand-Sit Transfers)  walker, front-wheeled  -MP      Recorded by [MP] Félix Mercedes, PT 11/11/18 1041      Row Name 11/11/18 1027             Gait/Stairs Assessment/Training    Dozier Level (Gait)  contact guard  -MP      Assistive Device (Gait)  walker, front-wheeled  -MP      Distance in Feet (Gait)  150 feet, rested for 2 minutes with pursed lip breaths x 4, and then ambulated another 150 feet.  -MP      Pattern (Gait)  step-through  -MP      Maintains Weight-bearing Status (Gait)  able to maintain  -MP      Recorded by [MP] Félix Mercedes, PT 11/11/18 1041      Row Name 11/11/18 1027             Positioning and Restraints    Pre-Treatment Position  in bed  -MP      Post Treatment Position  bed sitting on side of bed with daughters in the room  -MP      Recorded by [MP] Félix Mercedes, PT 11/11/18 1041      Row Name 11/11/18 1027             Outcome Summary/Treatment Plan (PT)    Daily Summary of Progress (PT)  progress toward functional goals as expected  -MP      Barriers to Overall Progress (PT)  Respiratory issues  -MP      Plan for Continued Treatment (PT)  Daily   -MP      Anticipated Discharge Disposition (PT)  home with home health  -MP      Recorded by [MP] Félix Mercedes, PT 11/11/18 1041        User Key  (r) = Recorded By, (t) = Taken By, (c) = Cosigned By    Initials Name Effective Dates Discipline    MP Félix Mercedes, PT 06/22/16 -  PT             Physical Therapy Education     Title: PT OT SLP Therapies (Done)     Topic: Physical Therapy (Done)     Point: Mobility training (Done)      Learning Progress Summary           Patient Eager, E, VU by MP at 11/11/2018 10:41 AM    Acceptance, E,TB,D, VU,NR by SV at 11/9/2018 10:24 AM    Acceptance, E, VU by AR at 11/8/2018  3:59 PM   Family Eager, E, VU by MP at 11/11/2018 10:41 AM    Acceptance, E,TB,D, VU,NR by SV at 11/9/2018 10:24 AM                   Point: Home exercise program (Done)     Learning Progress Summary           Patient Eager, E, VU by MP at 11/11/2018 10:41 AM    Acceptance, E,TB,D, VU,NR by SV at 11/9/2018 10:24 AM    Acceptance, E, VU by AR at 11/8/2018  3:59 PM   Family Eager, E, VU by MP at 11/11/2018 10:41 AM    Acceptance, E,TB,D, VU,NR by SV at 11/9/2018 10:24 AM                   Point: Body mechanics (Done)     Learning Progress Summary           Patient Eager, E, VU by MP at 11/11/2018 10:41 AM    Acceptance, E,TB,D, VU,NR by SV at 11/9/2018 10:24 AM    Acceptance, E, VU by AR at 11/8/2018  3:59 PM   Family Eager, E, VU by MP at 11/11/2018 10:41 AM    Acceptance, E,TB,D, VU,NR by SV at 11/9/2018 10:24 AM                   Point: Precautions (Done)     Learning Progress Summary           Patient Eager, E, VU by MP at 11/11/2018 10:41 AM    Acceptance, E,TB,D, VU,NR by SV at 11/9/2018 10:24 AM    Acceptance, E, VU by AR at 11/8/2018  3:59 PM   Family Eager, E, VU by MP at 11/11/2018 10:41 AM    Acceptance, E,TB,D, VU,NR by SV at 11/9/2018 10:24 AM                               User Key     Initials Effective Dates Name Provider Type Discipline    AR 04/03/18 -  Jeanna Bui, PT Physical Therapist PT    SV 04/03/18 -  Angela Diggs, PT Physical Therapist PT     06/22/16 -  Félix Mercedes, PT Physical Therapist PT                PT Recommendation and Plan  Anticipated Discharge Disposition (PT): home with home health  Outcome Summary/Treatment Plan (PT)  Daily Summary of Progress (PT): progress toward functional goals as expected  Barriers to Overall Progress (PT): Respiratory issues  Plan for Continued Treatment (PT):  Daily   Anticipated Discharge Disposition (PT): home with home health  Plan of Care Reviewed With: patient, family, other (see comments)(daughters)  Progress: improving  Outcome Summary: Ms. Mcknight ambulated 150 feet, rested two minutes and then ambulated another 150 feet.  She used a front wheeled walker and supplemental oxygen at 2 L/minute.  Outcome Measures     Row Name 11/11/18 1000 11/09/18 1000          How much help from another person do you currently need...    Turning from your back to your side while in flat bed without using bedrails?  4  -MP  4  -SV     Moving from lying on back to sitting on the side of a flat bed without bedrails?  4  -MP  4  -SV     Moving to and from a bed to a chair (including a wheelchair)?  3  -MP  3  -SV     Standing up from a chair using your arms (e.g., wheelchair, bedside chair)?  3  -MP  3  -SV     Climbing 3-5 steps with a railing?  3  -MP  3  -SV     To walk in hospital room?  3  -MP  3  -SV     AM-PAC 6 Clicks Score  20  -MP  20  -SV        Functional Assessment    Outcome Measure Options  AM-PAC 6 Clicks Basic Mobility (PT)  -MP  AM-PAC 6 Clicks Basic Mobility (PT)  -SV       User Key  (r) = Recorded By, (t) = Taken By, (c) = Cosigned By    Initials Name Provider Type    SV Angela Diggs, PT Physical Therapist    Félix Rodriguez PT Physical Therapist         Time Calculation:   PT Charges     Row Name 11/11/18 1043             Time Calculation    Start Time  1010  -MP      Stop Time  1040  -MP      Time Calculation (min)  30 min  -MP        User Key  (r) = Recorded By, (t) = Taken By, (c) = Cosigned By    Initials Name Provider Type    Félix Rodriguez PT Physical Therapist        Therapy Suggested Charges     Code   Minutes Charges    None           Therapy Charges for Today     Code Description Service Date Service Provider Modifiers Qty    98620673212 HC PT THER PROC EA 15 MIN 11/11/2018 Félix Mercedes PT GP 1    09306723138 HC GAIT TRAINING EA 15 MIN  11/11/2018 Félix Mercedes, PT GP 1          PT G-Codes  Outcome Measure Options: AM-PAC 6 Clicks Basic Mobility (PT)  AM-PAC 6 Clicks Score: 20    Félix Mercedes, PT  11/11/2018

## 2018-11-11 NOTE — PLAN OF CARE
Problem: Patient Care Overview  Goal: Plan of Care Review   11/11/18 1041   Coping/Psychosocial   Plan of Care Reviewed With patient;family;other (see comments)  (daughters)   Plan of Care Review   Progress improving   OTHER   Outcome Summary Ms. Mcknight ambulated 150 feet, rested two minutes and then ambulated another 150 feet. She used a front wheeled walker and supplemental oxygen at 2 L/minute.

## 2018-11-11 NOTE — NURSING NOTE
Family c/o pt had a drop in spO2 as she fell asleep this afternoon, so placed her on 2L oxygen via [place]. Called and informed   Dr. Cunningham as pt was going to be transported by family in their car.    Coordinated for portable oxygen from Respiratory Therapy for transport in car to [facility]. Oxygen tank by pt bedside and pt ready to be transferred to [facility]. Report called to PRO Acharya at [facility]. Discharge packet with AVS, and discharge summary and d/c papers handed to pt and family. Also faxed discharge summary to [facility].

## 2018-11-11 NOTE — PLAN OF CARE
Problem: Patient Care Overview  Goal: Plan of Care Review  Outcome: Ongoing (interventions implemented as appropriate)     VSS on RA. Patient was coughing during the assessment and requested the PRN cough med. Administered Hycodan. Daughters at bedside, concerned regarding cough as pt is possibly going to be discharged to a facility today.  Continues to be in contact isolation for RSV.  Will continue to monitor.       11/11/18 1027   Coping/Psychosocial   Plan of Care Reviewed With patient;daughter   Plan of Care Review   Progress improving       Problem: Skin Injury Risk (Adult)  Goal: Skin Health and Integrity  Outcome: Ongoing (interventions implemented as appropriate)   11/11/18 1027   Skin Injury Risk (Adult)   Skin Health and Integrity making progress toward outcome       Problem: Infection, Risk/Actual (Adult)  Goal: Infection Prevention/Resolution  Outcome: Ongoing (interventions implemented as appropriate)   11/11/18 1027   Infection, Risk/Actual (Adult)   Infection Prevention/Resolution making progress toward outcome       Problem: Fall Risk (Adult)  Goal: Absence of Fall  Outcome: Ongoing (interventions implemented as appropriate)   11/11/18 1027   Fall Risk (Adult)   Absence of Fall making progress toward outcome       Problem: Pneumonia (Adult)  Goal: Signs and Symptoms of Listed Potential Problems Will be Absent, Minimized or Managed (Pneumonia)  Outcome: Ongoing (interventions implemented as appropriate)   11/11/18 1027   Goal/Outcome Evaluation   Problems Assessed (Pneumonia) all

## 2018-11-12 NOTE — PROGRESS NOTES
Case Management Discharge Note    Final Note: Pt was dc'd to a skilled bed @ [facility]    Destination - Selection Complete      Service Provider Request Status Selected Services Address Phone Number Fax Number    [facility] [address] [facility] [phone]       Anamaria Wagner RN 11/9/2018 1051    Called to [facility] and left [facility]                   Durable Medical Equipment      No service has been selected for the patient.      Dialysis/Infusion      No service has been selected for the patient.      Home Medical Care      No service has been selected for the patient.      Community Resources      No service has been selected for the patient.        Other: Other    Final Discharge Disposition Code: 03 - skilled nursing facility (SNF)

## 2018-11-18 NOTE — PLAN OF CARE
Problem: Patient Care Overview  Goal: Plan of Care Review  Outcome: Ongoing (interventions implemented as appropriate)   11/11/18 0529   Coping/Psychosocial   Plan of Care Reviewed With patient;family   Plan of Care Review   Progress no change   OTHER   Outcome Summary Patients vitals stable. Patient denies pain and discomfort. Patient had some coughing and PRN cough medication given. Patient requires oxygen when sleep at 2 liters. Patients daughter at bedside. Will continue to monitor.       Problem: Skin Injury Risk (Adult)  Goal: Skin Health and Integrity  Outcome: Ongoing (interventions implemented as appropriate)      Problem: Fall Risk (Adult)  Goal: Absence of Fall  Outcome: Ongoing (interventions implemented as appropriate)      Problem: Pneumonia (Adult)  Goal: Signs and Symptoms of Listed Potential Problems Will be Absent, Minimized or Managed (Pneumonia)  Outcome: Ongoing (interventions implemented as appropriate)         Allergy;

## 2018-11-29 ENCOUNTER — TRANSCRIBE ORDERS (OUTPATIENT)
Dept: ADMINISTRATIVE | Facility: HOSPITAL | Age: 83
End: 2018-11-29

## 2018-11-29 ENCOUNTER — OFFICE (OUTPATIENT)
Dept: URBAN - METROPOLITAN AREA CLINIC 75 | Facility: CLINIC | Age: 83
End: 2018-11-29
Payer: COMMERCIAL

## 2018-11-29 VITALS
HEART RATE: 84 BPM | SYSTOLIC BLOOD PRESSURE: 142 MMHG | WEIGHT: 134 LBS | HEIGHT: 60 IN | DIASTOLIC BLOOD PRESSURE: 58 MMHG

## 2018-11-29 DIAGNOSIS — R91.8 PULMONARY INFILTRATE: Primary | ICD-10-CM

## 2018-11-29 DIAGNOSIS — R13.10 PROBLEMS WITH SWALLOWING AND MASTICATION: ICD-10-CM

## 2018-11-29 DIAGNOSIS — K22.5 DIVERTICULUM OF ESOPHAGUS, ACQUIRED: ICD-10-CM

## 2018-11-29 DIAGNOSIS — K22.5 DIVERTICULUM OF ESOPHAGUS, ACQUIRED: Primary | ICD-10-CM

## 2018-11-29 DIAGNOSIS — D50.9 IRON DEFICIENCY ANEMIA, UNSPECIFIED: ICD-10-CM

## 2018-11-29 DIAGNOSIS — R13.10 DYSPHAGIA, UNSPECIFIED: ICD-10-CM

## 2018-11-29 DIAGNOSIS — D50.9 IRON DEFICIENCY ANEMIA, UNSPECIFIED IRON DEFICIENCY ANEMIA TYPE: ICD-10-CM

## 2018-11-29 PROCEDURE — 99204 OFFICE O/P NEW MOD 45 MIN: CPT | Performed by: INTERNAL MEDICINE

## 2018-12-07 ENCOUNTER — HOSPITAL ENCOUNTER (OUTPATIENT)
Dept: GENERAL RADIOLOGY | Facility: HOSPITAL | Age: 83
Discharge: HOME | End: 2018-12-07
Admitting: INTERNAL MEDICINE

## 2018-12-07 DIAGNOSIS — D50.9 IRON DEFICIENCY ANEMIA, UNSPECIFIED IRON DEFICIENCY ANEMIA TYPE: ICD-10-CM

## 2018-12-07 DIAGNOSIS — K22.5 DIVERTICULUM OF ESOPHAGUS, ACQUIRED: ICD-10-CM

## 2018-12-07 DIAGNOSIS — R13.10 PROBLEMS WITH SWALLOWING AND MASTICATION: ICD-10-CM

## 2018-12-07 PROCEDURE — 74241: CPT

## 2018-12-07 PROCEDURE — 63710000001 BARIUM SULFATE 98 % RECONSTITUTED SUSPENSION: Performed by: INTERNAL MEDICINE

## 2018-12-07 PROCEDURE — A9270 NON-COVERED ITEM OR SERVICE: HCPCS | Performed by: INTERNAL MEDICINE

## 2018-12-07 PROCEDURE — 63710000001 BARIUM SULFATE 96 % RECONSTITUTED SUSPENSION: Performed by: INTERNAL MEDICINE

## 2018-12-07 PROCEDURE — 63710000001 SOD BICARB-CITRIC ACID-SIMETHICONE 2.21-1.53-0.04 G PACK: Performed by: INTERNAL MEDICINE

## 2018-12-07 PROCEDURE — 74247: CPT

## 2018-12-07 RX ADMIN — BARIUM SULFATE 183 ML: 960 POWDER, FOR SUSPENSION ORAL at 10:30

## 2018-12-07 RX ADMIN — ANTACID/ANTIFLATULENT 1 TABLET: 380; 550; 10; 10 GRANULE, EFFERVESCENT ORAL at 10:30

## 2018-12-07 RX ADMIN — BARIUM SULFATE 135 ML: 980 POWDER, FOR SUSPENSION ORAL at 10:30

## 2018-12-24 RX ORDER — IPRATROPIUM BROMIDE AND ALBUTEROL SULFATE 2.5; .5 MG/3ML; MG/3ML
SOLUTION RESPIRATORY (INHALATION)
Refills: 0 | OUTPATIENT
Start: 2018-12-24

## 2019-01-24 ENCOUNTER — HOSPITAL ENCOUNTER (OUTPATIENT)
Dept: CT IMAGING | Facility: HOSPITAL | Age: 84
Discharge: HOME | End: 2019-01-24
Attending: INTERNAL MEDICINE | Admitting: INTERNAL MEDICINE

## 2019-01-24 DIAGNOSIS — R91.8 PULMONARY INFILTRATE: ICD-10-CM

## 2019-01-24 PROCEDURE — 71250 CT THORAX DX C-: CPT

## 2019-04-29 ENCOUNTER — HOSPITAL ENCOUNTER (OUTPATIENT)
Dept: GENERAL RADIOLOGY | Facility: HOSPITAL | Age: 84
Discharge: HOME | End: 2019-04-29
Admitting: INTERNAL MEDICINE

## 2019-04-29 DIAGNOSIS — M19.072 ARTHRITIS OF LEFT ANKLE: ICD-10-CM

## 2019-04-29 PROCEDURE — 73610 X-RAY EXAM OF ANKLE: CPT

## 2019-12-10 ENCOUNTER — OFFICE VISIT (OUTPATIENT)
Dept: CARDIOLOGY | Facility: CLINIC | Age: 84
End: 2019-12-10

## 2019-12-10 VITALS
BODY MASS INDEX: 28.47 KG/M2 | HEART RATE: 77 BPM | DIASTOLIC BLOOD PRESSURE: 80 MMHG | SYSTOLIC BLOOD PRESSURE: 130 MMHG | HEIGHT: 60 IN | WEIGHT: 145 LBS

## 2019-12-10 DIAGNOSIS — R00.2 PALPITATIONS: ICD-10-CM

## 2019-12-10 DIAGNOSIS — I35.0 NONRHEUMATIC AORTIC VALVE STENOSIS: Chronic | ICD-10-CM

## 2019-12-10 DIAGNOSIS — R06.02 SOB (SHORTNESS OF BREATH): Primary | ICD-10-CM

## 2019-12-10 DIAGNOSIS — R06.09 DYSPNEA ON EXERTION: Chronic | ICD-10-CM

## 2019-12-10 DIAGNOSIS — J96.01 ACUTE RESPIRATORY FAILURE WITH HYPOXIA (HCC): ICD-10-CM

## 2019-12-10 PROCEDURE — 99214 OFFICE O/P EST MOD 30 MIN: CPT | Performed by: INTERNAL MEDICINE

## 2019-12-10 PROCEDURE — 93000 ELECTROCARDIOGRAM COMPLETE: CPT | Performed by: INTERNAL MEDICINE

## 2019-12-10 RX ORDER — CETIRIZINE HYDROCHLORIDE 10 MG/1
10 TABLET ORAL DAILY
COMMUNITY
End: 2022-04-26

## 2019-12-10 NOTE — PROGRESS NOTES
Subjective:     Encounter Date: 12/10/19        Patient ID: Shaina Mcknight is a 89 y.o. female.    Chief Complaint:  History of Present Illness    Dear Dr. Shankar,    I had the pleasure seeing this patient in the office today for evaluation.  She comes in for evaluation of dyspnea on exertion.    I saw her back in 2017.  She had a stress echocardiogram performed in 2016 that showed normal stress echocardiogram with no ischemia but mild aortic valve stenosis.  We followed up in 2017 and she was doing well but was still having dyspnea on exertion.  She has been following with pulmonary.  She does have a history of TB.  She then had a repeat echocardiogram performed 2018 that showed mild to moderate aortic stenosis, mild mitral stenosis, normal left lower function.  Normal pulmonary arterial pressure.    Patient was hospitalized with severe RSV and pneumonia.  She states that ever since then she has been having more shortness of breath.  She was seen by pulmonary and referred here for further evaluation.    He is also been having sensation to some palpitations and tachycardia at times.    She was hospitalized with severe RSV pneumonia in November 2018.  At one point during that time she was not felt that she would survive.  She does go to the gym several times a week but does note that she has some shortness of breath when she is there.  She does get some shortness of breath when she is doing activity, walking, or gardening.  She is not had a cough no hemoptysis.  No fever or chills.  She is not taking any nebulizer therapy.  She has woken up at night and felt short of breath.  She is on nocturnal oxygen.      The following portions of the patient's history were reviewed and updated as appropriate: allergies, current medications, past family history, past medical history, past social history, past surgical history and problem list.    Past Medical History:   Diagnosis Date   • Acute pain of left shoulder due to  trauma    • Aortic valve regurgitation     trace   • Aortic valve stenosis     mild   • SPENCER (dyspnea on exertion)    • Fall     going up the steps   • Heart murmur    • Mitral valve regurgitation     mild to moderate   • Pulmonary valve regurgitation     trace   • Tricuspid valve regurgitation     mild to moderate       Past Surgical History:   Procedure Laterality Date   • CATARACT EXTRACTION Bilateral    • HYSTERECTOMY         Social History     Socioeconomic History   • Marital status:      Spouse name: Not on file   • Number of children: Not on file   • Years of education: Not on file   • Highest education level: Not on file   Tobacco Use   • Smoking status: Never Smoker   • Smokeless tobacco: Never Used   • Tobacco comment: caffine use   Substance and Sexual Activity   • Alcohol use: Yes     Alcohol/week: 1.0 standard drinks     Types: 1 Glasses of wine per week     Comment: daily   • Drug use: No   • Sexual activity: Defer       Review of Systems   Constitution: Negative for chills, decreased appetite, fever and night sweats.   HENT: Negative for ear discharge, ear pain, hearing loss, nosebleeds and sore throat.    Eyes: Negative for blurred vision, double vision and pain.   Cardiovascular: Negative for cyanosis.   Respiratory: Negative for hemoptysis and sputum production.    Endocrine: Negative for cold intolerance and heat intolerance.   Hematologic/Lymphatic: Negative for adenopathy.   Skin: Negative for dry skin, itching, nail changes, rash and suspicious lesions.   Musculoskeletal: Negative for arthritis, gout, muscle cramps, muscle weakness, myalgias and neck pain.   Gastrointestinal: Negative for anorexia, bowel incontinence, constipation, diarrhea, dysphagia, hematemesis and jaundice.   Genitourinary: Negative for bladder incontinence, dysuria, flank pain, frequency, hematuria and nocturia.   Neurological: Negative for focal weakness, numbness, paresthesias and seizures.  "  Psychiatric/Behavioral: Negative for altered mental status, hallucinations, hypervigilance, suicidal ideas and thoughts of violence.   Allergic/Immunologic: Negative for persistent infections.         ECG 12 Lead  Date/Time: 12/10/2019 12:43 PM  Performed by: Braulio Sweet III, MD  Authorized by: Braulio Sweet III, MD   Comparison: compared with previous ECG   Similar to previous ECG  Rhythm: sinus rhythm  Rate: normal  Conduction: conduction normal  ST Segments: ST segments normal  T Waves: T waves normal  QRS axis: normal  Other findings: poor R wave progression    Clinical impression: normal ECG               Objective:     Vitals:    12/10/19 1009   BP: 130/80   Pulse: 77   Weight: 65.8 kg (145 lb)   Height: 152.4 cm (60\")         Physical Exam   Constitutional: She is oriented to person, place, and time. She appears well-developed and well-nourished. No distress.   HENT:   Head: Normocephalic and atraumatic.   Nose: Nose normal.   Mouth/Throat: Oropharynx is clear and moist.   Eyes: Pupils are equal, round, and reactive to light. Conjunctivae and EOM are normal. Right eye exhibits no discharge. Left eye exhibits no discharge.   Neck: Normal range of motion. Neck supple. No tracheal deviation present. No thyromegaly present.   Cardiovascular: Normal rate, regular rhythm, S1 normal, S2 normal and normal pulses. Exam reveals no S3.   Murmur heard.   Harsh midsystolic murmur is present with a grade of 1/6 at the upper right sternal border radiating to the neck.  Pulmonary/Chest: Effort normal and breath sounds normal. No stridor. No respiratory distress. She exhibits no tenderness.   Abdominal: Soft. Bowel sounds are normal. She exhibits no distension and no mass. There is no tenderness. There is no rebound and no guarding.   Musculoskeletal: Normal range of motion. She exhibits no tenderness or deformity.   Lymphadenopathy:     She has no cervical adenopathy.   Neurological: She is alert and oriented to " person, place, and time. She has normal reflexes.   Skin: Skin is warm and dry. No rash noted. She is not diaphoretic. No erythema.   Psychiatric: She has a normal mood and affect. Thought content normal.       Lab Review:             Performed        Assessment:          Diagnosis Plan   1. SOB (shortness of breath)  Adult Transthoracic Echo Complete W/ Cont if Necessary Per Protocol    Holter Monitor - 24 Hour    ECG 12 Lead   2. Dyspnea on exertion  Adult Transthoracic Echo Complete W/ Cont if Necessary Per Protocol    Holter Monitor - 24 Hour    ECG 12 Lead   3. Nonrheumatic aortic valve stenosis  Adult Transthoracic Echo Complete W/ Cont if Necessary Per Protocol    Holter Monitor - 24 Hour    ECG 12 Lead   4. Acute respiratory failure with hypoxia (CMS/HCC)  Adult Transthoracic Echo Complete W/ Cont if Necessary Per Protocol    Holter Monitor - 24 Hour    ECG 12 Lead   5. Palpitations  Adult Transthoracic Echo Complete W/ Cont if Necessary Per Protocol    Holter Monitor - 24 Hour    ECG 12 Lead          Plan:       1.  Dyspnea on exertion- patient has valvular heart disease that in the past is been mild mitral stenosis and mild to moderate aortic stenosis.  We will repeat an echocardiogram; is been 13 months since her last echo  2.  Palpitations and sensation of tachycardia- we will arrange for a Holter monitor to be placed.  3.  Bronchospastic lung disease, history of RSV pneumonia, history of DVT-followed by pulmonary  4.  Nocturnal oxygen therapy per pulmonary    Thank you very much for allowing us to participate in the care of this pleasant patient.  Please don't hesitate to call if I can be of assistance in any way.      Current Outpatient Medications:   •  aspirin 81 MG tablet, Take 81 mg by mouth Daily., Disp: , Rfl:   •  Calcium Carbonate (CALCIUM 600) 1500 (600 Ca) MG tablet, Take 600 mg by mouth Daily., Disp: , Rfl:   •  cetirizine (zyrTEC) 10 MG tablet, Take 10 mg by mouth Daily., Disp: , Rfl:   •   IRON PO, Take 65 mg by mouth Daily., Disp: , Rfl:   •  Misc Natural Products (OSTEO BI-FLEX ADV TRIPLE ST PO), Take 1 tablet by mouth Daily., Disp: , Rfl:   •  MULTIPLE VITAMIN PO, Take 1 tablet by mouth Daily., Disp: , Rfl:   •  vitamin B-12 (CYANOCOBALAMIN) 1000 MCG tablet, Take 1,000 mcg by mouth Daily., Disp: , Rfl:   •  Vitamin D, Cholecalciferol, 1000 units capsule, Take 1 capsule by mouth Daily., Disp: , Rfl:   •  loperamide (IMODIUM) 2 MG capsule, Take 1 capsule by mouth 4 (Four) Times a Day As Needed for Diarrhea., Disp: , Rfl:

## 2019-12-17 ENCOUNTER — HOSPITAL ENCOUNTER (OUTPATIENT)
Dept: CARDIOLOGY | Facility: HOSPITAL | Age: 84
Discharge: HOME | End: 2019-12-17
Admitting: INTERNAL MEDICINE

## 2019-12-17 VITALS
HEIGHT: 60 IN | BODY MASS INDEX: 28.47 KG/M2 | HEART RATE: 68 BPM | SYSTOLIC BLOOD PRESSURE: 120 MMHG | DIASTOLIC BLOOD PRESSURE: 80 MMHG | WEIGHT: 145 LBS

## 2019-12-17 DIAGNOSIS — J96.01 ACUTE RESPIRATORY FAILURE WITH HYPOXIA (HCC): ICD-10-CM

## 2019-12-17 DIAGNOSIS — I35.0 NONRHEUMATIC AORTIC VALVE STENOSIS: ICD-10-CM

## 2019-12-17 DIAGNOSIS — R06.02 SOB (SHORTNESS OF BREATH): ICD-10-CM

## 2019-12-17 DIAGNOSIS — R00.2 PALPITATIONS: ICD-10-CM

## 2019-12-17 DIAGNOSIS — R06.09 DYSPNEA ON EXERTION: ICD-10-CM

## 2019-12-17 LAB
AORTIC DIMENSIONLESS INDEX: 0.6 (DI)
ASCENDING AORTA: 3.3 CM
AV MEAN GRADIENT POST STRESS: 23 MMHG
AV PEAK GRADIENT POST STRESS: 13 MMHG
BH CV ECHO MEAS - ACS: 1.4 CM
BH CV ECHO MEAS - AI DEC SLOPE: 168.8 CM/SEC^2
BH CV ECHO MEAS - AI MAX PG: 39 MMHG
BH CV ECHO MEAS - AI MAX VEL: 312.4 CM/SEC
BH CV ECHO MEAS - AI P1/2T: 542.1 MSEC
BH CV ECHO MEAS - AO MAX PG (FULL): 20.7 MMHG
BH CV ECHO MEAS - AO MAX PG: 23.4 MMHG
BH CV ECHO MEAS - AO MEAN PG (FULL): 11.9 MMHG
BH CV ECHO MEAS - AO MEAN PG: 13.4 MMHG
BH CV ECHO MEAS - AO ROOT AREA (BSA CORRECTED): 1.9
BH CV ECHO MEAS - AO ROOT AREA: 7.2 CM^2
BH CV ECHO MEAS - AO ROOT DIAM: 3 CM
BH CV ECHO MEAS - AO V2 MAX: 241.9 CM/SEC
BH CV ECHO MEAS - AO V2 MEAN: 170.4 CM/SEC
BH CV ECHO MEAS - AO V2 VTI: 57 CM
BH CV ECHO MEAS - ASC AORTA: 3.3 CM
BH CV ECHO MEAS - AVA(I,A): 1 CM^2
BH CV ECHO MEAS - AVA(I,D): 1 CM^2
BH CV ECHO MEAS - AVA(V,A): 0.9 CM^2
BH CV ECHO MEAS - AVA(V,D): 0.9 CM^2
BH CV ECHO MEAS - BSA(HAYCOCK): 1.7 M^2
BH CV ECHO MEAS - BSA: 1.6 M^2
BH CV ECHO MEAS - BZI_BMI: 28.3 KILOGRAMS/M^2
BH CV ECHO MEAS - BZI_METRIC_HEIGHT: 152.4 CM
BH CV ECHO MEAS - BZI_METRIC_WEIGHT: 65.8 KG
BH CV ECHO MEAS - EDV(CUBED): 79.9 ML
BH CV ECHO MEAS - EDV(MOD-SP2): 52 ML
BH CV ECHO MEAS - EDV(MOD-SP4): 59 ML
BH CV ECHO MEAS - EDV(TEICH): 83.4 ML
BH CV ECHO MEAS - EF(CUBED): 72.2 %
BH CV ECHO MEAS - EF(MOD-BP): 62 %
BH CV ECHO MEAS - EF(MOD-SP2): 59.6 %
BH CV ECHO MEAS - EF(MOD-SP4): 64.4 %
BH CV ECHO MEAS - EF(TEICH): 64.2 %
BH CV ECHO MEAS - ESV(CUBED): 22.2 ML
BH CV ECHO MEAS - ESV(MOD-SP2): 21 ML
BH CV ECHO MEAS - ESV(MOD-SP4): 21 ML
BH CV ECHO MEAS - ESV(TEICH): 29.9 ML
BH CV ECHO MEAS - FS: 34.7 %
BH CV ECHO MEAS - IVS/LVPW: 1.1
BH CV ECHO MEAS - IVSD: 1.1 CM
BH CV ECHO MEAS - LAT PEAK E' VEL: 9 CM/SEC
BH CV ECHO MEAS - LV DIASTOLIC VOL/BSA (35-75): 36.2 ML/M^2
BH CV ECHO MEAS - LV MASS(C)D: 147.1 GRAMS
BH CV ECHO MEAS - LV MASS(C)DI: 90.3 GRAMS/M^2
BH CV ECHO MEAS - LV MAX PG: 2.7 MMHG
BH CV ECHO MEAS - LV MEAN PG: 1.6 MMHG
BH CV ECHO MEAS - LV SYSTOLIC VOL/BSA (12-30): 12.9 ML/M^2
BH CV ECHO MEAS - LV V1 MAX: 82 CM/SEC
BH CV ECHO MEAS - LV V1 MEAN: 57.5 CM/SEC
BH CV ECHO MEAS - LV V1 VTI: 22.1 CM
BH CV ECHO MEAS - LVIDD: 4.3 CM
BH CV ECHO MEAS - LVIDS: 2.8 CM
BH CV ECHO MEAS - LVLD AP2: 7.8 CM
BH CV ECHO MEAS - LVLD AP4: 7.5 CM
BH CV ECHO MEAS - LVLS AP2: 6.6 CM
BH CV ECHO MEAS - LVLS AP4: 6.6 CM
BH CV ECHO MEAS - LVOT AREA (M): 2.5 CM^2
BH CV ECHO MEAS - LVOT AREA: 2.7 CM^2
BH CV ECHO MEAS - LVOT DIAM: 1.8 CM
BH CV ECHO MEAS - LVPWD: 0.96 CM
BH CV ECHO MEAS - MED PEAK E' VEL: 8 CM/SEC
BH CV ECHO MEAS - MR MAX PG: 153.5 MMHG
BH CV ECHO MEAS - MR MAX VEL: 619.4 CM/SEC
BH CV ECHO MEAS - MV A DUR: 0.12 SEC
BH CV ECHO MEAS - MV A MAX VEL: 87.3 CM/SEC
BH CV ECHO MEAS - MV DEC SLOPE: 220.8 CM/SEC^2
BH CV ECHO MEAS - MV DEC TIME: 0.25 SEC
BH CV ECHO MEAS - MV E MAX VEL: 55.8 CM/SEC
BH CV ECHO MEAS - MV E/A: 0.64
BH CV ECHO MEAS - MV MAX PG: 6.5 MMHG
BH CV ECHO MEAS - MV MEAN PG: 2.2 MMHG
BH CV ECHO MEAS - MV P1/2T MAX VEL: 56.3 CM/SEC
BH CV ECHO MEAS - MV P1/2T: 74.6 MSEC
BH CV ECHO MEAS - MV V2 MAX: 127.6 CM/SEC
BH CV ECHO MEAS - MV V2 MEAN: 64.5 CM/SEC
BH CV ECHO MEAS - MV V2 VTI: 24.3 CM
BH CV ECHO MEAS - MVA P1/2T LCG: 3.9 CM^2
BH CV ECHO MEAS - MVA(P1/2T): 2.9 CM^2
BH CV ECHO MEAS - MVA(VTI): 2.4 CM^2
BH CV ECHO MEAS - PA ACC TIME: 0.14 SEC
BH CV ECHO MEAS - PA MAX PG (FULL): 3.2 MMHG
BH CV ECHO MEAS - PA MAX PG: 4.3 MMHG
BH CV ECHO MEAS - PA PR(ACCEL): 17.2 MMHG
BH CV ECHO MEAS - PA V2 MAX: 103.2 CM/SEC
BH CV ECHO MEAS - PULM A REVS DUR: 0.12 SEC
BH CV ECHO MEAS - PULM A REVS VEL: 27.6 CM/SEC
BH CV ECHO MEAS - PULM DIAS VEL: 34.4 CM/SEC
BH CV ECHO MEAS - PULM S/D: 1.3
BH CV ECHO MEAS - PULM SYS VEL: 44.6 CM/SEC
BH CV ECHO MEAS - PVA(V,A): 1.8 CM^2
BH CV ECHO MEAS - PVA(V,D): 1.8 CM^2
BH CV ECHO MEAS - QP/QS: 0.77
BH CV ECHO MEAS - RV MAX PG: 1.1 MMHG
BH CV ECHO MEAS - RV MEAN PG: 0.66 MMHG
BH CV ECHO MEAS - RV V1 MAX: 52.4 CM/SEC
BH CV ECHO MEAS - RV V1 MEAN: 37.8 CM/SEC
BH CV ECHO MEAS - RV V1 VTI: 12.8 CM
BH CV ECHO MEAS - RVOT AREA: 3.5 CM^2
BH CV ECHO MEAS - RVOT DIAM: 2.1 CM
BH CV ECHO MEAS - SI(AO): 252.9 ML/M^2
BH CV ECHO MEAS - SI(CUBED): 35.4 ML/M^2
BH CV ECHO MEAS - SI(LVOT): 36.1 ML/M^2
BH CV ECHO MEAS - SI(MOD-SP2): 19 ML/M^2
BH CV ECHO MEAS - SI(MOD-SP4): 23.3 ML/M^2
BH CV ECHO MEAS - SI(TEICH): 32.9 ML/M^2
BH CV ECHO MEAS - SV(AO): 411.8 ML
BH CV ECHO MEAS - SV(CUBED): 57.7 ML
BH CV ECHO MEAS - SV(LVOT): 58.7 ML
BH CV ECHO MEAS - SV(MOD-SP2): 31 ML
BH CV ECHO MEAS - SV(MOD-SP4): 38 ML
BH CV ECHO MEAS - SV(RVOT): 45.1 ML
BH CV ECHO MEAS - SV(TEICH): 53.5 ML
BH CV ECHO MEAS - TAPSE (>1.6): 2.9 CM2
BH CV ECHO MEASUREMENTS AVERAGE E/E' RATIO: 6.56
BH CV XLRA - RV BASE: 3 CM
BH CV XLRA - RV LENGTH: 5.9 CM
BH CV XLRA - RV MID: 2.1 CM
BH CV XLRA - TDI S': 16 CM/SEC
LEFT ATRIUM VOLUME INDEX: 22 ML/M2
SINUS: 3.6 CM
STJ: 3 CM

## 2019-12-17 PROCEDURE — 93306 TTE W/DOPPLER COMPLETE: CPT

## 2019-12-17 PROCEDURE — 93306 TTE W/DOPPLER COMPLETE: CPT | Performed by: INTERNAL MEDICINE

## 2019-12-18 ENCOUNTER — TELEPHONE (OUTPATIENT)
Dept: CARDIOLOGY | Facility: CLINIC | Age: 84
End: 2019-12-18

## 2019-12-18 NOTE — TELEPHONE ENCOUNTER
----- Message from Braulio Sweet III, MD sent at 12/18/2019  9:59 AM EST -----  Please call- looks very good for her- valves look better than prior study, and heart function is normal

## 2019-12-18 NOTE — TELEPHONE ENCOUNTER
Called and left a VM. Will go over results when pt calls us back.    Alondra Thomason, RN  Triage MG

## 2019-12-23 ENCOUNTER — TELEPHONE (OUTPATIENT)
Dept: CARDIOLOGY | Facility: CLINIC | Age: 84
End: 2019-12-23

## 2019-12-23 NOTE — TELEPHONE ENCOUNTER
----- Message from Braulio Sweet III, MD sent at 12/20/2019  4:09 PM EST -----  Please call- normal results

## 2019-12-24 NOTE — TELEPHONE ENCOUNTER
Patient notified of results and verbalized understanding  Montse Knutson RN  Triage nurse       Carac Counseling:  I discussed with the patient the risks of Carac including but not limited to erythema, scaling, itching, weeping, crusting, and pain. Clindamycin Counseling: I counseled the patient regarding use of clindamycin as an antibiotic for prophylactic and/or therapeutic purposes. Clindamycin is active against numerous classes of bacteria, including skin bacteria. Side effects may include nausea, diarrhea, gastrointestinal upset, rash, hives, yeast infections, and in rare cases, colitis. Ketoconazole Counseling:   Patient counseled regarding improving absorption with orange juice.  Adverse effects include but are not limited to breast enlargement, headache, diarrhea, nausea, upset stomach, liver function test abnormalities, taste disturbance, and stomach pain.  There is a rare possibility of liver failure that can occur when taking ketoconazole. The patient understands that monitoring of LFTs may be required, especially at baseline. The patient verbalized understanding of the proper use and possible adverse effects of ketoconazole.  All of the patient's questions and concerns were addressed. Picato Pregnancy And Lactation Text: This medication is Pregnancy Category C. It is unknown if this medication is excreted in breast milk. Simponi Counseling:  I discussed with the patient the risks of golimumab including but not limited to myelosuppression, immunosuppression, autoimmune hepatitis, demyelinating diseases, lymphoma, and serious infections.  The patient understands that monitoring is required including a PPD at baseline and must alert us or the primary physician if symptoms of infection or other concerning signs are noted. Cyclosporine Counseling:  I discussed with the patient the risks of cyclosporine including but not limited to hypertension, gingival hyperplasia,myelosuppression, immunosuppression, liver damage, kidney damage, neurotoxicity, lymphoma, and serious infections. The patient understands that monitoring is required including baseline blood pressure, CBC, CMP, lipid panel and uric acid, and then 1-2 times monthly CMP and blood pressure. Propranolol Counseling:  I discussed with the patient the risks of propranolol including but not limited to low heart rate, low blood pressure, low blood sugar, restlessness and increased cold sensitivity. They should call the office if they experience any of these side effects. Erivedge Pregnancy And Lactation Text: This medication is Pregnancy Category X and is absolutely contraindicated during pregnancy. It is unknown if it is excreted in breast milk. Ketoconazole Pregnancy And Lactation Text: This medication is Pregnancy Category C and it isn't know if it is safe during pregnancy. It is also excreted in breast milk and breast feeding isn't recommended. Cyclosporine Pregnancy And Lactation Text: This medication is Pregnancy Category C and it isn't know if it is safe during pregnancy. This medication is excreted in breast milk. Propranolol Pregnancy And Lactation Text: This medication is Pregnancy Category C and it isn't known if it is safe during pregnancy. It is excreted in breast milk. Simponi Pregnancy And Lactation Text: The risk during pregnancy and breastfeeding is uncertain with this medication. Cimzia Counseling:  I discussed with the patient the risks of Cimzia including but not limited to immunosuppression, allergic reactions and infections.  The patient understands that monitoring is required including a PPD at baseline and must alert us or the primary physician if symptoms of infection or other concerning signs are noted. Cimzia Pregnancy And Lactation Text: This medication crosses the placenta but can be considered safe in certain situations. Cimzia may be excreted in breast milk. Finasteride Counseling:  I discussed with the patient the risks of use of finasteride including but not limited to decreased libido, decreased ejaculate volume, gynecomastia, and depression. Women should not handle medication.  All of the patient's questions and concerns were addressed. Carac Pregnancy And Lactation Text: This medication is Pregnancy Category X and contraindicated in pregnancy and in women who may become pregnant. It is unknown if this medication is excreted in breast milk. Clindamycin Pregnancy And Lactation Text: This medication can be used in pregnancy if certain situations. Clindamycin is also present in breast milk. Protopic Counseling: Patient may experience a mild burning sensation during topical application. Protopic is not approved in children less than 2 years of age. There have been case reports of hematologic and skin malignancies in patients using topical calcineurin inhibitors although causality is questionable. Birth Control Pills Counseling: Birth Control Pill Counseling: I discussed with the patient the potential side effects of OCPs including but not limited to increased risk of stroke, heart attack, thrombophlebitis, deep venous thrombosis, hepatic adenomas, breast changes, GI upset, headaches, and depression.  The patient verbalized understanding of the proper use and possible adverse effects of OCPs. All of the patient's questions and concerns were addressed. Stelara Counseling:  I discussed with the patient the risks of ustekinumab including but not limited to immunosuppression, malignancy, posterior leukoencephalopathy syndrome, and serious infections.  The patient understands that monitoring is required including a PPD at baseline and must alert us or the primary physician if symptoms of infection or other concerning signs are noted. Terbinafine Counseling: Patient counseling regarding adverse effects of terbinafine including but not limited to headache, diarrhea, rash, upset stomach, liver function test abnormalities, itching, taste/smell disturbance, nausea, abdominal pain, and flatulence.  There is a rare possibility of liver failure that can occur when taking terbinafine.  The patient understands that a baseline LFT and kidney function test may be required. The patient verbalized understanding of the proper use and possible adverse effects of terbinafine.  All of the patient's questions and concerns were addressed. Gabapentin Counseling: I discussed with the patient the risks of gabapentin including but not limited to dizziness, somnolence, fatigue and ataxia. Terbinafine Pregnancy And Lactation Text: This medication is Pregnancy Category B and is considered safe during pregnancy. It is also excreted in breast milk and breast feeding isn't recommended. Finasteride Pregnancy And Lactation Text: This medication is absolutely contraindicated during pregnancy. It is unknown if it is excreted in breast milk. 5-Fu Counseling: 5-Fluorouracil Counseling:  I discussed with the patient the risks of 5-fluorouracil including but not limited to erythema, scaling, itching, weeping, crusting, and pain. Protopic Pregnancy And Lactation Text: This medication is Pregnancy Category C. It is unknown if this medication is excreted in breast milk when applied topically. Cosentyx Counseling:  I discussed with the patient the risks of Cosentyx including but not limited to worsening of Crohn's disease, immunosuppression, allergic reactions and infections.  The patient understands that monitoring is required including a PPD at baseline and must alert us or the primary physician if symptoms of infection or other concerning signs are noted. Doxycycline Counseling:  Patient counseled regarding possible photosensitivity and increased risk for sunburn.  Patient instructed to avoid sunlight, if possible.  When exposed to sunlight, patients should wear protective clothing, sunglasses, and sunscreen.  The patient was instructed to call the office immediately if the following severe adverse effects occur:  hearing changes, easy bruising/bleeding, severe headache, or vision changes.  The patient verbalized understanding of the proper use and possible adverse effects of doxycycline.  All of the patient's questions and concerns were addressed. Methotrexate Counseling:  Patient counseled regarding adverse effects of methotrexate including but not limited to nausea, vomiting, abnormalities in liver function tests. Patients may develop mouth sores, rash, diarrhea, and abnormalities in blood counts. The patient understands that monitoring is required including LFT's and blood counts.  There is a rare possibility of scarring of the liver and lung problems that can occur when taking methotrexate. Persistent nausea, loss of appetite, pale stools, dark urine, cough, and shortness of breath should be reported immediately. Patient advised to discontinue methotrexate treatment at least three months before attempting to become pregnant.  I discussed the need for folate supplements while taking methotrexate.  These supplements can decrease side effects during methotrexate treatment. The patient verbalized understanding of the proper use and possible adverse effects of methotrexate.  All of the patient's questions and concerns were addressed. Cosentyx Pregnancy And Lactation Text: This medication is Pregnancy Category B and is considered safe during pregnancy. It is unknown if this medication is excreted in breast milk. Gabapentin Pregnancy And Lactation Text: This medication is Pregnancy Category C and isn't considered safe during pregnancy. It is excreted in breast milk. Drysol Counseling:  I discussed with the patient the risks of drysol/aluminum chloride including but not limited to skin rash, itching, irritation, burning. Erythromycin Counseling:  I discussed with the patient the risks of erythromycin including but not limited to GI upset, allergic reaction, drug rash, diarrhea, increase in liver enzymes, and yeast infections. Doxycycline Pregnancy And Lactation Text: This medication is Pregnancy Category D and not consider safe during pregnancy. It is also excreted in breast milk but is considered safe for shorter treatment courses. Rhofade Counseling: Rhofade is a topical medication which can decrease superficial blood flow where applied. Side effects are uncommon and include stinging, redness and allergic reactions. Methotrexate Pregnancy And Lactation Text: This medication is Pregnancy Category X and is known to cause fetal harm. This medication is excreted in breast milk. Minocycline Counseling: Patient advised regarding possible photosensitivity and discoloration of the teeth, skin, lips, tongue and gums.  Patient instructed to avoid sunlight, if possible.  When exposed to sunlight, patients should wear protective clothing, sunglasses, and sunscreen.  The patient was instructed to call the office immediately if the following severe adverse effects occur:  hearing changes, easy bruising/bleeding, severe headache, or vision changes.  The patient verbalized understanding of the proper use and possible adverse effects of minocycline.  All of the patient's questions and concerns were addressed. Birth Control Pills Pregnancy And Lactation Text: This medication should be avoided if pregnant and for the first 30 days post-partum. Glycopyrrolate Counseling:  I discussed with the patient the risks of glycopyrrolate including but not limited to skin rash, drowsiness, dry mouth, difficulty urinating, and blurred vision. Drysol Pregnancy And Lactation Text: This medication is considered safe during pregnancy and breast feeding. Dupixent Counseling: I discussed with the patient the risks of dupilumab including but not limited to eye infection and irritation, cold sores, injection site reactions, worsening of asthma, allergic reactions and increased risk of parasitic infection.  Live vaccines should be avoided while taking dupilumab. Dupilumab will also interact with certain medications such as warfarin and cyclosporine. The patient understands that monitoring is required and they must alert us or the primary physician if symptoms of infection or other concerning signs are noted. Erythromycin Pregnancy And Lactation Text: This medication is Pregnancy Category B and is considered safe during pregnancy. It is also excreted in breast milk. Taltz Counseling: I discussed with the patient the risks of ixekizumab including but not limited to immunosuppression, serious infections, worsening of inflammatory bowel disease and drug reactions.  The patient understands that monitoring is required including a PPD at baseline and must alert us or the primary physician if symptoms of infection or other concerning signs are noted. Prednisone Counseling:  I discussed with the patient the risks of prolonged use of prednisone including but not limited to weight gain, insomnia, osteoporosis, mood changes, diabetes, susceptibility to infection, glaucoma and high blood pressure.  In cases where prednisone use is prolonged, patients should be monitored with blood pressure checks, serum glucose levels and an eye exam.  Additionally, the patient may need to be placed on GI prophylaxis, PCP prophylaxis, and calcium and vitamin D supplementation and/or a bisphosphonate.  The patient verbalized understanding of the proper use and the possible adverse effects of prednisone.  All of the patient's questions and concerns were addressed. Spironolactone Counseling: Patient advised regarding risks of diarrhea, abdominal pain, hyperkalemia, birth defects (for female patients), liver toxicity and renal toxicity. The patient may need blood work to monitor liver and kidney function and potassium levels while on therapy. The patient verbalized understanding of the proper use and possible adverse effects of spironolactone.  All of the patient's questions and concerns were addressed. Rhofade Pregnancy And Lactation Text: This medication has not been assigned a Pregnancy Risk Category. It is unknown if the medication is excreted in breast milk. Minocycline Pregnancy And Lactation Text: This medication is Pregnancy Category D and not consider safe during pregnancy. It is also excreted in breast milk. Detail Level: Simple Quinolones Counseling:  I discussed with the patient the risks of fluoroquinolones including but not limited to GI upset, allergic reaction, drug rash, diarrhea, dizziness, photosensitivity, yeast infections, liver function test abnormalities, tendonitis/tendon rupture. Glycopyrrolate Pregnancy And Lactation Text: This medication is Pregnancy Category B and is considered safe during pregnancy. It is unknown if it is excreted breast milk. Cimetidine Counseling:  I discussed with the patient the risks of Cimetidine including but not limited to gynecomastia, headache, diarrhea, nausea, drowsiness, arrhythmias, pancreatitis, skin rashes, psychosis, bone marrow suppression and kidney toxicity. Solaraze Pregnancy And Lactation Text: This medication is Pregnancy Category B and is considered safe. There is some data to suggest avoiding during the third trimester. It is unknown if this medication is excreted in breast milk. SSKI Counseling:  I discussed with the patient the risks of SSKI including but not limited to thyroid abnormalities, metallic taste, GI upset, fever, headache, acne, arthralgias, paraesthesias, lymphadenopathy, easy bleeding, arrhythmias, and allergic reaction. Spironolactone Pregnancy And Lactation Text: This medication can cause feminization of the male fetus and should be avoided during pregnancy. The active metabolite is also found in breast milk. Solaraze Counseling:  I discussed with the patient the risks of Solaraze including but not limited to erythema, scaling, itching, weeping, crusting, and pain. Tremfya Counseling: I discussed with the patient the risks of guselkumab including but not limited to immunosuppression, serious infections, worsening of inflammatory bowel disease and drug reactions.  The patient understands that monitoring is required including a PPD at baseline and must alert us or the primary physician if symptoms of infection or other concerning signs are noted. Dupixent Pregnancy And Lactation Text: This medication likely crosses the placenta but the risk for the fetus is uncertain. This medication is excreted in breast milk. Elidel Counseling: Patient may experience a mild burning sensation during topical application. Elidel is not approved in children less than 2 years of age. There have been case reports of hematologic and skin malignancies in patients using topical calcineurin inhibitors although causality is questionable. Enbrel Counseling:  I discussed with the patient the risks of etanercept including but not limited to myelosuppression, immunosuppression, autoimmune hepatitis, demyelinating diseases, lymphoma, and infections.  The patient understands that monitoring is required including a PPD at baseline and must alert us or the primary physician if symptoms of infection or other concerning signs are noted. Hydroxychloroquine Counseling:  I discussed with the patient that a baseline ophthalmologic exam is needed at the start of therapy and every year thereafter while on therapy. A CBC may also be warranted for monitoring.  The side effects of this medication were discussed with the patient, including but not limited to agranulocytosis, aplastic anemia, seizures, rashes, retinopathy, and liver toxicity. Patient instructed to call the office should any adverse effect occur.  The patient verbalized understanding of the proper use and possible adverse effects of Plaquenil.  All the patient's questions and concerns were addressed. Sski Pregnancy And Lactation Text: This medication is Pregnancy Category D and isn't considered safe during pregnancy. It is excreted in breast milk. Quinolones Pregnancy And Lactation Text: This medication is Pregnancy Category C and it isn't know if it is safe during pregnancy. It is also excreted in breast milk. Topical Retinoid counseling:  Patient advised to apply a pea-sized amount only at bedtime and wait 30 minutes after washing their face before applying.  If too drying, patient may add a non-comedogenic moisturizer. The patient verbalized understanding of the proper use and possible adverse effects of retinoids.  All of the patient's questions and concerns were addressed. Metronidazole Pregnancy And Lactation Text: This medication is Pregnancy Category B and considered safe during pregnancy.  It is also excreted in breast milk. Opioid Pregnancy And Lactation Text: These medications can lead to premature delivery and should be avoided during pregnancy. These medications are also present in breast milk in small amounts. Rifampin Counseling: I discussed with the patient the risks of rifampin including but not limited to liver damage, kidney damage, red-orange body fluids, nausea/vomiting and severe allergy. Opioid Counseling: I discussed with the patient the potential side effects of opioids including but not limited to addiction, altered mental status, and depression. I stressed avoiding alcohol, benzodiazepines, muscle relaxants and sleep aids unless specifically okayed by a physician. The patient verbalized understanding of the proper use and possible adverse effects of opioids. All of the patient's questions and concerns were addressed. They were instructed to flush the remaining pills down the toilet if they did not need them for pain. Metronidazole Counseling:  I discussed with the patient the risks of metronidazole including but not limited to seizures, nausea/vomiting, a metallic taste in the mouth, nausea/vomiting and severe allergy. Doxepin Counseling:  Patient advised that the medication is sedating and not to drive a car after taking this medication. Patient informed of potential adverse effects including but not limited to dry mouth, urinary retention, and blurry vision.  The patient verbalized understanding of the proper use and possible adverse effects of doxepin.  All of the patient's questions and concerns were addressed. Thalidomide Counseling: I discussed with the patient the risks of thalidomide including but not limited to birth defects, anxiety, weakness, chest pain, dizziness, cough and severe allergy. Xeljanz Counseling: I discussed with the patient the risks of Xeljanz therapy including increased risk of infection, liver issues, headache, diarrhea, or cold symptoms. Live vaccines should be avoided. They were instructed to call if they have any problems. Acitretin Pregnancy And Lactation Text: This medication is Pregnancy Category X and should not be given to women who are pregnant or may become pregnant in the future. This medication is excreted in breast milk. Eucrisa Pregnancy And Lactation Text: This medication has not been assigned a Pregnancy Risk Category but animal studies failed to show danger with the topical medication. It is unknown if the medication is excreted in breast milk. Acitretin Counseling:  I discussed with the patient the risks of acitretin including but not limited to hair loss, dry lips/skin/eyes, liver damage, hyperlipidemia, depression/suicidal ideation, photosensitivity.  Serious rare side effects can include but are not limited to pancreatitis, pseudotumor cerebri, bony changes, clot formation/stroke/heart attack.  Patient understands that alcohol is contraindicated since it can result in liver toxicity and significantly prolong the elimination of the drug by many years. Include Pregnancy/Lactation Warning?: No Rifampin Pregnancy And Lactation Text: This medication is Pregnancy Category C and it isn't know if it is safe during pregnancy. It is also excreted in breast milk and should not be used if you are breast feeding. Eucrisa Counseling: Patient may experience a mild burning sensation during topical application. Eucrisa is not approved in children less than 2 years of age. Hydroxychloroquine Pregnancy And Lactation Text: This medication has been shown to cause fetal harm but it isn't assigned a Pregnancy Risk Category. There are small amounts excreted in breast milk. Doxepin Pregnancy And Lactation Text: This medication is Pregnancy Category C and it isn't known if it is safe during pregnancy. It is also excreted in breast milk and breast feeding isn't recommended. Arava Counseling:  Patient counseled regarding adverse effects of Arava including but not limited to nausea, vomiting, abnormalities in liver function tests. Patients may develop mouth sores, rash, diarrhea, and abnormalities in blood counts. The patient understands that monitoring is required including LFTs and blood counts.  There is a rare possibility of scarring of the liver and lung problems that can occur when taking methotrexate. Persistent nausea, loss of appetite, pale stools, dark urine, cough, and shortness of breath should be reported immediately. Patient advised to discontinue Arava treatment and consult with a physician prior to attempting conception. The patient will have to undergo a treatment to eliminate Arava from the body prior to conception. Hydroxyzine Counseling: Patient advised that the medication is sedating and not to drive a car after taking this medication.  Patient informed of potential adverse effects including but not limited to dry mouth, urinary retention, and blurry vision.  The patient verbalized understanding of the proper use and possible adverse effects of hydroxyzine.  All of the patient's questions and concerns were addressed. Bexarotene Counseling:  I discussed with the patient the risks of bexarotene including but not limited to hair loss, dry lips/skin/eyes, liver abnormalities, hyperlipidemia, pancreatitis, depression/suicidal ideation, photosensitivity, drug rash/allergic reactions, hypothyroidism, anemia, leukopenia, infection, cataracts, and teratogenicity.  Patient understands that they will need regular blood tests to check lipid profile, liver function tests, white blood cell count, thyroid function tests and pregnancy test if applicable. Hydroquinone Counseling:  Patient advised that medication may result in skin irritation, lightening (hypopigmentation), dryness, and burning.  In the event of skin irritation, the patient was advised to reduce the amount of the drug applied or use it less frequently.  Rarely, spots that are treated with hydroquinone can become darker (pseudoochronosis).  Should this occur, patient instructed to stop medication and call the office. The patient verbalized understanding of the proper use and possible adverse effects of hydroquinone.  All of the patient's questions and concerns were addressed. Xelallyz Pregnancy And Lactation Text: This medication is Pregnancy Category D and is not considered safe during pregnancy.  The risk during breast feeding is also uncertain. Humira Counseling:  I discussed with the patient the risks of adalimumab including but not limited to myelosuppression, immunosuppression, autoimmune hepatitis, demyelinating diseases, lymphoma, and serious infections.  The patient understands that monitoring is required including a PPD at baseline and must alert us or the primary physician if symptoms of infection or other concerning signs are noted. Xolair Counseling:  Patient informed of potential adverse effects including but not limited to fever, muscle aches, rash and allergic reactions.  The patient verbalized understanding of the proper use and possible adverse effects of Xolair.  All of the patient's questions and concerns were addressed. Tetracycline Counseling: Patient counseled regarding possible photosensitivity and increased risk for sunburn.  Patient instructed to avoid sunlight, if possible.  When exposed to sunlight, patients should wear protective clothing, sunglasses, and sunscreen.  The patient was instructed to call the office immediately if the following severe adverse effects occur:  hearing changes, easy bruising/bleeding, severe headache, or vision changes.  The patient verbalized understanding of the proper use and possible adverse effects of tetracycline.  All of the patient's questions and concerns were addressed. Patient understands to avoid pregnancy while on therapy due to potential birth defects. Niacinamide Counseling: I recommended taking niacin or niacinamide, also know as vitamin B3, twice daily. Recent evidence suggests that taking vitamin B3 (500 mg twice daily) can reduce the risk of actinic keratoses and non-melanoma skin cancers. Side effects of vitamin B3 include flushing and headache. Tazorac Counseling:  Patient advised that medication is irritating and drying.  Patient may need to apply sparingly and wash off after an hour before eventually leaving it on overnight.  The patient verbalized understanding of the proper use and possible adverse effects of tazorac.  All of the patient's questions and concerns were addressed. Hydroxyzine Pregnancy And Lactation Text: This medication is not safe during pregnancy and should not be taken. It is also excreted in breast milk and breast feeding isn't recommended. Nsaids Counseling: NSAID Counseling: I discussed with the patient that NSAIDs should be taken with food. Prolonged use of NSAIDs can result in the development of stomach ulcers.  Patient advised to stop taking NSAIDs if abdominal pain occurs.  The patient verbalized understanding of the proper use and possible adverse effects of NSAIDs.  All of the patient's questions and concerns were addressed. Niacinamide Pregnancy And Lactation Text: These medications are considered safe during pregnancy. Valtrex Counseling: I discussed with the patient the risks of valacyclovir including but not limited to kidney damage, nausea, vomiting and severe allergy.  The patient understands that if the infection seems to be worsening or is not improving, they are to call. Xolair Pregnancy And Lactation Text: This medication is Pregnancy Category B and is considered safe during pregnancy. This medication is excreted in breast milk. Tazorac Pregnancy And Lactation Text: This medication is not safe during pregnancy. It is unknown if this medication is excreted in breast milk. Ilumya Counseling: I discussed with the patient the risks of tildrakizumab including but not limited to immunosuppression, malignancy, posterior leukoencephalopathy syndrome, and serious infections.  The patient understands that monitoring is required including a PPD at baseline and must alert us or the primary physician if symptoms of infection or other concerning signs are noted. Bexarotene Pregnancy And Lactation Text: This medication is Pregnancy Category X and should not be given to women who are pregnant or may become pregnant. This medication should not be used if you are breast feeding. Nsaids Pregnancy And Lactation Text: These medications are considered safe up to 30 weeks gestation. It is excreted in breast milk. Topical Clindamycin Pregnancy And Lactation Text: This medication is Pregnancy Category B and is considered safe during pregnancy. It is unknown if it is excreted in breast milk. Valtrex Pregnancy And Lactation Text: this medication is Pregnancy Category B and is considered safe during pregnancy. This medication is not directly found in breast milk but it's metabolite acyclovir is present. Albendazole Counseling:  I discussed with the patient the risks of albendazole including but not limited to cytopenia, kidney damage, nausea/vomiting and severe allergy.  The patient understands that this medication is being used in an off-label manner. Topical Clindamycin Counseling: Patient counseled that this medication may cause skin irritation or allergic reactions.  In the event of skin irritation, the patient was advised to reduce the amount of the drug applied or use it less frequently.   The patient verbalized understanding of the proper use and possible adverse effects of clindamycin.  All of the patient's questions and concerns were addressed. Clofazimine Counseling:  I discussed with the patient the risks of clofazimine including but not limited to skin and eye pigmentation, liver damage, nausea/vomiting, gastrointestinal bleeding and allergy. Isotretinoin Counseling: Patient should get monthly blood tests, not donate blood, not drive at night if vision affected, not share medication, and not undergo elective surgery for 6 months after tx completed. Side effects reviewed, pt to contact office should one occur. Imiquimod Counseling:  I discussed with the patient the risks of imiquimod including but not limited to erythema, scaling, itching, weeping, crusting, and pain.  Patient understands that the inflammatory response to imiquimod is variable from person to person and was educated regarded proper titration schedule.  If flu-like symptoms develop, patient knows to discontinue the medication and contact us. Odomzo Counseling- I discussed with the patient the risks of Odomzo including but not limited to nausea, vomiting, diarrhea, constipation, weight loss, changes in the sense of taste, decreased appetite, muscle spasms, and hair loss.  The patient verbalized understanding of the proper use and possible adverse effects of Odomzo.  All of the patient's questions and concerns were addressed. Infliximab Counseling:  I discussed with the patient the risks of infliximab including but not limited to myelosuppression, immunosuppression, autoimmune hepatitis, demyelinating diseases, lymphoma, and serious infections.  The patient understands that monitoring is required including a PPD at baseline and must alert us or the primary physician if symptoms of infection or other concerning signs are noted. Topical Sulfur Applications Counseling: Topical Sulfur Counseling: Patient counseled that this medication may cause skin irritation or allergic reactions.  In the event of skin irritation, the patient was advised to reduce the amount of the drug applied or use it less frequently.   The patient verbalized understanding of the proper use and possible adverse effects of topical sulfur application.  All of the patient's questions and concerns were addressed. High Dose Vitamin A Counseling: Side effects reviewed, pt to contact office should one occur. Azathioprine Counseling:  I discussed with the patient the risks of azathioprine including but not limited to myelosuppression, immunosuppression, hepatotoxicity, lymphoma, and infections.  The patient understands that monitoring is required including baseline LFTs, Creatinine, possible TPMP genotyping and weekly CBCs for the first month and then every 2 weeks thereafter.  The patient verbalized understanding of the proper use and possible adverse effects of azathioprine.  All of the patient's questions and concerns were addressed. Isotretinoin Pregnancy And Lactation Text: This medication is Pregnancy Category X and is considered extremely dangerous during pregnancy. It is unknown if it is excreted in breast milk. Albendazole Pregnancy And Lactation Text: This medication is Pregnancy Category C and it isn't known if it is safe during pregnancy. It is also excreted in breast milk. Azithromycin Counseling:  I discussed with the patient the risks of azithromycin including but not limited to GI upset, allergic reaction, drug rash, diarrhea, and yeast infections. Fluconazole Counseling:  Patient counseled regarding adverse effects of fluconazole including but not limited to headache, diarrhea, nausea, upset stomach, liver function test abnormalities, taste disturbance, and stomach pain.  There is a rare possibility of liver failure that can occur when taking fluconazole.  The patient understands that monitoring of LFTs and kidney function test may be required, especially at baseline. The patient verbalized understanding of the proper use and possible adverse effects of fluconazole.  All of the patient's questions and concerns were addressed. Ivermectin Counseling:  Patient instructed to take medication on an empty stomach with a full glass of water.  Patient informed of potential adverse effects including but not limited to nausea, diarrhea, dizziness, itching, and swelling of the extremities or lymph nodes.  The patient verbalized understanding of the proper use and possible adverse effects of ivermectin.  All of the patient's questions and concerns were addressed. Topical Sulfur Applications Pregnancy And Lactation Text: This medication is Pregnancy Category C and has an unknown safety profile during pregnancy. It is unknown if this topical medication is excreted in breast milk. Azithromycin Pregnancy And Lactation Text: This medication is considered safe during pregnancy and is also secreted in breast milk. High Dose Vitamin A Pregnancy And Lactation Text: High dose vitamin A therapy is contraindicated during pregnancy and breast feeding. Rituxan Counseling:  I discussed with the patient the risks of Rituxan infusions. Side effects can include infusion reactions, severe drug rashes including mucocutaneous reactions, reactivation of latent hepatitis and other infections and rarely progressive multifocal leukoencephalopathy.  All of the patient's questions and concerns were addressed. Colchicine Counseling:  Patient counseled regarding adverse effects including but not limited to stomach upset (nausea, vomiting, stomach pain, or diarrhea).  Patient instructed to limit alcohol consumption while taking this medication.  Colchicine may reduce blood counts especially with prolonged use.  The patient understands that monitoring of kidney function and blood counts may be required, especially at baseline. The patient verbalized understanding of the proper use and possible adverse effects of colchicine.  All of the patient's questions and concerns were addressed. Azathioprine Pregnancy And Lactation Text: This medication is Pregnancy Category D and isn't considered safe during pregnancy. It is unknown if this medication is excreted in breast milk. Minoxidil Counseling: Minoxidil is a topical medication which can increase blood flow where it is applied. It is uncertain how this medication increases hair growth. Side effects are uncommon and include stinging and allergic reactions. Griseofulvin Counseling:  I discussed with the patient the risks of griseofulvin including but not limited to photosensitivity, cytopenia, liver damage, nausea/vomiting and severe allergy.  The patient understands that this medication is best absorbed when taken with a fatty meal (e.g., ice cream or french fries). Wartpeel Counseling:  I discussed with the patient the risks of Wartpeel including but not limited to erythema, scaling, itching, weeping, crusting, and pain. Dapsone Counseling: I discussed with the patient the risks of dapsone including but not limited to hemolytic anemia, agranulocytosis, rashes, methemoglobinemia, kidney failure, peripheral neuropathy, headaches, GI upset, and liver toxicity.  Patients who start dapsone require monitoring including baseline LFTs and weekly CBCs for the first month, then every month thereafter.  The patient verbalized understanding of the proper use and possible adverse effects of dapsone.  All of the patient's questions and concerns were addressed. Rituxan Pregnancy And Lactation Text: This medication is Pregnancy Category C and it isn't know if it is safe during pregnancy. It is unknown if this medication is excreted in breast milk but similar antibodies are known to be excreted. Otezla Counseling: The side effects of Otezla were discussed with the patient, including but not limited to worsening or new depression, weight loss, diarrhea, nausea, upper respiratory tract infection, and headache. Patient instructed to call the office should any adverse effect occur.  The patient verbalized understanding of the proper use and possible adverse effects of Otezla.  All the patient's questions and concerns were addressed. Bactrim Counseling:  I discussed with the patient the risks of sulfa antibiotics including but not limited to GI upset, allergic reaction, drug rash, diarrhea, dizziness, photosensitivity, and yeast infections.  Rarely, more serious reactions can occur including but not limited to aplastic anemia, agranulocytosis, methemoglobinemia, blood dyscrasias, liver or kidney failure, lung infiltrates or desquamative/blistering drug rashes. Cellcept Counseling:  I discussed with the patient the risks of mycophenolate mofetil including but not limited to infection/immunosuppression, GI upset, hypokalemia, hypercholesterolemia, bone marrow suppression, lymphoproliferative disorders, malignancy, GI ulceration/bleed/perforation, colitis, interstitial lung disease, kidney failure, progressive multifocal leukoencephalopathy, and birth defects.  The patient understands that monitoring is required including a baseline creatinine and regular CBC testing. In addition, patient must alert us immediately if symptoms of infection or other concerning signs are noted. Dapsone Pregnancy And Lactation Text: This medication is Pregnancy Category C and is not considered safe during pregnancy or breast feeding. Griseofulvin Pregnancy And Lactation Text: This medication is Pregnancy Category X and is known to cause serious birth defects. It is unknown if this medication is excreted in breast milk but breast feeding should be avoided. Oxybutynin Counseling:  I discussed with the patient the risks of oxybutynin including but not limited to skin rash, drowsiness, dry mouth, difficulty urinating, and blurred vision. Otezla Pregnancy And Lactation Text: This medication is Pregnancy Category C and it isn't known if it is safe during pregnancy. It is unknown if it is excreted in breast milk. Bactrim Pregnancy And Lactation Text: This medication is Pregnancy Category D and is known to cause fetal risk.  It is also excreted in breast milk. Itraconazole Counseling:  I discussed with the patient the risks of itraconazole including but not limited to liver damage, nausea/vomiting, neuropathy, and severe allergy.  The patient understands that this medication is best absorbed when taken with acidic beverages such as non-diet cola or ginger ale.  The patient understands that monitoring is required including baseline LFTs and repeat LFTs at intervals.  The patient understands that they are to contact us or the primary physician if concerning signs are noted. Mirvaso Counseling: Mirvaso is a topical medication which can decrease superficial blood flow where applied. Side effects are uncommon and include stinging, redness and allergic reactions. Siliq Counseling:  I discussed with the patient the risks of Siliq including but not limited to new or worsening depression, suicidal thoughts and behavior, immunosuppression, malignancy, posterior leukoencephalopathy syndrome, and serious infections.  The patient understands that monitoring is required including a PPD at baseline and must alert us or the primary physician if symptoms of infection or other concerning signs are noted. There is also a special program designed to monitor depression which is required with Siliq. Benzoyl Peroxide Counseling: Patient counseled that medicine may cause skin irritation and bleach clothing.  In the event of skin irritation, the patient was advised to reduce the amount of the drug applied or use it less frequently.   The patient verbalized understanding of the proper use and possible adverse effects of benzoyl peroxide.  All of the patient's questions and concerns were addressed. Erivedge Counseling- I discussed with the patient the risks of Erivedge including but not limited to nausea, vomiting, diarrhea, constipation, weight loss, changes in the sense of taste, decreased appetite, muscle spasms, and hair loss.  The patient verbalized understanding of the proper use and possible adverse effects of Erivedge.  All of the patient's questions and concerns were addressed. Picato Counseling:  I discussed with the patient the risks of Picato including but not limited to erythema, scaling, itching, weeping, crusting, and pain. Cephalexin Pregnancy And Lactation Text: This medication is Pregnancy Category B and considered safe during pregnancy.  It is also excreted in breast milk but can be used safely for shorter doses. Cyclophosphamide Pregnancy And Lactation Text: This medication is Pregnancy Category D and it isn't considered safe during pregnancy. This medication is excreted in breast milk. Cephalexin Counseling: I counseled the patient regarding use of cephalexin as an antibiotic for prophylactic and/or therapeutic purposes. Cephalexin (commonly prescribed under brand name Keflex) is a cephalosporin antibiotic which is active against numerous classes of bacteria, including most skin bacteria. Side effects may include nausea, diarrhea, gastrointestinal upset, rash, hives, yeast infections, and in rare cases, hepatitis, kidney disease, seizures, fever, confusion, neurologic symptoms, and others. Patients with severe allergies to penicillin medications are cautioned that there is about a 10% incidence of cross-reactivity with cephalosporins. When possible, patients with penicillin allergies should use alternatives to cephalosporins for antibiotic therapy. Cyclophosphamide Counseling:  I discussed with the patient the risks of cyclophosphamide including but not limited to hair loss, hormonal abnormalities, decreased fertility, abdominal pain, diarrhea, nausea and vomiting, bone marrow suppression and infection. The patient understands that monitoring is required while taking this medication. Benzoyl Peroxide Pregnancy And Lactation Text: This medication is Pregnancy Category C. It is unknown if benzoyl peroxide is excreted in breast milk. Zyclara Counseling:  I discussed with the patient the risks of imiquimod including but not limited to erythema, scaling, itching, weeping, crusting, and pain.  Patient understands that the inflammatory response to imiquimod is variable from person to person and was educated regarded proper titration schedule.  If flu-like symptoms develop, patient knows to discontinue the medication and contact us.

## 2019-12-27 ENCOUNTER — HOSPITAL ENCOUNTER (EMERGENCY)
Facility: HOSPITAL | Age: 84
Discharge: HOME | End: 2019-12-27
Attending: EMERGENCY MEDICINE | Admitting: EMERGENCY MEDICINE

## 2019-12-27 VITALS
HEIGHT: 60 IN | WEIGHT: 130 LBS | TEMPERATURE: 99.1 F | HEART RATE: 77 BPM | BODY MASS INDEX: 25.52 KG/M2 | OXYGEN SATURATION: 97 % | DIASTOLIC BLOOD PRESSURE: 61 MMHG | SYSTOLIC BLOOD PRESSURE: 119 MMHG | RESPIRATION RATE: 16 BRPM

## 2019-12-27 DIAGNOSIS — E86.0 ACUTE DEHYDRATION: ICD-10-CM

## 2019-12-27 DIAGNOSIS — R19.7 DIARRHEA, UNSPECIFIED TYPE: Primary | ICD-10-CM

## 2019-12-27 LAB
ADV 40+41 DNA STL QL NAA+NON-PROBE: NOT DETECTED
ALBUMIN SERPL-MCNC: 3.8 G/DL (ref 3.5–5.2)
ALBUMIN/GLOB SERPL: 1 G/DL
ALP SERPL-CCNC: 90 U/L (ref 39–117)
ALT SERPL W P-5'-P-CCNC: 12 U/L (ref 1–33)
ANION GAP SERPL CALCULATED.3IONS-SCNC: 13.6 MMOL/L (ref 5–15)
AST SERPL-CCNC: 13 U/L (ref 1–32)
ASTRO TYP 1-8 RNA STL QL NAA+NON-PROBE: NOT DETECTED
BASOPHILS # BLD AUTO: 0.04 10*3/MM3 (ref 0–0.2)
BASOPHILS NFR BLD AUTO: 0.5 % (ref 0–1.5)
BILIRUB SERPL-MCNC: 0.3 MG/DL (ref 0.2–1.2)
BILIRUB UR QL STRIP: NEGATIVE
BUN BLD-MCNC: 15 MG/DL (ref 8–23)
BUN/CREAT SERPL: 11.8 (ref 7–25)
C CAYETANENSIS DNA STL QL NAA+NON-PROBE: NOT DETECTED
C DIFF TOX GENS STL QL NAA+PROBE: NEGATIVE
CALCIUM SPEC-SCNC: 9 MG/DL (ref 8.2–9.6)
CAMPY SP DNA.DIARRHEA STL QL NAA+PROBE: NOT DETECTED
CHLORIDE SERPL-SCNC: 103 MMOL/L (ref 98–107)
CLARITY UR: CLEAR
CO2 SERPL-SCNC: 21.4 MMOL/L (ref 22–29)
COLOR UR: YELLOW
CREAT BLD-MCNC: 1.27 MG/DL (ref 0.57–1)
CRYPTOSP STL CULT: NOT DETECTED
DEPRECATED RDW RBC AUTO: 39.7 FL (ref 37–54)
E COLI DNA SPEC QL NAA+PROBE: NOT DETECTED
E HISTOLYT AG STL-ACNC: NOT DETECTED
EAEC PAA PLAS AGGR+AATA ST NAA+NON-PRB: NOT DETECTED
EC STX1 + STX2 GENES STL NAA+PROBE: NOT DETECTED
EOSINOPHIL # BLD AUTO: 0.15 10*3/MM3 (ref 0–0.4)
EOSINOPHIL NFR BLD AUTO: 2 % (ref 0.3–6.2)
EPEC EAE GENE STL QL NAA+NON-PROBE: NOT DETECTED
ERYTHROCYTE [DISTWIDTH] IN BLOOD BY AUTOMATED COUNT: 12 % (ref 12.3–15.4)
ETEC LTA+ST1A+ST1B TOX ST NAA+NON-PROBE: NOT DETECTED
G LAMBLIA DNA SPEC QL NAA+PROBE: NOT DETECTED
GFR SERPL CREATININE-BSD FRML MDRD: 39 ML/MIN/1.73
GLOBULIN UR ELPH-MCNC: 3.7 GM/DL
GLUCOSE BLD-MCNC: 86 MG/DL (ref 65–99)
GLUCOSE UR STRIP-MCNC: NEGATIVE MG/DL
HCT VFR BLD AUTO: 32.2 % (ref 34–46.6)
HGB BLD-MCNC: 10.8 G/DL (ref 12–15.9)
HGB UR QL STRIP.AUTO: NEGATIVE
HOLD SPECIMEN: NORMAL
HOLD SPECIMEN: NORMAL
IMM GRANULOCYTES # BLD AUTO: 0.02 10*3/MM3 (ref 0–0.05)
IMM GRANULOCYTES NFR BLD AUTO: 0.3 % (ref 0–0.5)
KETONES UR QL STRIP: NEGATIVE
LEUKOCYTE ESTERASE UR QL STRIP.AUTO: NEGATIVE
LIPASE SERPL-CCNC: 52 U/L (ref 13–60)
LYMPHOCYTES # BLD AUTO: 1.74 10*3/MM3 (ref 0.7–3.1)
LYMPHOCYTES NFR BLD AUTO: 23 % (ref 19.6–45.3)
MCH RBC QN AUTO: 30.7 PG (ref 26.6–33)
MCHC RBC AUTO-ENTMCNC: 33.5 G/DL (ref 31.5–35.7)
MCV RBC AUTO: 91.5 FL (ref 79–97)
MONOCYTES # BLD AUTO: 0.93 10*3/MM3 (ref 0.1–0.9)
MONOCYTES NFR BLD AUTO: 12.3 % (ref 5–12)
NEUTROPHILS # BLD AUTO: 4.69 10*3/MM3 (ref 1.7–7)
NEUTROPHILS NFR BLD AUTO: 61.9 % (ref 42.7–76)
NITRITE UR QL STRIP: NEGATIVE
NOROVIRUS GI+II RNA STL QL NAA+NON-PROBE: NOT DETECTED
NRBC BLD AUTO-RTO: 0 /100 WBC (ref 0–0.2)
P SHIGELLOIDES DNA STL QL NAA+PROBE: NOT DETECTED
PH UR STRIP.AUTO: 5.5 [PH] (ref 5–8)
PLATELET # BLD AUTO: 280 10*3/MM3 (ref 140–450)
PMV BLD AUTO: 10 FL (ref 6–12)
POTASSIUM BLD-SCNC: 4.2 MMOL/L (ref 3.5–5.2)
PROT SERPL-MCNC: 7.5 G/DL (ref 6–8.5)
PROT UR QL STRIP: NEGATIVE
RBC # BLD AUTO: 3.52 10*6/MM3 (ref 3.77–5.28)
RV RNA STL NAA+PROBE: NOT DETECTED
SALMONELLA DNA SPEC QL NAA+PROBE: NOT DETECTED
SAPO I+II+IV+V RNA STL QL NAA+NON-PROBE: NOT DETECTED
SHIGELLA SP+EIEC IPAH STL QL NAA+PROBE: NOT DETECTED
SODIUM BLD-SCNC: 138 MMOL/L (ref 136–145)
SP GR UR STRIP: 1.01 (ref 1–1.03)
UROBILINOGEN UR QL STRIP: NORMAL
V CHOLERAE DNA SPEC QL NAA+PROBE: NOT DETECTED
VIBRIO DNA SPEC NAA+PROBE: NOT DETECTED
WBC NRBC COR # BLD: 7.57 10*3/MM3 (ref 3.4–10.8)
WHOLE BLOOD HOLD SPECIMEN: NORMAL
WHOLE BLOOD HOLD SPECIMEN: NORMAL
YERSINIA STL CULT: NOT DETECTED

## 2019-12-27 PROCEDURE — 0097U HC BIOFIRE FILMARRAY GI PANEL: CPT | Performed by: NURSE PRACTITIONER

## 2019-12-27 PROCEDURE — 80053 COMPREHEN METABOLIC PANEL: CPT

## 2019-12-27 PROCEDURE — 85025 COMPLETE CBC W/AUTO DIFF WBC: CPT

## 2019-12-27 PROCEDURE — 87493 C DIFF AMPLIFIED PROBE: CPT | Performed by: NURSE PRACTITIONER

## 2019-12-27 PROCEDURE — 83690 ASSAY OF LIPASE: CPT

## 2019-12-27 PROCEDURE — 96360 HYDRATION IV INFUSION INIT: CPT

## 2019-12-27 PROCEDURE — 99284 EMERGENCY DEPT VISIT MOD MDM: CPT

## 2019-12-27 PROCEDURE — 81003 URINALYSIS AUTO W/O SCOPE: CPT

## 2019-12-27 RX ORDER — SODIUM CHLORIDE 0.9 % (FLUSH) 0.9 %
10 SYRINGE (ML) INJECTION AS NEEDED
Status: DISCONTINUED | OUTPATIENT
Start: 2019-12-27 | End: 2019-12-27 | Stop reason: HOSPADM

## 2019-12-27 RX ORDER — SACCHAROMYCES BOULARDII 250 MG
250 CAPSULE ORAL 2 TIMES DAILY
Qty: 30 CAPSULE | Refills: 0 | Status: SHIPPED | OUTPATIENT
Start: 2019-12-27 | End: 2022-04-04 | Stop reason: HOSPADM

## 2019-12-27 RX ADMIN — SODIUM CHLORIDE 1000 ML: 9 INJECTION, SOLUTION INTRAVENOUS at 12:38

## 2020-01-06 ENCOUNTER — LAB (OUTPATIENT)
Dept: LAB | Facility: HOSPITAL | Age: 85
End: 2020-01-06

## 2020-01-06 ENCOUNTER — TRANSCRIBE ORDERS (OUTPATIENT)
Dept: ADMINISTRATIVE | Facility: HOSPITAL | Age: 85
End: 2020-01-06

## 2020-01-06 DIAGNOSIS — K52.9 CHRONIC DIARRHEA: ICD-10-CM

## 2020-01-06 DIAGNOSIS — K52.9 CHRONIC DIARRHEA: Primary | ICD-10-CM

## 2020-01-06 PROCEDURE — 83516 IMMUNOASSAY NONANTIBODY: CPT

## 2020-01-06 PROCEDURE — 36415 COLL VENOUS BLD VENIPUNCTURE: CPT

## 2020-01-06 PROCEDURE — 82784 ASSAY IGA/IGD/IGG/IGM EACH: CPT

## 2020-01-06 PROCEDURE — 86255 FLUORESCENT ANTIBODY SCREEN: CPT

## 2020-01-07 LAB
ENDOMYSIUM IGA SER QL: NEGATIVE
GLIADIN PEPTIDE IGA SER-ACNC: 6 UNITS (ref 0–19)
GLIADIN PEPTIDE IGG SER-ACNC: 4 UNITS (ref 0–19)
IGA SERPL-MCNC: 307 MG/DL (ref 64–422)
IGA SERPL-MCNC: 315 MG/DL (ref 64–422)
TTG IGA SER-ACNC: <2 U/ML (ref 0–3)
TTG IGA SER-ACNC: <2 U/ML (ref 0–3)
TTG IGG SER-ACNC: 8 U/ML (ref 0–5)

## 2020-01-22 ENCOUNTER — OFFICE VISIT (OUTPATIENT)
Dept: GASTROENTEROLOGY | Facility: CLINIC | Age: 85
End: 2020-01-22

## 2020-01-22 ENCOUNTER — TELEPHONE (OUTPATIENT)
Dept: GASTROENTEROLOGY | Facility: CLINIC | Age: 85
End: 2020-01-22

## 2020-01-22 VITALS
DIASTOLIC BLOOD PRESSURE: 72 MMHG | BODY MASS INDEX: 25.95 KG/M2 | WEIGHT: 132.2 LBS | SYSTOLIC BLOOD PRESSURE: 112 MMHG | HEIGHT: 60 IN | TEMPERATURE: 97.8 F

## 2020-01-22 DIAGNOSIS — R19.7 DIARRHEA, UNSPECIFIED TYPE: Primary | ICD-10-CM

## 2020-01-22 DIAGNOSIS — R63.4 WEIGHT LOSS: ICD-10-CM

## 2020-01-22 PROCEDURE — 99204 OFFICE O/P NEW MOD 45 MIN: CPT | Performed by: INTERNAL MEDICINE

## 2020-01-22 RX ORDER — CIMETIDINE 200 MG/1
200 TABLET, FILM COATED ORAL DAILY
Refills: 12 | COMMUNITY
Start: 2019-10-22 | End: 2022-04-04 | Stop reason: HOSPADM

## 2020-01-22 NOTE — TELEPHONE ENCOUNTER
Prometheus acid bile test ordered.  Form and instructions given to pt.  Pt and daughter verb understanding.

## 2020-01-22 NOTE — PROGRESS NOTES
Chief Complaint   Patient presents with   • Diarrhea        Shaina Mcknight is a  89 y.o. female here for an initial visit for diarrhea    HPI this 89-year-old white female patient of Dr. Fanta Shankar presents with a history of intermittent diarrhea for the past year.  Her initial event was attributed to stress while caring for her ailing , she had eventual resolution by another episode lasting 2 months that again resolved and the most recent onset in early December that has persisted to the present.  Her normal bowel pattern is once daily.  With her diarrhea she may have 4 or more stools per day.  She believes there is some contributing effect with lactose sugar and gluten.  She did have a celiac panel performed which was essentially negative except for a trace elevation in TTG IgG but tested weakly positive.  We talked about lactose intolerance and avoidance to see if this is truly a cause-and-effect relationship.  An ER visit indicates she had been tested with a GI panel by PCR with all negative findings.  She describes a weight loss of 10 pounds over the past month because of avoidance due to the onset of her diarrhea.  No weight changes prior to that.  No reported melena or bright red blood per rectum.  No complaints of abdominal pain associated with her diarrhea.  We discussed further work-up and she is reticent to undergo endoscopy at this time.  I offered a CT scan of her abdomen and pelvis as well as further lab testing to include a bile acid assay and if needed hydrogen breath test to rule out bacterial overgrowth.  I encouraged her to stop her probiotic for the present.    Past Medical History:   Diagnosis Date   • Acute pain of left shoulder due to trauma    • Aortic valve regurgitation     trace   • Aortic valve stenosis     mild   • SPENCER (dyspnea on exertion)    • Fall     going up the steps   • Heart murmur    • Mitral valve regurgitation     mild to moderate   • Pulmonary valve regurgitation      trace   • Tricuspid valve regurgitation     mild to moderate       Current Outpatient Medications   Medication Sig Dispense Refill   • aspirin 81 MG tablet Take 81 mg by mouth Daily.     • Calcium Carbonate (CALCIUM 600) 1500 (600 Ca) MG tablet Take 600 mg by mouth Daily.     • cetirizine (zyrTEC) 10 MG tablet Take 10 mg by mouth Daily.     • EQ CIMETIDINE 200 MG tablet Take 200 mg by mouth Daily.  12   • IRON PO Take 65 mg by mouth Daily.     • Misc Natural Products (OSTEO BI-FLEX ADV TRIPLE ST PO) Take 1 tablet by mouth Daily.     • MULTIPLE VITAMIN PO Take 1 tablet by mouth Daily.     • saccharomyces boulardii (FLORASTOR) 250 MG capsule Take 1 capsule by mouth 2 (Two) Times a Day. 30 capsule 0   • vitamin B-12 (CYANOCOBALAMIN) 1000 MCG tablet Take 1,000 mcg by mouth Daily.     • Vitamin D, Cholecalciferol, 1000 units capsule Take 1 capsule by mouth Daily.     • loperamide (IMODIUM) 2 MG capsule Take 1 capsule by mouth 4 (Four) Times a Day As Needed for Diarrhea.       No current facility-administered medications for this visit.        PRN Meds:.    Allergies   Allergen Reactions   • Molds & Smuts Shortness Of Breath   • Penicillins Hives   • Tessalon [Benzonatate] Delirium   • Oyster Shell        Social History     Socioeconomic History   • Marital status:      Spouse name: Not on file   • Number of children: Not on file   • Years of education: Not on file   • Highest education level: Not on file   Tobacco Use   • Smoking status: Never Smoker   • Smokeless tobacco: Never Used   • Tobacco comment: caffine use   Substance and Sexual Activity   • Alcohol use: Yes     Alcohol/week: 1.0 standard drinks     Types: 1 Glasses of wine per week     Comment: daily   • Drug use: No   • Sexual activity: Defer       Family History   Problem Relation Age of Onset   • Stroke Mother    • Cancer Mother    • Heart attack Father    • Heart disease Father    • Sudden death Father    • Heart attack Brother    • Heart  disease Brother    • Sudden death Brother    • Cancer Son        Review of Systems   Constitutional: Positive for unexpected weight change. Negative for activity change, appetite change and fatigue.   HENT: Negative for congestion, facial swelling, sore throat, trouble swallowing and voice change.    Eyes: Negative for photophobia and visual disturbance.   Respiratory: Negative for cough and choking.    Cardiovascular: Negative for chest pain.   Gastrointestinal: Positive for diarrhea. Negative for abdominal distention, abdominal pain, anal bleeding, blood in stool, constipation, nausea, rectal pain and vomiting.   Endocrine: Negative for polyphagia.   Musculoskeletal: Negative for arthralgias, gait problem and joint swelling.   Skin: Negative for color change, pallor and rash.   Allergic/Immunologic: Negative for food allergies.   Neurological: Negative for speech difficulty and headaches.   Hematological: Does not bruise/bleed easily.   Psychiatric/Behavioral: Negative for agitation, confusion and sleep disturbance.       Vitals:    01/22/20 1327   BP: 112/72   Temp: 97.8 °F (36.6 °C)       Physical Exam   Constitutional: She is oriented to person, place, and time. She appears well-developed and well-nourished. No distress.   HENT:   Head: Normocephalic.   Mouth/Throat: Oropharynx is clear and moist. No oropharyngeal exudate.   Eyes: Conjunctivae and EOM are normal. No scleral icterus.   Neck: Normal range of motion. No thyromegaly present.   Cardiovascular: Normal rate and regular rhythm.   No murmur heard.  Pulmonary/Chest: Breath sounds normal. No respiratory distress. She has no wheezes. She has no rales.   Abdominal: Soft. Bowel sounds are normal. She exhibits no distension and no mass. There is no hepatosplenomegaly. There is no tenderness.   Musculoskeletal: Normal range of motion. She exhibits no edema or tenderness.   Lymphadenopathy:     She has no cervical adenopathy.   Neurological: She is alert and  oriented to person, place, and time.   Skin: Skin is warm and dry. No rash noted. She is not diaphoretic. No erythema.   Psychiatric: She has a normal mood and affect. Her behavior is normal.   Vitals reviewed.      ASSESSMENT   #1 diarrhea: Chronic issue on an intermittent basis without defined etiology.  #2 weight loss: Presumed secondary to diminished intake      PLAN  CT scan of the abdomen and pelvis  Bile acid assay  Pending results may consider hydrogen breath testing to rule out bacterial overgrowth        ICD-10-CM ICD-9-CM   1. Diarrhea, unspecified type R19.7 787.91          Answers for HPI/ROS submitted by the patient on 1/20/2020   How long have you been having these symptoms?: Other

## 2020-01-28 ENCOUNTER — TELEPHONE (OUTPATIENT)
Dept: GASTROENTEROLOGY | Facility: CLINIC | Age: 85
End: 2020-01-28

## 2020-01-28 NOTE — TELEPHONE ENCOUNTER
Okay to call result, bile acid assay was essentially normal.  Awaiting CT scan results.  If this is negative then would consider hydrogen breath testing.

## 2020-01-30 NOTE — TELEPHONE ENCOUNTER
Call to pt.  Advise per Dr Torres that bile acid assay essentially normal.  Awaiting CT results.  Verb understanding.

## 2020-02-04 ENCOUNTER — HOSPITAL ENCOUNTER (OUTPATIENT)
Dept: CT IMAGING | Facility: HOSPITAL | Age: 85
Discharge: HOME | End: 2020-02-04
Admitting: INTERNAL MEDICINE

## 2020-02-04 DIAGNOSIS — R63.4 WEIGHT LOSS: ICD-10-CM

## 2020-02-04 DIAGNOSIS — R19.7 DIARRHEA, UNSPECIFIED TYPE: ICD-10-CM

## 2020-02-04 PROCEDURE — 25010000002 IOPAMIDOL 61 % SOLUTION: Performed by: INTERNAL MEDICINE

## 2020-02-04 PROCEDURE — 82565 ASSAY OF CREATININE: CPT

## 2020-02-04 PROCEDURE — 74177 CT ABD & PELVIS W/CONTRAST: CPT

## 2020-02-04 RX ADMIN — IOPAMIDOL 85 ML: 612 INJECTION, SOLUTION INTRAVENOUS at 17:53

## 2020-02-11 ENCOUNTER — TELEPHONE (OUTPATIENT)
Dept: GASTROENTEROLOGY | Facility: CLINIC | Age: 85
End: 2020-02-11

## 2020-02-11 LAB — CREAT BLDA-MCNC: 1.5 MG/DL (ref 0.6–1.3)

## 2020-02-11 NOTE — TELEPHONE ENCOUNTER
----- Message from Kaitlynn Michael sent at 2/11/2020 12:28 PM EST -----  Regarding: results  Contact: [phone]  Pt calling to talk to nurse about results.

## 2020-02-13 ENCOUNTER — TELEPHONE (OUTPATIENT)
Dept: GASTROENTEROLOGY | Facility: CLINIC | Age: 85
End: 2020-02-13

## 2020-02-13 NOTE — TELEPHONE ENCOUNTER
----- Message from Ar HARMON MD sent at 2/12/2020  5:40 PM EST -----  Regarding: Lab results  Please forward results to primary care tender, I tried but cannot get the name to pull up.  ----- Message -----  From: Lab, Background User  Sent: 2/11/2020   8:21 AM EST  To: Ar HARMON MD

## 2020-02-21 ENCOUNTER — TELEPHONE (OUTPATIENT)
Dept: GASTROENTEROLOGY | Facility: CLINIC | Age: 85
End: 2020-02-21

## 2020-02-21 NOTE — TELEPHONE ENCOUNTER
----- Message from Elena Moscoso sent at 2/21/2020 12:57 PM EST -----  Regarding: call back   Contact: [phone]  Call back

## 2020-02-21 NOTE — TELEPHONE ENCOUNTER
Called pt and spoke with her and her daughter and they advised that they had already spoken with Katheryn and did not have any problems or questions.

## 2020-02-27 ENCOUNTER — TELEPHONE (OUTPATIENT)
Dept: GASTROENTEROLOGY | Facility: CLINIC | Age: 85
End: 2020-02-27

## 2020-02-27 NOTE — TELEPHONE ENCOUNTER
Call to [facility] @ [phone] and spoke with Monica.  Has not  yet been received. Monica will f/u and notify this office of response.    Call to daughter, Kathleen.  States dropped off at [facility] on 2/20.  Has receipt.  Kathleen will contact [facility] and then [facility] re: receipt of test kit.

## 2020-02-27 NOTE — TELEPHONE ENCOUNTER
----- Message from Kaitlynn Michael sent at 2/27/2020  9:54 AM EST -----  Regarding: results  Contact: [phone]  Pt is calling about her breath test result.

## 2020-03-02 ENCOUNTER — TELEPHONE (OUTPATIENT)
Dept: GASTROENTEROLOGY | Facility: CLINIC | Age: 85
End: 2020-03-02

## 2020-03-02 NOTE — TELEPHONE ENCOUNTER
Call to pt.  Advise per Dr Torres note.  Verb understanding.  Requesting f/u appt to discuss further.    States since CT, has been having urinary urgency - voiding small amounts.  Advise to f/u this issue with DR Shankar.  Pt adamant that could not be UTI because developed symptoms after CT.  Declines to f/u with Dr Shankar.      Reiterates request to see Dr Torres re: sibo and above.  Appt scheduled for 3/5 @ 2pm.    Update to Dr Torres.

## 2020-03-02 NOTE — TELEPHONE ENCOUNTER
----- Message from Ar HARMON MD sent at 2/28/2020  4:39 PM EST -----  Regarding: Small intestinal bacterial overgrowth results  Okay to call results, findings consistent with SIBO.  Can offer trial of antibiotic such as Xifaxan twice daily for 10 days and have patient call once treatment complete.  Can follow-up in office to discuss this if she wishes.  ----- Message -----  From: [place] Incoming  Sent: 2/28/2020   2:28 PM EST  To: Ar HARMON MD

## 2020-03-05 ENCOUNTER — OFFICE VISIT (OUTPATIENT)
Dept: GASTROENTEROLOGY | Facility: CLINIC | Age: 85
End: 2020-03-05

## 2020-03-05 VITALS
DIASTOLIC BLOOD PRESSURE: 64 MMHG | TEMPERATURE: 98.4 F | BODY MASS INDEX: 25.64 KG/M2 | HEIGHT: 60 IN | SYSTOLIC BLOOD PRESSURE: 126 MMHG | WEIGHT: 130.6 LBS

## 2020-03-05 DIAGNOSIS — R19.7 DIARRHEA, UNSPECIFIED TYPE: Primary | ICD-10-CM

## 2020-03-05 PROCEDURE — 99214 OFFICE O/P EST MOD 30 MIN: CPT | Performed by: INTERNAL MEDICINE

## 2020-03-05 NOTE — PROGRESS NOTES
Chief Complaint   Patient presents with   • Follow-up   • Diarrhea        Shaina Mcknight is a  89 y.o. female here for a follow up visit for diarrhea    HPI this 89-year-old white female patient of Dr. Fanta Shankar returns in follow-up since her initial visit January 22 of this year.  She has had longstanding intermittent issues with diarrhea and did undergo evaluation including bile acid assay and hydrogen breath testing.  Her test for small intestinal bacterial overgrowth was positive.  She and her daughter had multiple questions regarding this issue and we talked about options for treatment.  While a low FODMAP diet may help with irritable bowel syndrome the data does not suggest it will change her diarrhea.  Nonetheless I encouraged her to consider this option to start and pending her response would then offer antibiotic treatment.  We talked about the use of rifaximin although the cost may be an issue.  Her daughter is going to research this to see what would be cost effective.  I asked that she call with a progress report in 1 month and when antibiotics have been initiated after a course of 2 weeks to call with an update.  She is amenable to this.    Past Medical History:   Diagnosis Date   • Acute pain of left shoulder due to trauma    • Anemia 1950   • Aortic valve regurgitation     trace   • Aortic valve stenosis     mild   • Rojas esophagus 2018   • Cancer (CMS/HCC) 1990    some skin cancers   • SPENCER (dyspnea on exertion)    • Esophageal varices (CMS/HCC) 2018   • Fall     going up the steps   • GERD (gastroesophageal reflux disease) 2018   • Heart murmur    • Mitral valve regurgitation     mild to moderate   • Pulmonary valve regurgitation     trace   • Tricuspid valve regurgitation     mild to moderate       Current Outpatient Medications   Medication Sig Dispense Refill   • aspirin 81 MG tablet Take 81 mg by mouth Daily.     • Calcium Carbonate (CALCIUM 600) 1500 (600 Ca) MG tablet Take 600 mg by  mouth Daily.     • cetirizine (zyrTEC) 10 MG tablet Take 10 mg by mouth Daily.     • EQ CIMETIDINE 200 MG tablet Take 200 mg by mouth Daily.  12   • IRON PO Take 65 mg by mouth Daily.     • Misc Natural Products (OSTEO BI-FLEX ADV TRIPLE ST PO) Take 1 tablet by mouth Daily.     • MULTIPLE VITAMIN PO Take 1 tablet by mouth Daily.     • vitamin B-12 (CYANOCOBALAMIN) 1000 MCG tablet Take 1,000 mcg by mouth Daily.     • Vitamin D, Cholecalciferol, 1000 units capsule Take 1 capsule by mouth Daily.     • saccharomyces boulardii (FLORASTOR) 250 MG capsule Take 1 capsule by mouth 2 (Two) Times a Day. 30 capsule 0     No current facility-administered medications for this visit.        PRN Meds:.    Allergies   Allergen Reactions   • Molds & Smuts Shortness Of Breath   • Penicillins Hives   • Tessalon [Benzonatate] Delirium   • Oyster Shell        Social History     Socioeconomic History   • Marital status:      Spouse name: Not on file   • Number of children: Not on file   • Years of education: Not on file   • Highest education level: Not on file   Tobacco Use   • Smoking status: Never Smoker   • Smokeless tobacco: Never Used   • Tobacco comment: caffine use   Substance and Sexual Activity   • Alcohol use: Yes     Alcohol/week: 5.0 standard drinks     Types: 5 Glasses of wine per week     Comment: daily   • Drug use: No   • Sexual activity: Never       Family History   Problem Relation Age of Onset   • Stroke Mother    • Cancer Mother    • Heart attack Father    • Heart disease Father    • Sudden death Father    • Heart attack Brother    • Heart disease Brother    • Sudden death Brother    • Cancer Son        Review of Systems   Constitutional: Negative for activity change, appetite change, fatigue and unexpected weight change.   HENT: Negative for congestion, facial swelling, sore throat, trouble swallowing and voice change.    Eyes: Negative for photophobia and visual disturbance.   Respiratory: Negative for cough  and choking.    Cardiovascular: Negative for chest pain.   Gastrointestinal: Negative for abdominal distention, abdominal pain, anal bleeding, blood in stool, constipation, diarrhea, nausea, rectal pain and vomiting.   Endocrine: Negative for polyphagia.   Musculoskeletal: Negative for arthralgias, gait problem and joint swelling.   Skin: Negative for color change, pallor and rash.   Allergic/Immunologic: Negative for food allergies.   Neurological: Negative for speech difficulty and headaches.   Hematological: Does not bruise/bleed easily.   Psychiatric/Behavioral: Negative for agitation, confusion and sleep disturbance.       Vitals:    03/05/20 1348   BP: 126/64   Temp: 98.4 °F (36.9 °C)       Physical Exam   Constitutional: She is oriented to person, place, and time. She appears well-developed and well-nourished.   HENT:   Head: Normocephalic.   Mouth/Throat: Oropharynx is clear and moist.   Eyes: Conjunctivae and EOM are normal.   Neck: Normal range of motion.   Cardiovascular: Normal rate and regular rhythm.   Pulmonary/Chest: Breath sounds normal.   Abdominal: Soft. Bowel sounds are normal.   Musculoskeletal: Normal range of motion.   Neurological: She is alert and oriented to person, place, and time.   Skin: Skin is warm and dry.   Psychiatric: She has a normal mood and affect. Her behavior is normal.       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      PLAN  Patient to adhere to a low FODMAP diet  She will call with a progress report in 1 month  Daughter to cost analyze possible treatment with rifaximin and will call with results to see if this is reasonable.  If so we will initiate a prescription so that she can have it on hand with the next significant bout of diarrhea.      ICD-10-CM ICD-9-CM   1. Diarrhea, unspecified type R19.7 787.91

## 2020-03-06 ENCOUNTER — TELEPHONE (OUTPATIENT)
Dept: GASTROENTEROLOGY | Facility: CLINIC | Age: 85
End: 2020-03-06

## 2020-03-06 NOTE — TELEPHONE ENCOUNTER
Jarred completed for xifaxan 550 mg 1 tab po tid, #42, R0.     Call to Kathleen with instructions.  Advise that PA will probably be required.  Notify this office if any issues.

## 2020-03-06 NOTE — TELEPHONE ENCOUNTER
----- Message from Any Kohli sent at 3/6/2020 10:25 AM EST -----  Regarding: medication question   Contact: [phone]  Pt daughter Kathleen is calling regarding Xifaxan.

## 2020-03-06 NOTE — TELEPHONE ENCOUNTER
Call to daughter, Kathleen (see hipaa).  States has checked into xifaxan cost as instructed with o/v of 3/5.  Has been advised by insurance that cannot specific cost until has been prescribed and PA completed.      States would like to have on hand if has episodes of diarrhea.  Understood would be xifaxan 550 mg 1 tab po tid #14 days, #42.      Message to DR Torres.

## 2020-03-10 ENCOUNTER — PRIOR AUTHORIZATION (OUTPATIENT)
Dept: GASTROENTEROLOGY | Facility: CLINIC | Age: 85
End: 2020-03-10

## 2021-03-02 DIAGNOSIS — Z23 IMMUNIZATION DUE: ICD-10-CM

## 2021-05-17 ENCOUNTER — TRANSCRIBE ORDERS (OUTPATIENT)
Dept: ADMINISTRATIVE | Facility: HOSPITAL | Age: 86
End: 2021-05-17

## 2021-05-17 DIAGNOSIS — I65.23 ASYMPTOMATIC BILATERAL CAROTID ARTERY STENOSIS: Primary | ICD-10-CM

## 2021-05-17 DIAGNOSIS — I35.0 AORTIC STENOSIS, MODERATE: ICD-10-CM

## 2021-06-28 ENCOUNTER — HOSPITAL ENCOUNTER (OUTPATIENT)
Dept: CARDIOLOGY | Facility: HOSPITAL | Age: 86
Discharge: HOME | End: 2021-06-28

## 2021-06-28 VITALS
BODY MASS INDEX: 25.52 KG/M2 | HEIGHT: 60 IN | HEART RATE: 74 BPM | SYSTOLIC BLOOD PRESSURE: 156 MMHG | DIASTOLIC BLOOD PRESSURE: 76 MMHG | WEIGHT: 130 LBS

## 2021-06-28 DIAGNOSIS — I65.23 ASYMPTOMATIC BILATERAL CAROTID ARTERY STENOSIS: ICD-10-CM

## 2021-06-28 DIAGNOSIS — I35.0 AORTIC STENOSIS, MODERATE: ICD-10-CM

## 2021-06-28 LAB
AORTIC ARCH: 2.1 CM
AORTIC DIMENSIONLESS INDEX: 0.3 (DI)
ASCENDING AORTA: 3.3 CM
BH CV ECHO MEAS - ACS: 0.9 CM
BH CV ECHO MEAS - AI DEC SLOPE: 286 CM/SEC^2
BH CV ECHO MEAS - AI MAX PG: 64 MMHG
BH CV ECHO MEAS - AI MAX VEL: 400 CM/SEC
BH CV ECHO MEAS - AI P1/2T: 409.6 MSEC
BH CV ECHO MEAS - AO MAX PG (FULL): 25.5 MMHG
BH CV ECHO MEAS - AO MAX PG: 28.5 MMHG
BH CV ECHO MEAS - AO MEAN PG (FULL): 16 MMHG
BH CV ECHO MEAS - AO MEAN PG: 18 MMHG
BH CV ECHO MEAS - AO ROOT AREA (BSA CORRECTED): 2.1
BH CV ECHO MEAS - AO ROOT AREA: 8 CM^2
BH CV ECHO MEAS - AO ROOT DIAM: 3.2 CM
BH CV ECHO MEAS - AO V2 MAX: 267 CM/SEC
BH CV ECHO MEAS - AO V2 MEAN: 203 CM/SEC
BH CV ECHO MEAS - AO V2 VTI: 67.4 CM
BH CV ECHO MEAS - ASC AORTA: 3.3 CM
BH CV ECHO MEAS - AVA(I,A): 0.86 CM^2
BH CV ECHO MEAS - AVA(I,D): 0.86 CM^2
BH CV ECHO MEAS - AVA(V,A): 0.93 CM^2
BH CV ECHO MEAS - AVA(V,D): 0.93 CM^2
BH CV ECHO MEAS - BSA(HAYCOCK): 1.6 M^2
BH CV ECHO MEAS - BSA: 1.6 M^2
BH CV ECHO MEAS - BZI_BMI: 25.4 KILOGRAMS/M^2
BH CV ECHO MEAS - BZI_METRIC_HEIGHT: 152.4 CM
BH CV ECHO MEAS - BZI_METRIC_WEIGHT: 59 KG
BH CV ECHO MEAS - EDV(MOD-SP2): 55 ML
BH CV ECHO MEAS - EDV(MOD-SP4): 55 ML
BH CV ECHO MEAS - EDV(TEICH): 62 ML
BH CV ECHO MEAS - EF(CUBED): 77.8 %
BH CV ECHO MEAS - EF(MOD-BP): 58.6 %
BH CV ECHO MEAS - EF(MOD-SP2): 60 %
BH CV ECHO MEAS - EF(MOD-SP4): 56.4 %
BH CV ECHO MEAS - EF(TEICH): 70.8 %
BH CV ECHO MEAS - ESV(MOD-SP2): 22 ML
BH CV ECHO MEAS - ESV(MOD-SP4): 24 ML
BH CV ECHO MEAS - ESV(TEICH): 18.1 ML
BH CV ECHO MEAS - FS: 39.5 %
BH CV ECHO MEAS - IVS/LVPW: 1.2
BH CV ECHO MEAS - IVSD: 1.4 CM
BH CV ECHO MEAS - LAT PEAK E' VEL: 4.5 CM/SEC
BH CV ECHO MEAS - LV DIASTOLIC VOL/BSA (35-75): 35.4 ML/M^2
BH CV ECHO MEAS - LV MASS(C)D: 173.1 GRAMS
BH CV ECHO MEAS - LV MASS(C)DI: 111.3 GRAMS/M^2
BH CV ECHO MEAS - LV MAX PG: 3 MMHG
BH CV ECHO MEAS - LV MEAN PG: 2 MMHG
BH CV ECHO MEAS - LV SYSTOLIC VOL/BSA (12-30): 15.4 ML/M^2
BH CV ECHO MEAS - LV V1 MAX: 87.3 CM/SEC
BH CV ECHO MEAS - LV V1 MEAN: 59.4 CM/SEC
BH CV ECHO MEAS - LV V1 VTI: 20.4 CM
BH CV ECHO MEAS - LVIDD: 3.8 CM
BH CV ECHO MEAS - LVIDS: 2.3 CM
BH CV ECHO MEAS - LVLD AP2: 7.5 CM
BH CV ECHO MEAS - LVLD AP4: 7.7 CM
BH CV ECHO MEAS - LVLS AP2: 6.5 CM
BH CV ECHO MEAS - LVLS AP4: 6.3 CM
BH CV ECHO MEAS - LVOT AREA (M): 2.8 CM^2
BH CV ECHO MEAS - LVOT AREA: 2.8 CM^2
BH CV ECHO MEAS - LVOT DIAM: 1.9 CM
BH CV ECHO MEAS - LVPWD: 1.2 CM
BH CV ECHO MEAS - MED PEAK E' VEL: 4.9 CM/SEC
BH CV ECHO MEAS - MR MAX PG: 170 MMHG
BH CV ECHO MEAS - MR MAX VEL: 652 CM/SEC
BH CV ECHO MEAS - MV DEC SLOPE: 424 CM/SEC^2
BH CV ECHO MEAS - MV MAX PG: 4.8 MMHG
BH CV ECHO MEAS - MV MEAN PG: 2 MMHG
BH CV ECHO MEAS - MV P1/2T MAX VEL: 67.9 CM/SEC
BH CV ECHO MEAS - MV P1/2T: 46.9 MSEC
BH CV ECHO MEAS - MV V2 MAX: 109 CM/SEC
BH CV ECHO MEAS - MV V2 MEAN: 59.5 CM/SEC
BH CV ECHO MEAS - MV V2 VTI: 25.6 CM
BH CV ECHO MEAS - MVA P1/2T LCG: 3.2 CM^2
BH CV ECHO MEAS - MVA(P1/2T): 4.7 CM^2
BH CV ECHO MEAS - MVA(VTI): 2.3 CM^2
BH CV ECHO MEAS - PA ACC TIME: 0.17 SEC
BH CV ECHO MEAS - PA MAX PG (FULL): 1.2 MMHG
BH CV ECHO MEAS - PA MAX PG: 2.2 MMHG
BH CV ECHO MEAS - PA PR(ACCEL): 1.6 MMHG
BH CV ECHO MEAS - PA V2 MAX: 74.7 CM/SEC
BH CV ECHO MEAS - PULM A REVS DUR: 0.14 SEC
BH CV ECHO MEAS - PULM A REVS VEL: 24.4 CM/SEC
BH CV ECHO MEAS - PULM DIAS VEL: 41.6 CM/SEC
BH CV ECHO MEAS - PULM S/D: 0.95
BH CV ECHO MEAS - PULM SYS VEL: 39.4 CM/SEC
BH CV ECHO MEAS - PVA(V,A): 2.3 CM^2
BH CV ECHO MEAS - PVA(V,D): 2.3 CM^2
BH CV ECHO MEAS - QP/QS: 0.66
BH CV ECHO MEAS - RAP SYSTOLE: 3 MMHG
BH CV ECHO MEAS - RV MAX PG: 1 MMHG
BH CV ECHO MEAS - RV MEAN PG: 1 MMHG
BH CV ECHO MEAS - RV V1 MAX: 50.3 CM/SEC
BH CV ECHO MEAS - RV V1 MEAN: 35.2 CM/SEC
BH CV ECHO MEAS - RV V1 VTI: 11 CM
BH CV ECHO MEAS - RVOT AREA: 3.5 CM^2
BH CV ECHO MEAS - RVOT DIAM: 2.1 CM
BH CV ECHO MEAS - RVSP: 15.3 MMHG
BH CV ECHO MEAS - SI(AO): 348.7 ML/M^2
BH CV ECHO MEAS - SI(CUBED): 27.5 ML/M^2
BH CV ECHO MEAS - SI(LVOT): 37.2 ML/M^2
BH CV ECHO MEAS - SI(MOD-SP2): 21.2 ML/M^2
BH CV ECHO MEAS - SI(MOD-SP4): 19.9 ML/M^2
BH CV ECHO MEAS - SI(TEICH): 28.2 ML/M^2
BH CV ECHO MEAS - SUP REN AO DIAM: 1.2 CM
BH CV ECHO MEAS - SV(AO): 542.1 ML
BH CV ECHO MEAS - SV(CUBED): 42.7 ML
BH CV ECHO MEAS - SV(LVOT): 57.8 ML
BH CV ECHO MEAS - SV(MOD-SP2): 33 ML
BH CV ECHO MEAS - SV(MOD-SP4): 31 ML
BH CV ECHO MEAS - SV(RVOT): 38.1 ML
BH CV ECHO MEAS - SV(TEICH): 43.8 ML
BH CV ECHO MEAS - TAPSE (>1.6): 1.9 CM
BH CV ECHO MEAS - TR MAX VEL: 175 CM/SEC
BH CV XLRA - RV BASE: 2 CM
BH CV XLRA - RV LENGTH: 6 CM
BH CV XLRA - RV MID: 2.1 CM
BH CV XLRA - TDI S': 11.6 CM/SEC
BH CV XLRA MEAS LEFT DIST CCA EDV: 22.8 CM/SEC
BH CV XLRA MEAS LEFT DIST CCA PSV: 101 CM/SEC
BH CV XLRA MEAS LEFT DIST ICA EDV: -26.4 CM/SEC
BH CV XLRA MEAS LEFT DIST ICA PSV: -95.6 CM/SEC
BH CV XLRA MEAS LEFT ICA/CCA RATIO: 1.34
BH CV XLRA MEAS LEFT MID ICA EDV: -17.3 CM/SEC
BH CV XLRA MEAS LEFT MID ICA PSV: -88 CM/SEC
BH CV XLRA MEAS LEFT PROX CCA EDV: 10.6 CM/SEC
BH CV XLRA MEAS LEFT PROX CCA PSV: 52.6 CM/SEC
BH CV XLRA MEAS LEFT PROX ECA PSV: -73.9 CM/SEC
BH CV XLRA MEAS LEFT PROX ICA EDV: 22 CM/SEC
BH CV XLRA MEAS LEFT PROX ICA PSV: 135 CM/SEC
BH CV XLRA MEAS LEFT PROX SCLA PSV: 104 CM/SEC
BH CV XLRA MEAS LEFT VERTEBRAL A EDV: 8.3 CM/SEC
BH CV XLRA MEAS LEFT VERTEBRAL A PSV: 47.5 CM/SEC
BH CV XLRA MEAS RIGHT DIST CCA EDV: -17 CM/SEC
BH CV XLRA MEAS RIGHT DIST CCA PSV: -76.8 CM/SEC
BH CV XLRA MEAS RIGHT DIST ICA EDV: -28.1 CM/SEC
BH CV XLRA MEAS RIGHT DIST ICA PSV: -106 CM/SEC
BH CV XLRA MEAS RIGHT ICA/CCA RATIO: 1.55
BH CV XLRA MEAS RIGHT MID ICA EDV: -22.8 CM/SEC
BH CV XLRA MEAS RIGHT MID ICA PSV: -107 CM/SEC
BH CV XLRA MEAS RIGHT PROX CCA EDV: 17.6 CM/SEC
BH CV XLRA MEAS RIGHT PROX CCA PSV: 85 CM/SEC
BH CV XLRA MEAS RIGHT PROX ECA EDV: -9.4 CM/SEC
BH CV XLRA MEAS RIGHT PROX ECA PSV: -70.9 CM/SEC
BH CV XLRA MEAS RIGHT PROX ICA EDV: 32.2 CM/SEC
BH CV XLRA MEAS RIGHT PROX ICA PSV: 119 CM/SEC
BH CV XLRA MEAS RIGHT PROX SCLA PSV: 85 CM/SEC
BH CV XLRA MEAS RIGHT VERTEBRAL A EDV: -14.1 CM/SEC
BH CV XLRA MEAS RIGHT VERTEBRAL A PSV: -84.4 CM/SEC
LEFT ARM BP: NORMAL MMHG
LEFT ATRIUM VOLUME INDEX: 27 ML/M2
LV EF 2D ECHO EST: 59 %
RIGHT ARM BP: NORMAL MMHG
SINUS: 3 CM
STJ: 2.4 CM

## 2021-06-28 PROCEDURE — 93880 EXTRACRANIAL BILAT STUDY: CPT

## 2021-06-28 PROCEDURE — 93306 TTE W/DOPPLER COMPLETE: CPT | Performed by: INTERNAL MEDICINE

## 2021-06-28 PROCEDURE — 93306 TTE W/DOPPLER COMPLETE: CPT

## 2021-08-23 ENCOUNTER — LAB (OUTPATIENT)
Dept: LAB | Facility: HOSPITAL | Age: 86
End: 2021-08-23

## 2021-08-23 ENCOUNTER — TRANSCRIBE ORDERS (OUTPATIENT)
Dept: ADMINISTRATIVE | Facility: HOSPITAL | Age: 86
End: 2021-08-23

## 2021-08-23 DIAGNOSIS — R82.81 PUS IN URINE: Primary | ICD-10-CM

## 2021-08-25 ENCOUNTER — LAB (OUTPATIENT)
Dept: LAB | Facility: HOSPITAL | Age: 86
End: 2021-08-25

## 2021-08-25 DIAGNOSIS — R82.81 PUS IN URINE: Primary | ICD-10-CM

## 2021-08-25 LAB
BACTERIA UR QL AUTO: ABNORMAL /HPF
BILIRUB UR QL STRIP: NEGATIVE
CLARITY UR: CLEAR
COLOR UR: YELLOW
GLUCOSE UR STRIP-MCNC: NEGATIVE MG/DL
HGB UR QL STRIP.AUTO: NEGATIVE
HYALINE CASTS UR QL AUTO: ABNORMAL /LPF
KETONES UR QL STRIP: NEGATIVE
LEUKOCYTE ESTERASE UR QL STRIP.AUTO: ABNORMAL
NITRITE UR QL STRIP: NEGATIVE
PH UR STRIP.AUTO: 6 [PH] (ref 5–8)
PROT UR QL STRIP: NEGATIVE
RBC # UR: ABNORMAL /HPF
REF LAB TEST METHOD: ABNORMAL
SP GR UR STRIP: 1.01 (ref 1–1.03)
SQUAMOUS #/AREA URNS HPF: ABNORMAL /HPF
UROBILINOGEN UR QL STRIP: ABNORMAL
WBC UR QL AUTO: ABNORMAL /HPF

## 2021-08-25 PROCEDURE — 81001 URINALYSIS AUTO W/SCOPE: CPT

## 2021-08-25 PROCEDURE — 87086 URINE CULTURE/COLONY COUNT: CPT | Performed by: INTERNAL MEDICINE

## 2021-08-26 LAB — BACTERIA SPEC AEROBE CULT: NORMAL

## 2021-10-01 ENCOUNTER — HOSPITAL ENCOUNTER (EMERGENCY)
Facility: HOSPITAL | Age: 86
Discharge: HOME | End: 2021-10-01
Attending: EMERGENCY MEDICINE | Admitting: EMERGENCY MEDICINE

## 2021-10-01 ENCOUNTER — APPOINTMENT (OUTPATIENT)
Dept: GENERAL RADIOLOGY | Facility: HOSPITAL | Age: 86
End: 2021-10-01

## 2021-10-01 VITALS
DIASTOLIC BLOOD PRESSURE: 75 MMHG | RESPIRATION RATE: 18 BRPM | TEMPERATURE: 96.9 F | HEIGHT: 60 IN | BODY MASS INDEX: 25.39 KG/M2 | OXYGEN SATURATION: 94 % | SYSTOLIC BLOOD PRESSURE: 121 MMHG | HEART RATE: 87 BPM

## 2021-10-01 DIAGNOSIS — D72.829 LEUKOCYTOSIS, UNSPECIFIED TYPE: ICD-10-CM

## 2021-10-01 DIAGNOSIS — B34.8 RHINOVIRUS INFECTION: Primary | ICD-10-CM

## 2021-10-01 LAB
ALBUMIN SERPL-MCNC: 4 G/DL (ref 3.5–5.2)
ALBUMIN/GLOB SERPL: 1.3 G/DL
ALP SERPL-CCNC: 96 U/L (ref 39–117)
ALT SERPL W P-5'-P-CCNC: 17 U/L (ref 1–33)
ANION GAP SERPL CALCULATED.3IONS-SCNC: 7.7 MMOL/L (ref 5–15)
AST SERPL-CCNC: 19 U/L (ref 1–32)
B PARAPERT DNA SPEC QL NAA+PROBE: NOT DETECTED
B PERT DNA SPEC QL NAA+PROBE: NOT DETECTED
BASOPHILS # BLD AUTO: 0.05 10*3/MM3 (ref 0–0.2)
BASOPHILS NFR BLD AUTO: 0.3 % (ref 0–1.5)
BILIRUB SERPL-MCNC: 0.5 MG/DL (ref 0–1.2)
BUN SERPL-MCNC: 23 MG/DL (ref 8–23)
BUN/CREAT SERPL: 16.4 (ref 7–25)
C PNEUM DNA NPH QL NAA+NON-PROBE: NOT DETECTED
CALCIUM SPEC-SCNC: 8.8 MG/DL (ref 8.2–9.6)
CHLORIDE SERPL-SCNC: 98 MMOL/L (ref 98–107)
CO2 SERPL-SCNC: 25.3 MMOL/L (ref 22–29)
CREAT SERPL-MCNC: 1.4 MG/DL (ref 0.57–1)
DEPRECATED RDW RBC AUTO: 42.5 FL (ref 37–54)
EOSINOPHIL # BLD AUTO: 0.18 10*3/MM3 (ref 0–0.4)
EOSINOPHIL NFR BLD AUTO: 1.2 % (ref 0.3–6.2)
ERYTHROCYTE [DISTWIDTH] IN BLOOD BY AUTOMATED COUNT: 12.4 % (ref 12.3–15.4)
FLUAV SUBTYP SPEC NAA+PROBE: NOT DETECTED
FLUBV RNA ISLT QL NAA+PROBE: NOT DETECTED
GFR SERPL CREATININE-BSD FRML MDRD: 35 ML/MIN/1.73
GLOBULIN UR ELPH-MCNC: 3.1 GM/DL
GLUCOSE SERPL-MCNC: 128 MG/DL (ref 65–99)
HADV DNA SPEC NAA+PROBE: NOT DETECTED
HCOV 229E RNA SPEC QL NAA+PROBE: NOT DETECTED
HCOV HKU1 RNA SPEC QL NAA+PROBE: NOT DETECTED
HCOV NL63 RNA SPEC QL NAA+PROBE: NOT DETECTED
HCOV OC43 RNA SPEC QL NAA+PROBE: NOT DETECTED
HCT VFR BLD AUTO: 33.7 % (ref 34–46.6)
HGB BLD-MCNC: 11.3 G/DL (ref 12–15.9)
HMPV RNA NPH QL NAA+NON-PROBE: NOT DETECTED
HOLD SPECIMEN: NORMAL
HOLD SPECIMEN: NORMAL
HPIV1 RNA SPEC QL NAA+PROBE: NOT DETECTED
HPIV2 RNA SPEC QL NAA+PROBE: NOT DETECTED
HPIV3 RNA NPH QL NAA+PROBE: NOT DETECTED
HPIV4 P GENE NPH QL NAA+PROBE: NOT DETECTED
IMM GRANULOCYTES # BLD AUTO: 0.05 10*3/MM3 (ref 0–0.05)
IMM GRANULOCYTES NFR BLD AUTO: 0.3 % (ref 0–0.5)
LYMPHOCYTES # BLD AUTO: 1.56 10*3/MM3 (ref 0.7–3.1)
LYMPHOCYTES NFR BLD AUTO: 10.3 % (ref 19.6–45.3)
M PNEUMO IGG SER IA-ACNC: NOT DETECTED
MCH RBC QN AUTO: 31.1 PG (ref 26.6–33)
MCHC RBC AUTO-ENTMCNC: 33.5 G/DL (ref 31.5–35.7)
MCV RBC AUTO: 92.8 FL (ref 79–97)
MONOCYTES # BLD AUTO: 1.02 10*3/MM3 (ref 0.1–0.9)
MONOCYTES NFR BLD AUTO: 6.7 % (ref 5–12)
NEUTROPHILS NFR BLD AUTO: 12.28 10*3/MM3 (ref 1.7–7)
NEUTROPHILS NFR BLD AUTO: 81.2 % (ref 42.7–76)
NRBC BLD AUTO-RTO: 0 /100 WBC (ref 0–0.2)
NT-PROBNP SERPL-MCNC: 644.6 PG/ML (ref 0–1800)
PLATELET # BLD AUTO: 210 10*3/MM3 (ref 140–450)
PMV BLD AUTO: 10.3 FL (ref 6–12)
POTASSIUM SERPL-SCNC: 4.7 MMOL/L (ref 3.5–5.2)
PROT SERPL-MCNC: 7.1 G/DL (ref 6–8.5)
QT INTERVAL: 333 MS
RBC # BLD AUTO: 3.63 10*6/MM3 (ref 3.77–5.28)
RHINOVIRUS RNA SPEC NAA+PROBE: DETECTED
RSV RNA NPH QL NAA+NON-PROBE: NOT DETECTED
SARS-COV-2 RNA NPH QL NAA+NON-PROBE: NOT DETECTED
SODIUM SERPL-SCNC: 131 MMOL/L (ref 136–145)
TROPONIN T SERPL-MCNC: <0.01 NG/ML (ref 0–0.03)
WBC # BLD AUTO: 15.14 10*3/MM3 (ref 3.4–10.8)
WHOLE BLOOD HOLD SPECIMEN: NORMAL
WHOLE BLOOD HOLD SPECIMEN: NORMAL

## 2021-10-01 PROCEDURE — 99283 EMERGENCY DEPT VISIT LOW MDM: CPT

## 2021-10-01 PROCEDURE — 85025 COMPLETE CBC W/AUTO DIFF WBC: CPT

## 2021-10-01 PROCEDURE — 83880 ASSAY OF NATRIURETIC PEPTIDE: CPT

## 2021-10-01 PROCEDURE — 84484 ASSAY OF TROPONIN QUANT: CPT

## 2021-10-01 PROCEDURE — 93005 ELECTROCARDIOGRAM TRACING: CPT

## 2021-10-01 PROCEDURE — 93010 ELECTROCARDIOGRAM REPORT: CPT | Performed by: INTERNAL MEDICINE

## 2021-10-01 PROCEDURE — 80053 COMPREHEN METABOLIC PANEL: CPT

## 2021-10-01 PROCEDURE — 93005 ELECTROCARDIOGRAM TRACING: CPT | Performed by: EMERGENCY MEDICINE

## 2021-10-01 PROCEDURE — 0202U NFCT DS 22 TRGT SARS-COV-2: CPT | Performed by: EMERGENCY MEDICINE

## 2021-10-01 RX ORDER — DOXYCYCLINE 100 MG/1
100 CAPSULE ORAL 2 TIMES DAILY
Qty: 14 CAPSULE | Refills: 0 | Status: SHIPPED | OUTPATIENT
Start: 2021-10-01 | End: 2022-04-04 | Stop reason: HOSPADM

## 2021-10-01 RX ORDER — SODIUM CHLORIDE 0.9 % (FLUSH) 0.9 %
10 SYRINGE (ML) INJECTION AS NEEDED
Status: DISCONTINUED | OUTPATIENT
Start: 2021-10-01 | End: 2021-10-01 | Stop reason: HOSPADM

## 2021-10-01 NOTE — ED TRIAGE NOTES
Pt reports SOA, cough and nasal congestion x 2 days. Pt went to [facility] yesterday had negative flu and covid, went back today had CXR told has pneumonia and come to ER. Pt has had both covid vaccines, no known covid exposure.    Pt arrives in triage with mask on. Triage staff wearing N95 masks and goggles.

## 2021-10-01 NOTE — ED PROVIDER NOTES
" EMERGENCY DEPARTMENT ENCOUNTER    Room Number:  37/37  Date of encounter:  10/1/2021  PCP: Fanta Shankar MD  Historian: Patient      HPI:  Chief Complaint: URI and abnormal chest x-ray  A complete HPI/ROS/PMH/PSH/SH/FH are unobtainable due to: Poor historian    Context: Shaina Mcknight is a 89 y.o. female who presents to the ED c/o symptoms of upper respiratory infection for the last 2 to 3 days which include subjective fevers and chills, runny nose, fatigue and myalgias, nonproductive cough.  Patient was seen at the [facility] today and tested negative for influenza and Covid.  Chest x-ray was obtained, and reportedly she was told to come to the emergency department because it showed \"pneumonia\".    I am not able to visualize these images, but the interpretation is available in epic and it suggests some mild patchy airspace opacities which could represent pneumonia.    Patient has no significant shortness of breath, no chest pain, she is afebrile, and in no distress.  Were it not for the x-ray interpretation, the patient said she would not have come to the emergency room based on her symptoms.      PAST MEDICAL HISTORY  Active Ambulatory Problems     Diagnosis Date Noted   • TB (tuberculosis) 09/01/2017   • Dyspnea on exertion 09/01/2017   • Nonrheumatic aortic valve stenosis 09/01/2017   • Community acquired pneumonia of right lower lobe of lung 11/04/2018   • Leukocytosis 11/04/2018   • Acute respiratory failure with hypoxia (HCC) 11/04/2018   • Bacterial pneumonia 11/04/2018   • Sepsis ([facility]) 11/04/2018     Resolved Ambulatory Problems     Diagnosis Date Noted   • No Resolved Ambulatory Problems     Past Medical History:   Diagnosis Date   • Acute pain of left shoulder due to trauma    • Anemia 1950   • Aortic valve regurgitation    • Aortic valve stenosis    • Rojas esophagus 2018   • Cancer (CMS/HCC) 1990   • SPENCER (dyspnea on exertion)    • Esophageal varices (CMS/HCC) 2018   • Fall    • GERD " (gastroesophageal reflux disease) 2018   • Heart murmur    • Mitral valve regurgitation    • Pulmonary valve regurgitation    • Tricuspid valve regurgitation          PAST SURGICAL HISTORY  Past Surgical History:   Procedure Laterality Date   • CATARACT EXTRACTION Bilateral    • HYSTERECTOMY           FAMILY HISTORY  Family History   Problem Relation Age of Onset   • Stroke Mother    • Cancer Mother    • Heart attack Father    • Heart disease Father    • Sudden death Father    • Heart attack Brother    • Heart disease Brother    • Sudden death Brother    • Cancer Son          SOCIAL HISTORY  Social History     Socioeconomic History   • Marital status:      Spouse name: Not on file   • Number of children: Not on file   • Years of education: Not on file   • Highest education level: Not on file   Tobacco Use   • Smoking status: Never Smoker   • Smokeless tobacco: Never Used   • Tobacco comment: caffine use   Substance and Sexual Activity   • Alcohol use: Yes     Alcohol/week: 5.0 standard drinks     Types: 5 Glasses of wine per week     Comment: daily   • Drug use: No   • Sexual activity: Never         ALLERGIES  Molds & smuts, Penicillins, Tessalon [benzonatate], and Oyster shell        REVIEW OF SYSTEMS  Review of Systems     All systems reviewed and negative except for those discussed in HPI.       PHYSICAL EXAM    I have reviewed the triage vital signs and nursing notes.    ED Triage Vitals   Temp Heart Rate Resp BP SpO2   10/01/21 1309 10/01/21 1309 10/01/21 1309 10/01/21 1310 10/01/21 1309   96.9 °F (36.1 °C) 106 18 121/75 95 %      Temp src Heart Rate Source Patient Position BP Location FiO2 (%)   -- -- -- -- --              Physical Exam  GENERAL: not distressed, awake and alert nontoxic-appearing  HENT: nares patent, clear rhinorrhea.  Posterior oropharynx is clear with no swelling, exudate or erythema  EYES: no scleral icterus  CV: regular rhythm, regular rate  RESPIRATORY: normal effort, CTA  bilaterally with no respiratory distress accessory muscle use  ABDOMEN: soft  MUSCULOSKELETAL: no deformity  NEURO: alert, moves all extremities, follows commands  SKIN: warm, dry        LAB RESULTS  Recent Results (from the past 24 hour(s))   Comprehensive Metabolic Panel    Collection Time: 10/01/21  1:40 PM    Specimen: Blood   Result Value Ref Range    Glucose 128 (H) 65 - 99 mg/dL    BUN 23 8 - 23 mg/dL    Creatinine 1.40 (H) 0.57 - 1.00 mg/dL    Sodium 131 (L) 136 - 145 mmol/L    Potassium 4.7 3.5 - 5.2 mmol/L    Chloride 98 98 - 107 mmol/L    CO2 25.3 22.0 - 29.0 mmol/L    Calcium 8.8 8.2 - 9.6 mg/dL    Total Protein 7.1 6.0 - 8.5 g/dL    Albumin 4.00 3.50 - 5.20 g/dL    ALT (SGPT) 17 1 - 33 U/L    AST (SGOT) 19 1 - 32 U/L    Alkaline Phosphatase 96 39 - 117 U/L    Total Bilirubin 0.5 0.0 - 1.2 mg/dL    eGFR Non African Amer 35 (L) >60 mL/min/1.73    Globulin 3.1 gm/dL    A/G Ratio 1.3 g/dL    BUN/Creatinine Ratio 16.4 7.0 - 25.0    Anion Gap 7.7 5.0 - 15.0 mmol/L   BNP    Collection Time: 10/01/21  1:40 PM    Specimen: Blood   Result Value Ref Range    proBNP 644.6 0.0-1,800.0 pg/mL   Troponin    Collection Time: 10/01/21  1:40 PM    Specimen: Blood   Result Value Ref Range    Troponin T <0.010 0.000 - 0.030 ng/mL   Green Top (Gel)    Collection Time: 10/01/21  1:40 PM   Result Value Ref Range    Extra Tube Hold for add-ons.    Lavender Top    Collection Time: 10/01/21  1:40 PM   Result Value Ref Range    Extra Tube hold for add-on    Gold Top - SST    Collection Time: 10/01/21  1:40 PM   Result Value Ref Range    Extra Tube Hold for add-ons.    Light Blue Top    Collection Time: 10/01/21  1:40 PM   Result Value Ref Range    Extra Tube hold for add-on    CBC Auto Differential    Collection Time: 10/01/21  1:40 PM    Specimen: Blood   Result Value Ref Range    WBC 15.14 (H) 3.40 - 10.80 10*3/mm3    RBC 3.63 (L) 3.77 - 5.28 10*6/mm3    Hemoglobin 11.3 (L) 12.0 - 15.9 g/dL    Hematocrit 33.7 (L) 34.0 - 46.6 %     MCV 92.8 79.0 - 97.0 fL    MCH 31.1 26.6 - 33.0 pg    MCHC 33.5 31.5 - 35.7 g/dL    RDW 12.4 12.3 - 15.4 %    RDW-SD 42.5 37.0 - 54.0 fl    MPV 10.3 6.0 - 12.0 fL    Platelets 210 140 - 450 10*3/mm3    Neutrophil % 81.2 (H) 42.7 - 76.0 %    Lymphocyte % 10.3 (L) 19.6 - 45.3 %    Monocyte % 6.7 5.0 - 12.0 %    Eosinophil % 1.2 0.3 - 6.2 %    Basophil % 0.3 0.0 - 1.5 %    Immature Grans % 0.3 0.0 - 0.5 %    Neutrophils, Absolute 12.28 (H) 1.70 - 7.00 10*3/mm3    Lymphocytes, Absolute 1.56 0.70 - 3.10 10*3/mm3    Monocytes, Absolute 1.02 (H) 0.10 - 0.90 10*3/mm3    Eosinophils, Absolute 0.18 0.00 - 0.40 10*3/mm3    Basophils, Absolute 0.05 0.00 - 0.20 10*3/mm3    Immature Grans, Absolute 0.05 0.00 - 0.05 10*3/mm3    nRBC 0.0 0.0 - 0.2 /100 WBC   Respiratory Panel PCR w/COVID-19(SARS-CoV-2) JUSTEN/CELINA/CHEO/PAD/COR/MAD/LAMONTE In-House, NP Swab in UTM/VTM, 3-4 HR TAT - Swab, Nasopharynx    Collection Time: 10/01/21  1:53 PM    Specimen: Nasopharynx; Swab   Result Value Ref Range    ADENOVIRUS, PCR Not Detected Not Detected    Coronavirus 229E Not Detected Not Detected    Coronavirus HKU1 Not Detected Not Detected    Coronavirus NL63 Not Detected Not Detected    Coronavirus OC43 Not Detected Not Detected    COVID19 Not Detected Not Detected - Ref. Range    Human Metapneumovirus Not Detected Not Detected    Human Rhinovirus/Enterovirus Detected (A) Not Detected    Influenza A PCR Not Detected Not Detected    Influenza B PCR Not Detected Not Detected    Parainfluenza Virus 1 Not Detected Not Detected    Parainfluenza Virus 2 Not Detected Not Detected    Parainfluenza Virus 3 Not Detected Not Detected    Parainfluenza Virus 4 Not Detected Not Detected    RSV, PCR Not Detected Not Detected    Bordetella pertussis pcr Not Detected Not Detected    Bordetella parapertussis PCR Not Detected Not Detected    Chlamydophila pneumoniae PCR Not Detected Not Detected    Mycoplasma pneumo by PCR Not Detected Not Detected   ECG 12 Lead     Collection Time: 10/01/21  3:15 PM   Result Value Ref Range    QT Interval 333 ms       Ordered the above labs and independently reviewed the results.        RADIOLOGY  No Radiology Exams Resulted Within Past 24 Hours    I ordered the above noted radiological studies. Reviewed by me and discussed with radiologist.  See dictation for official radiology interpretation.      PROCEDURES    Procedures      MEDICATIONS GIVEN IN ER    Medications   sodium chloride 0.9 % flush 10 mL (has no administration in time range)         PROGRESS, DATA ANALYSIS, CONSULTS, AND MEDICAL DECISION MAKING    All labs have been independently reviewed by me.  All radiology studies have been reviewed by me and discussed with radiologist dictating the report.   EKG's independently viewed and interpreted by me.  Discussion below represents my analysis of pertinent findings related to patient's condition, differential diagnosis, treatment plan and final disposition.        ED Course as of Oct 01 1632   Fri Oct 01, 2021   1631 CBC shows a moderate leukocytosis    [DP]   1631 Chemistry shows some stable chronic kidney disease with compared to previous    [DP]   1631 Troponin and proBNP are normal    [DP]   1631 Respiratory viral panel was positive for human rhinovirus    [DP]   1631 EKG on arrival  Sinus rhythm in the 80s  Normal UT, QRS and QT  No acute ST segment changes are seen to suggest ischemia, and this is unchanged when compared to most recent previous dated December 10, 2019    [DP]   1631 Patient repeatedly refused to have her x-ray repeated here.  She said that she is concerned about her insurance not paying for it, despite the fact that I advised her that I could not visualize the images and only could go off the interpretation    [DP]   1632 She understands that this could lead to diagnostic error on my part, and could lead to unnecessary treatment    [DP]   1632 Based on the interpretation of that x-ray and the leukocytosis, I am  going to prescribe her some doxycycline.  While her symptoms certainly are more consistent with a viral upper respiratory infection, without any further information from imaging I feel compelled to treat for atypical pneumonia    [DP]      ED Course User Index  [DP] Kevin Scherer MD           PPE: The patient wore a surgical mask throughout the entire patient encounter. I wore an N95.    AS OF 16:32 EDT VITALS:    BP - 121/75  HR - 87  TEMP - 96.9 °F (36.1 °C)  O2 SATS - 94%        DIAGNOSIS  Final diagnoses:   Rhinovirus infection   Leukocytosis, unspecified type         DISPOSITION  Discharge           Kevin Scherer MD  10/01/21 0716

## 2021-10-14 ENCOUNTER — HOSPITAL ENCOUNTER (OUTPATIENT)
Dept: GENERAL RADIOLOGY | Facility: HOSPITAL | Age: 86
Discharge: HOME | End: 2021-10-14
Admitting: INTERNAL MEDICINE

## 2021-10-14 DIAGNOSIS — J18.9 UNRESOLVED PNEUMONIA: ICD-10-CM

## 2021-10-14 PROCEDURE — 71046 X-RAY EXAM CHEST 2 VIEWS: CPT

## 2021-11-17 ENCOUNTER — OFFICE VISIT (OUTPATIENT)
Dept: ORTHOPEDIC SURGERY | Facility: CLINIC | Age: 86
End: 2021-11-17

## 2021-11-17 VITALS — HEIGHT: 60 IN | BODY MASS INDEX: 25.13 KG/M2 | WEIGHT: 128 LBS | TEMPERATURE: 95.3 F

## 2021-11-17 DIAGNOSIS — M17.12 ARTHRITIS OF LEFT KNEE: Primary | ICD-10-CM

## 2021-11-17 DIAGNOSIS — S89.92XA INJURY OF LEFT KNEE, INITIAL ENCOUNTER: ICD-10-CM

## 2021-11-17 DIAGNOSIS — M25.562 LEFT KNEE PAIN, UNSPECIFIED CHRONICITY: ICD-10-CM

## 2021-11-17 PROCEDURE — 99213 OFFICE O/P EST LOW 20 MIN: CPT | Performed by: NURSE PRACTITIONER

## 2021-11-17 PROCEDURE — 73562 X-RAY EXAM OF KNEE 3: CPT | Performed by: NURSE PRACTITIONER

## 2021-11-17 NOTE — PROGRESS NOTES
Patient Name: Shaina Mcknight   YOB: 1927  Referring Primary Care Physician: Fanta Shankar MD  BMI: Body mass index is 25 kg/m².    Chief Complaint:    Chief Complaint   Patient presents with   • Left Knee - Pain        HPI:     Shaina Mcknight is a 89 y.o. female who presents today for evaluation of   Chief Complaint   Patient presents with   • Left Knee - Pain   . She reports that about 1 week ago she was getting into her car and twisted the left. She did not hear a pop. Did not have pain right away. Later that day began having a generalized aching in the entire left knee. It has not been particularly swollen but does feel stiff. She can bear weight but ambulates with a limp. She used a cane for a few days but is no longer needing it. It is improving but still hurts. There is no locking or catching. She hasn't taken any medications, liniments, etc for pain. She is doing some quad strengthening exercises which seem to help. She denies any prior surgeries on the knee but does report an injury skating several years ago. She did not have any treatments following that injury- just did some quad strengthening exercises and it improved.      Subjective   Medications:   Home Medications:  Current Outpatient Medications on File Prior to Visit   Medication Sig   • aspirin 81 MG tablet Take 81 mg by mouth Daily.   • Calcium Carbonate (CALCIUM 600) 1500 (600 Ca) MG tablet Take 600 mg by mouth Daily.   • cetirizine (zyrTEC) 10 MG tablet Take 10 mg by mouth Daily.   • IRON PO Take 65 mg by mouth Daily.   • Misc Natural Products (OSTEO BI-FLEX ADV TRIPLE ST PO) Take 1 tablet by mouth Daily.   • MULTIPLE VITAMIN PO Take 1 tablet by mouth Daily.   • Phenylephrine-DM-GG (TUSSIN CF PO) Take  by mouth.   • vitamin B-12 (CYANOCOBALAMIN) 1000 MCG tablet Take 1,000 mcg by mouth Daily.   • Vitamin D, Cholecalciferol, 1000 units capsule Take 1 capsule by mouth Daily.   • doxycycline (MONODOX) 100 MG capsule Take 1  capsule by mouth 2 (Two) Times a Day.   • EQ CIMETIDINE 200 MG tablet Take 200 mg by mouth Daily.   • riFAXIMin (XIFAXAN) 550 MG tablet Take 1 tablet by mouth Every 8 (Eight) Hours.   • saccharomyces boulardii (FLORASTOR) 250 MG capsule Take 1 capsule by mouth 2 (Two) Times a Day.     No current facility-administered medications on file prior to visit.     Current Medications:  Scheduled Meds:  Continuous Infusions:No current facility-administered medications for this visit.    PRN Meds:.    I have reviewed the patient's medical history in detail and updated the computerized patient record.  Review and summarization of old records includes:    Past Medical History:   Diagnosis Date   • Acute pain of left shoulder due to trauma    • Anemia 1950   • Aortic valve regurgitation     trace   • Aortic valve stenosis     mild   • Rojas esophagus 2018   • Cancer (HCC) 1990    some skin cancers   • SPENCER (dyspnea on exertion)    • Esophageal varices (HCC) 2018   • Fall     going up the steps   • GERD (gastroesophageal reflux disease) 2018   • Heart murmur    • Mitral valve regurgitation     mild to moderate   • Pulmonary valve regurgitation     trace   • Tricuspid valve regurgitation     mild to moderate        Past Surgical History:   Procedure Laterality Date   • CATARACT EXTRACTION Bilateral    • HYSTERECTOMY     • TONSILLECTOMY AND ADENOIDECTOMY          Social History     Occupational History   • Not on file   Tobacco Use   • Smoking status: Never Smoker   • Smokeless tobacco: Never Used   • Tobacco comment: caffine use   Substance and Sexual Activity   • Alcohol use: Yes     Alcohol/week: 5.0 standard drinks     Types: 5 Glasses of wine per week     Comment: daily   • Drug use: No   • Sexual activity: Never      Social History     Social History Narrative   • Not on file        Family History   Problem Relation Age of Onset   • Cancer Mother         bladder   • Heart attack Father    • Heart disease Father    • Sudden  "death Father    • Leukemia Father    • Heart attack Brother    • Heart disease Brother    • Sudden death Brother    • Cancer Son        ROS: 14 point review of systems was performed and all other systems were reviewed and are negative except for documented findings in HPI and today's encounter.     Allergies:   Allergies   Allergen Reactions   • Molds & Smuts Shortness Of Breath   • Penicillins Hives   • Tessalon [Benzonatate] Delirium   • Oyster Shell      Constitutional:  Denies fever, shaking or chills   Eyes:  Denies change in visual acuity   HENT:  Denies nasal congestion or sore throat   Respiratory:  Denies cough or shortness of breath   Cardiovascular:  Denies chest pain or severe LE edema   GI:  Denies abdominal pain, nausea, vomiting, bloody stools or diarrhea   Musculoskeletal:  Numbness, tingling, pain, or loss of motor function only as noted above in history of present illness.  : Denies painful urination or hematuria  Integument:  Denies rash, lesion or ulceration   Neurologic:  Denies headache or focal weakness  Endocrine:  Denies lymphadenopathy  Psych:  Denies confusion or change in mental status   Hem:  Denies active bleeding    OBJECTIVE:  Physical Exam: 89 y.o. female  Wt Readings from Last 3 Encounters:   11/17/21 58.1 kg (128 lb)   06/28/21 59 kg (130 lb)   03/05/20 59.2 kg (130 lb 9.6 oz)     Ht Readings from Last 1 Encounters:   11/17/21 152.4 cm (60\")     Body mass index is 25 kg/m².  Vitals:    11/17/21 0939   Temp: 95.3 °F (35.2 °C)     Vital signs reviewed.     General Appearance:    Alert, cooperative, in no acute distress                  Eyes: conjunctiva clear  ENT: external ears and nose atraumatic  CV: no peripheral edema  Resp: normal respiratory effort  Skin: no rashes or wounds; normal turgor  Psych: mood and affect appropriate  Lymph: no nodes appreciated  Neuro: gross sensation intact  Vascular:  Palpable peripheral pulse in noted extremity  Musculoskeletal Extremities: the " left knee is moderately swollen when compared to the right. There is no joint line tenderness to palpation. AROM 0-110. + patellofemoral crepetation. Norma is negative. Lachman negative. Posterior drawer negative. No laxity to varus or valgus stress. Calf is soft, non-tender. 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 Arthritis of left knee  M17.12 716.96   2. Left knee pain, unspecified chronicity  M25.562 719.46   3. Injury of left knee, initial encounter  S89.92XA 959.7           MDM/Plan:   The diagnosis(es), natural history, pathophysiology and treatment for diagnosis(es) were discussed. Opportunity given and questions answered.  Biomechanics of pertinent body areas discussed.  When appropriate, the use of ambulatory aids discussed.  EXERCISES:  Advice on benefits of, and types of regular/moderate exercise pertaining to orthopedic diagnosis(es).  MEDICATIONS:  The risks, benefits, warnings,side effects and alternatives of medications discussed.  Inflammation/pain control; with cold, heat, elevation and/or liniments discussed as appropriate  PT referral.   Discussed treatments for arthritis. Because her pain is improving she declines a cortisone injection today and will take tylenol and use ice, heat, liniments, etc for inflammation control. Referral for PT placed today and I encouraged her to continue quad strengthening exercises at home. Encouraged her to use a cane while hurting for balance as well as to reduce pressure on the knee. Encouraged moderate activity. If pain increases or is not improving she can return to the office for a cortisone injection. Will see her back as needed.     11/17/2021    Much of this encounter note is an electronic  transcription/translation of spoken language to printed text. The electronic translation of spoken language may permit erroneous, or at times, nonsensical words or phrases to be inadvertently transcribed; Although I have reviewed the note for such errors, some may still exist

## 2021-11-18 ENCOUNTER — PATIENT ROUNDING (BHMG ONLY) (OUTPATIENT)
Dept: ORTHOPEDIC SURGERY | Facility: CLINIC | Age: 86
End: 2021-11-18

## 2021-11-18 NOTE — PROGRESS NOTES
November 18, 2021    Hello, may I speak with Shaina Mcknight?    My name is Abiola Arvizu    I am  with [facility]  [address] [address] [phone].    Before we get started may I verify your date of birth? 10/4/1927    I am calling to officially welcome you to our practice and ask about your recent visit. Is this a good time to talk? Left message     Tell me about your visit with us. What things went well? left message      We're always looking for ways to make our patients' experiences even better. Do you have recommendations on ways we may improve?  left message     Overall were you satisfied with your first visit to our practice? Left message        I appreciate you taking the time to speak with me today. Is there anything else I can do for you? Left message       Thank you, and have a great day.

## 2022-03-25 ENCOUNTER — HOSPITAL ENCOUNTER (INPATIENT)
Facility: HOSPITAL | Age: 87
LOS: 9 days | Discharge: HOME | End: 2022-04-04
Attending: EMERGENCY MEDICINE | Admitting: THORACIC SURGERY (CARDIOTHORACIC VASCULAR SURGERY)

## 2022-03-25 ENCOUNTER — APPOINTMENT (OUTPATIENT)
Dept: GENERAL RADIOLOGY | Facility: HOSPITAL | Age: 87
End: 2022-03-25

## 2022-03-25 DIAGNOSIS — R13.19 ESOPHAGEAL DYSPHAGIA: Primary | ICD-10-CM

## 2022-03-25 DIAGNOSIS — K22.5 ZENKER'S DIVERTICULUM: ICD-10-CM

## 2022-03-25 DIAGNOSIS — I35.0 NONRHEUMATIC AORTIC VALVE STENOSIS: ICD-10-CM

## 2022-03-25 LAB
ALBUMIN SERPL-MCNC: 3.6 G/DL (ref 3.5–5.2)
ALBUMIN/GLOB SERPL: 1.1 G/DL
ALP SERPL-CCNC: 104 U/L (ref 39–117)
ALT SERPL W P-5'-P-CCNC: 12 U/L (ref 1–33)
ANION GAP SERPL CALCULATED.3IONS-SCNC: 11 MMOL/L (ref 5–15)
AST SERPL-CCNC: 17 U/L (ref 1–32)
BASOPHILS # BLD AUTO: 0.07 10*3/MM3 (ref 0–0.2)
BASOPHILS NFR BLD AUTO: 0.6 % (ref 0–1.5)
BILIRUB SERPL-MCNC: 0.2 MG/DL (ref 0–1.2)
BUN SERPL-MCNC: 35 MG/DL (ref 8–23)
BUN/CREAT SERPL: 28.2 (ref 7–25)
CALCIUM SPEC-SCNC: 9 MG/DL (ref 8.2–9.6)
CHLORIDE SERPL-SCNC: 102 MMOL/L (ref 98–107)
CO2 SERPL-SCNC: 25 MMOL/L (ref 22–29)
CREAT SERPL-MCNC: 1.24 MG/DL (ref 0.57–1)
DEPRECATED RDW RBC AUTO: 40.4 FL (ref 37–54)
EGFRCR SERPLBLD CKD-EPI 2021: 40.4 ML/MIN/1.73
EOSINOPHIL # BLD AUTO: 0.3 10*3/MM3 (ref 0–0.4)
EOSINOPHIL NFR BLD AUTO: 2.7 % (ref 0.3–6.2)
ERYTHROCYTE [DISTWIDTH] IN BLOOD BY AUTOMATED COUNT: 12.7 % (ref 12.3–15.4)
GLOBULIN UR ELPH-MCNC: 3.4 GM/DL
GLUCOSE SERPL-MCNC: 100 MG/DL (ref 65–99)
HCT VFR BLD AUTO: 30.8 % (ref 34–46.6)
HGB BLD-MCNC: 10.4 G/DL (ref 12–15.9)
IMM GRANULOCYTES # BLD AUTO: 0.04 10*3/MM3 (ref 0–0.05)
IMM GRANULOCYTES NFR BLD AUTO: 0.4 % (ref 0–0.5)
LIPASE SERPL-CCNC: 9 U/L (ref 13–60)
LYMPHOCYTES # BLD AUTO: 2.06 10*3/MM3 (ref 0.7–3.1)
LYMPHOCYTES NFR BLD AUTO: 18.4 % (ref 19.6–45.3)
MCH RBC QN AUTO: 29.7 PG (ref 26.6–33)
MCHC RBC AUTO-ENTMCNC: 33.8 G/DL (ref 31.5–35.7)
MCV RBC AUTO: 88 FL (ref 79–97)
MONOCYTES # BLD AUTO: 0.99 10*3/MM3 (ref 0.1–0.9)
MONOCYTES NFR BLD AUTO: 8.8 % (ref 5–12)
NEUTROPHILS NFR BLD AUTO: 69.1 % (ref 42.7–76)
NEUTROPHILS NFR BLD AUTO: 7.76 10*3/MM3 (ref 1.7–7)
NRBC BLD AUTO-RTO: 0 /100 WBC (ref 0–0.2)
PLATELET # BLD AUTO: 296 10*3/MM3 (ref 140–450)
PMV BLD AUTO: 9.7 FL (ref 6–12)
POTASSIUM SERPL-SCNC: 4.1 MMOL/L (ref 3.5–5.2)
PROT SERPL-MCNC: 7 G/DL (ref 6–8.5)
RBC # BLD AUTO: 3.5 10*6/MM3 (ref 3.77–5.28)
SARS-COV-2 RNA PNL SPEC NAA+PROBE: NOT DETECTED
SODIUM SERPL-SCNC: 138 MMOL/L (ref 136–145)
WBC NRBC COR # BLD: 11.22 10*3/MM3 (ref 3.4–10.8)

## 2022-03-25 PROCEDURE — 83690 ASSAY OF LIPASE: CPT | Performed by: EMERGENCY MEDICINE

## 2022-03-25 PROCEDURE — 85025 COMPLETE CBC W/AUTO DIFF WBC: CPT | Performed by: EMERGENCY MEDICINE

## 2022-03-25 PROCEDURE — G0378 HOSPITAL OBSERVATION PER HR: HCPCS

## 2022-03-25 PROCEDURE — 80053 COMPREHEN METABOLIC PANEL: CPT | Performed by: EMERGENCY MEDICINE

## 2022-03-25 PROCEDURE — 87635 SARS-COV-2 COVID-19 AMP PRB: CPT | Performed by: EMERGENCY MEDICINE

## 2022-03-25 PROCEDURE — 71045 X-RAY EXAM CHEST 1 VIEW: CPT

## 2022-03-25 PROCEDURE — 99284 EMERGENCY DEPT VISIT MOD MDM: CPT

## 2022-03-25 RX ORDER — UREA 10 %
3 LOTION (ML) TOPICAL NIGHTLY PRN
Status: DISCONTINUED | OUTPATIENT
Start: 2022-03-25 | End: 2022-04-04 | Stop reason: HOSPADM

## 2022-03-25 RX ORDER — ONDANSETRON 4 MG/1
4 TABLET, FILM COATED ORAL EVERY 6 HOURS PRN
Status: DISCONTINUED | OUTPATIENT
Start: 2022-03-25 | End: 2022-04-04 | Stop reason: HOSPADM

## 2022-03-25 RX ORDER — ONDANSETRON 2 MG/ML
4 INJECTION INTRAMUSCULAR; INTRAVENOUS EVERY 6 HOURS PRN
Status: DISCONTINUED | OUTPATIENT
Start: 2022-03-25 | End: 2022-04-04 | Stop reason: HOSPADM

## 2022-03-25 RX ORDER — ACETAMINOPHEN 325 MG/1
650 TABLET ORAL EVERY 4 HOURS PRN
Status: DISCONTINUED | OUTPATIENT
Start: 2022-03-25 | End: 2022-04-04 | Stop reason: HOSPADM

## 2022-03-25 RX ORDER — NITROGLYCERIN 0.4 MG/1
0.4 TABLET SUBLINGUAL
Status: DISCONTINUED | OUTPATIENT
Start: 2022-03-25 | End: 2022-04-04 | Stop reason: HOSPADM

## 2022-03-25 RX ORDER — SODIUM CHLORIDE 9 MG/ML
75 INJECTION, SOLUTION INTRAVENOUS CONTINUOUS
Status: DISCONTINUED | OUTPATIENT
Start: 2022-03-25 | End: 2022-03-26

## 2022-03-25 RX ORDER — SODIUM CHLORIDE 0.9 % (FLUSH) 0.9 %
10 SYRINGE (ML) INJECTION AS NEEDED
Status: DISCONTINUED | OUTPATIENT
Start: 2022-03-25 | End: 2022-04-04 | Stop reason: HOSPADM

## 2022-03-25 RX ADMIN — SODIUM CHLORIDE 500 ML: 9 INJECTION, SOLUTION INTRAVENOUS at 18:47

## 2022-03-25 RX ADMIN — SODIUM CHLORIDE 75 ML/HR: 9 INJECTION, SOLUTION INTRAVENOUS at 23:04

## 2022-03-26 ENCOUNTER — APPOINTMENT (OUTPATIENT)
Dept: GENERAL RADIOLOGY | Facility: HOSPITAL | Age: 87
End: 2022-03-26

## 2022-03-26 PROBLEM — K22.70 BARRETT ESOPHAGUS: Status: ACTIVE | Noted: 2018-01-01

## 2022-03-26 LAB
ANION GAP SERPL CALCULATED.3IONS-SCNC: 13.1 MMOL/L (ref 5–15)
BUN SERPL-MCNC: 30 MG/DL (ref 8–23)
BUN/CREAT SERPL: 29.4 (ref 7–25)
CALCIUM SPEC-SCNC: 8.5 MG/DL (ref 8.2–9.6)
CHLORIDE SERPL-SCNC: 103 MMOL/L (ref 98–107)
CO2 SERPL-SCNC: 20.9 MMOL/L (ref 22–29)
CREAT SERPL-MCNC: 1.02 MG/DL (ref 0.57–1)
DEPRECATED RDW RBC AUTO: 39.9 FL (ref 37–54)
EGFRCR SERPLBLD CKD-EPI 2021: 51.1 ML/MIN/1.73
ERYTHROCYTE [DISTWIDTH] IN BLOOD BY AUTOMATED COUNT: 12.7 % (ref 12.3–15.4)
GLUCOSE SERPL-MCNC: 92 MG/DL (ref 65–99)
HCT VFR BLD AUTO: 29.3 % (ref 34–46.6)
HGB BLD-MCNC: 9.9 G/DL (ref 12–15.9)
MCH RBC QN AUTO: 29.3 PG (ref 26.6–33)
MCHC RBC AUTO-ENTMCNC: 33.8 G/DL (ref 31.5–35.7)
MCV RBC AUTO: 86.7 FL (ref 79–97)
PLATELET # BLD AUTO: 292 10*3/MM3 (ref 140–450)
PMV BLD AUTO: 9.9 FL (ref 6–12)
POTASSIUM SERPL-SCNC: 4.1 MMOL/L (ref 3.5–5.2)
RBC # BLD AUTO: 3.38 10*6/MM3 (ref 3.77–5.28)
SODIUM SERPL-SCNC: 137 MMOL/L (ref 136–145)
WBC NRBC COR # BLD: 9.66 10*3/MM3 (ref 3.4–10.8)

## 2022-03-26 PROCEDURE — 85027 COMPLETE CBC AUTOMATED: CPT | Performed by: INTERNAL MEDICINE

## 2022-03-26 PROCEDURE — 74220 X-RAY XM ESOPHAGUS 1CNTRST: CPT

## 2022-03-26 PROCEDURE — 36415 COLL VENOUS BLD VENIPUNCTURE: CPT | Performed by: INTERNAL MEDICINE

## 2022-03-26 PROCEDURE — 80048 BASIC METABOLIC PNL TOTAL CA: CPT | Performed by: INTERNAL MEDICINE

## 2022-03-26 PROCEDURE — 99222 1ST HOSP IP/OBS MODERATE 55: CPT | Performed by: INTERNAL MEDICINE

## 2022-03-26 RX ORDER — CETIRIZINE HYDROCHLORIDE 10 MG/1
5 TABLET ORAL DAILY
Status: DISCONTINUED | OUTPATIENT
Start: 2022-03-26 | End: 2022-04-04 | Stop reason: HOSPADM

## 2022-03-26 RX ORDER — SODIUM CHLORIDE, SODIUM LACTATE, POTASSIUM CHLORIDE, CALCIUM CHLORIDE 600; 310; 30; 20 MG/100ML; MG/100ML; MG/100ML; MG/100ML
75 INJECTION, SOLUTION INTRAVENOUS CONTINUOUS
Status: ACTIVE | OUTPATIENT
Start: 2022-03-26 | End: 2022-03-29

## 2022-03-26 RX ORDER — ASPIRIN 81 MG/1
81 TABLET ORAL DAILY
Status: DISCONTINUED | OUTPATIENT
Start: 2022-03-26 | End: 2022-04-04 | Stop reason: HOSPADM

## 2022-03-26 RX ADMIN — SODIUM CHLORIDE, POTASSIUM CHLORIDE, SODIUM LACTATE AND CALCIUM CHLORIDE 75 ML/HR: 600; 310; 30; 20 INJECTION, SOLUTION INTRAVENOUS at 12:33

## 2022-03-27 LAB
ANION GAP SERPL CALCULATED.3IONS-SCNC: 11.5 MMOL/L (ref 5–15)
BUN SERPL-MCNC: 27 MG/DL (ref 8–23)
BUN/CREAT SERPL: 25 (ref 7–25)
CALCIUM SPEC-SCNC: 8.6 MG/DL (ref 8.2–9.6)
CHLORIDE SERPL-SCNC: 102 MMOL/L (ref 98–107)
CO2 SERPL-SCNC: 20.5 MMOL/L (ref 22–29)
CREAT SERPL-MCNC: 1.08 MG/DL (ref 0.57–1)
DEPRECATED RDW RBC AUTO: 42.2 FL (ref 37–54)
EGFRCR SERPLBLD CKD-EPI 2021: 47.7 ML/MIN/1.73
ERYTHROCYTE [DISTWIDTH] IN BLOOD BY AUTOMATED COUNT: 12.8 % (ref 12.3–15.4)
GLUCOSE SERPL-MCNC: 72 MG/DL (ref 65–99)
HCT VFR BLD AUTO: 29.3 % (ref 34–46.6)
HGB BLD-MCNC: 9.6 G/DL (ref 12–15.9)
MCH RBC QN AUTO: 29.4 PG (ref 26.6–33)
MCHC RBC AUTO-ENTMCNC: 32.8 G/DL (ref 31.5–35.7)
MCV RBC AUTO: 89.9 FL (ref 79–97)
PLATELET # BLD AUTO: 269 10*3/MM3 (ref 140–450)
PMV BLD AUTO: 9.5 FL (ref 6–12)
POTASSIUM SERPL-SCNC: 4.3 MMOL/L (ref 3.5–5.2)
RBC # BLD AUTO: 3.26 10*6/MM3 (ref 3.77–5.28)
SODIUM SERPL-SCNC: 134 MMOL/L (ref 136–145)
WBC NRBC COR # BLD: 10.06 10*3/MM3 (ref 3.4–10.8)

## 2022-03-27 PROCEDURE — 99232 SBSQ HOSP IP/OBS MODERATE 35: CPT | Performed by: INTERNAL MEDICINE

## 2022-03-27 PROCEDURE — 85027 COMPLETE CBC AUTOMATED: CPT | Performed by: HOSPITALIST

## 2022-03-27 PROCEDURE — 99221 1ST HOSP IP/OBS SF/LOW 40: CPT | Performed by: NURSE PRACTITIONER

## 2022-03-27 PROCEDURE — 80048 BASIC METABOLIC PNL TOTAL CA: CPT | Performed by: HOSPITALIST

## 2022-03-27 RX ADMIN — SODIUM CHLORIDE, POTASSIUM CHLORIDE, SODIUM LACTATE AND CALCIUM CHLORIDE 75 ML/HR: 600; 310; 30; 20 INJECTION, SOLUTION INTRAVENOUS at 15:00

## 2022-03-28 PROBLEM — K22.5 ZENKER'S DIVERTICULUM: Status: ACTIVE | Noted: 2022-03-25

## 2022-03-28 PROBLEM — E88.89 KETOSIS (HCC): Status: ACTIVE | Noted: 2022-03-28

## 2022-03-28 PROBLEM — D72.829 LEUKOCYTOSIS: Status: ACTIVE | Noted: 2022-03-28

## 2022-03-28 LAB
ANION GAP SERPL CALCULATED.3IONS-SCNC: 16.4 MMOL/L (ref 5–15)
BACTERIA UR QL AUTO: ABNORMAL /HPF
BILIRUB UR QL STRIP: NEGATIVE
BUN SERPL-MCNC: 26 MG/DL (ref 8–23)
BUN/CREAT SERPL: 26.8 (ref 7–25)
CALCIUM SPEC-SCNC: 9.2 MG/DL (ref 8.2–9.6)
CHLORIDE SERPL-SCNC: 101 MMOL/L (ref 98–107)
CLARITY UR: CLEAR
CO2 SERPL-SCNC: 17.6 MMOL/L (ref 22–29)
COLOR UR: YELLOW
CREAT SERPL-MCNC: 0.97 MG/DL (ref 0.57–1)
DEPRECATED RDW RBC AUTO: 42.8 FL (ref 37–54)
EGFRCR SERPLBLD CKD-EPI 2021: 54.3 ML/MIN/1.73
ERYTHROCYTE [DISTWIDTH] IN BLOOD BY AUTOMATED COUNT: 12.9 % (ref 12.3–15.4)
GLUCOSE BLDC GLUCOMTR-MCNC: 177 MG/DL (ref 70–130)
GLUCOSE BLDC GLUCOMTR-MCNC: 91 MG/DL (ref 70–130)
GLUCOSE BLDC GLUCOMTR-MCNC: 93 MG/DL (ref 70–130)
GLUCOSE SERPL-MCNC: 65 MG/DL (ref 65–99)
GLUCOSE UR STRIP-MCNC: NEGATIVE MG/DL
HCT VFR BLD AUTO: 34 % (ref 34–46.6)
HGB BLD-MCNC: 11.1 G/DL (ref 12–15.9)
HGB UR QL STRIP.AUTO: NEGATIVE
HYALINE CASTS UR QL AUTO: ABNORMAL /LPF
KETONES UR QL STRIP: ABNORMAL
LEUKOCYTE ESTERASE UR QL STRIP.AUTO: ABNORMAL
MCH RBC QN AUTO: 29.6 PG (ref 26.6–33)
MCHC RBC AUTO-ENTMCNC: 32.6 G/DL (ref 31.5–35.7)
MCV RBC AUTO: 90.7 FL (ref 79–97)
NITRITE UR QL STRIP: NEGATIVE
PH UR STRIP.AUTO: <=5 [PH] (ref 5–8)
PLATELET # BLD AUTO: 317 10*3/MM3 (ref 140–450)
PMV BLD AUTO: 10.5 FL (ref 6–12)
POTASSIUM SERPL-SCNC: 5.1 MMOL/L (ref 3.5–5.2)
PROT UR QL STRIP: NEGATIVE
QT INTERVAL: 351 MS
RBC # BLD AUTO: 3.75 10*6/MM3 (ref 3.77–5.28)
RBC # UR STRIP: ABNORMAL /HPF
REF LAB TEST METHOD: ABNORMAL
SODIUM SERPL-SCNC: 135 MMOL/L (ref 136–145)
SP GR UR STRIP: 1.02 (ref 1–1.03)
SQUAMOUS #/AREA URNS HPF: ABNORMAL /HPF
UROBILINOGEN UR QL STRIP: ABNORMAL
WBC # UR STRIP: ABNORMAL /HPF
WBC NRBC COR # BLD: 15.4 10*3/MM3 (ref 3.4–10.8)

## 2022-03-28 PROCEDURE — 82962 GLUCOSE BLOOD TEST: CPT

## 2022-03-28 PROCEDURE — 85027 COMPLETE CBC AUTOMATED: CPT | Performed by: HOSPITALIST

## 2022-03-28 PROCEDURE — 99222 1ST HOSP IP/OBS MODERATE 55: CPT | Performed by: INTERNAL MEDICINE

## 2022-03-28 PROCEDURE — 80048 BASIC METABOLIC PNL TOTAL CA: CPT | Performed by: HOSPITALIST

## 2022-03-28 PROCEDURE — 93005 ELECTROCARDIOGRAM TRACING: CPT | Performed by: INTERNAL MEDICINE

## 2022-03-28 PROCEDURE — 99232 SBSQ HOSP IP/OBS MODERATE 35: CPT | Performed by: NURSE PRACTITIONER

## 2022-03-28 PROCEDURE — 93010 ELECTROCARDIOGRAM REPORT: CPT | Performed by: INTERNAL MEDICINE

## 2022-03-28 PROCEDURE — 81001 URINALYSIS AUTO W/SCOPE: CPT | Performed by: HOSPITALIST

## 2022-03-28 PROCEDURE — 99232 SBSQ HOSP IP/OBS MODERATE 35: CPT | Performed by: INTERNAL MEDICINE

## 2022-03-28 RX ORDER — DEXTROSE MONOHYDRATE 25 G/50ML
25 INJECTION, SOLUTION INTRAVENOUS
Status: DISCONTINUED | OUTPATIENT
Start: 2022-03-28 | End: 2022-04-04 | Stop reason: HOSPADM

## 2022-03-28 RX ORDER — NICOTINE POLACRILEX 4 MG
15 LOZENGE BUCCAL
Status: DISCONTINUED | OUTPATIENT
Start: 2022-03-28 | End: 2022-04-04 | Stop reason: HOSPADM

## 2022-03-28 RX ADMIN — SODIUM CHLORIDE, POTASSIUM CHLORIDE, SODIUM LACTATE AND CALCIUM CHLORIDE 75 ML/HR: 600; 310; 30; 20 INJECTION, SOLUTION INTRAVENOUS at 04:08

## 2022-03-28 RX ADMIN — DEXTROSE MONOHYDRATE 25 G: 25 INJECTION, SOLUTION INTRAVENOUS at 11:42

## 2022-03-29 ENCOUNTER — APPOINTMENT (OUTPATIENT)
Dept: GENERAL RADIOLOGY | Facility: HOSPITAL | Age: 87
End: 2022-03-29

## 2022-03-29 ENCOUNTER — APPOINTMENT (OUTPATIENT)
Dept: CARDIOLOGY | Facility: HOSPITAL | Age: 87
End: 2022-03-29

## 2022-03-29 LAB
ANION GAP SERPL CALCULATED.3IONS-SCNC: 19.2 MMOL/L (ref 5–15)
AORTIC DIMENSIONLESS INDEX: 0.3 (DI)
BH CV ECHO MEAS - AO MAX PG: 25.2 MMHG
BH CV ECHO MEAS - AO MEAN PG: 14.3 MMHG
BH CV ECHO MEAS - AO ROOT DIAM: 3 CM
BH CV ECHO MEAS - AO V2 MAX: 250.8 CM/SEC
BH CV ECHO MEAS - AO V2 VTI: 59.2 CM
BH CV ECHO MEAS - AVA(I,D): 1.09 CM2
BH CV ECHO MEAS - EDV(CUBED): 84.4 ML
BH CV ECHO MEAS - EDV(MOD-SP2): 91 ML
BH CV ECHO MEAS - EDV(MOD-SP4): 95 ML
BH CV ECHO MEAS - EF(MOD-BP): 69.5 %
BH CV ECHO MEAS - EF(MOD-SP2): 69.2 %
BH CV ECHO MEAS - EF(MOD-SP4): 70.5 %
BH CV ECHO MEAS - ESV(CUBED): 28.6 ML
BH CV ECHO MEAS - ESV(MOD-SP2): 28 ML
BH CV ECHO MEAS - ESV(MOD-SP4): 28 ML
BH CV ECHO MEAS - FS: 30.3 %
BH CV ECHO MEAS - IVS/LVPW: 0.97 CM
BH CV ECHO MEAS - IVSD: 0.98 CM
BH CV ECHO MEAS - LAT PEAK E' VEL: 4.6 CM/SEC
BH CV ECHO MEAS - LV DIASTOLIC VOL/BSA (35-75): 63 CM2
BH CV ECHO MEAS - LV MASS(C)D: 146.6 GRAMS
BH CV ECHO MEAS - LV MAX PG: 2.9 MMHG
BH CV ECHO MEAS - LV MEAN PG: 1.51 MMHG
BH CV ECHO MEAS - LV SYSTOLIC VOL/BSA (12-30): 18.6 CM2
BH CV ECHO MEAS - LV V1 MAX: 85.3 CM/SEC
BH CV ECHO MEAS - LV V1 VTI: 20.3 CM
BH CV ECHO MEAS - LVIDD: 4.4 CM
BH CV ECHO MEAS - LVIDS: 3.1 CM
BH CV ECHO MEAS - LVOT AREA: 3.2 CM2
BH CV ECHO MEAS - LVOT DIAM: 2.01 CM
BH CV ECHO MEAS - LVPWD: 1.01 CM
BH CV ECHO MEAS - MED PEAK E' VEL: 7.4 CM/SEC
BH CV ECHO MEAS - MV A DUR: 0.11 SEC
BH CV ECHO MEAS - MV A MAX VEL: 107.1 CM/SEC
BH CV ECHO MEAS - MV DEC SLOPE: 395.6 CM/SEC2
BH CV ECHO MEAS - MV DEC TIME: 209 MSEC
BH CV ECHO MEAS - MV E MAX VEL: 68.6 CM/SEC
BH CV ECHO MEAS - MV E/A: 0.64
BH CV ECHO MEAS - MV MAX PG: 6.2 MMHG
BH CV ECHO MEAS - MV MEAN PG: 2.29 MMHG
BH CV ECHO MEAS - MV V2 VTI: 29 CM
BH CV ECHO MEAS - MVA(VTI): 2.22 CM2
BH CV ECHO MEAS - PA ACC TIME: 0.14 SEC
BH CV ECHO MEAS - PA PR(ACCEL): 14.8 MMHG
BH CV ECHO MEAS - PA V2 MAX: 150.3 CM/SEC
BH CV ECHO MEAS - RAP SYSTOLE: 8 MMHG
BH CV ECHO MEAS - RV MAX PG: 1.74 MMHG
BH CV ECHO MEAS - RV V1 MAX: 66 CM/SEC
BH CV ECHO MEAS - RV V1 VTI: 15.8 CM
BH CV ECHO MEAS - RVSP: 22.9 MMHG
BH CV ECHO MEAS - SI(MOD-SP2): 41.8 ML/M2
BH CV ECHO MEAS - SI(MOD-SP4): 44.4 ML/M2
BH CV ECHO MEAS - SV(LVOT): 64.3 ML
BH CV ECHO MEAS - SV(MOD-SP2): 63 ML
BH CV ECHO MEAS - SV(MOD-SP4): 67 ML
BH CV ECHO MEAS - TAPSE (>1.6): 1.7 CM
BH CV ECHO MEAS - TR MAX PG: 14.9 MMHG
BH CV ECHO MEAS - TR MAX VEL: 193 CM/SEC
BH CV ECHO MEASUREMENTS AVERAGE E/E' RATIO: 11.43
BH CV XLRA - RV BASE: 3.4 CM
BH CV XLRA - RV LENGTH: 7.5 CM
BH CV XLRA - RV MID: 3.5 CM
BH CV XLRA - TDI S': 15.1 CM/SEC
BH CV XLRA MEAS LEFT DIST CCA EDV: 18.9 CM/SEC
BH CV XLRA MEAS LEFT DIST CCA PSV: 99.6 CM/SEC
BH CV XLRA MEAS LEFT DIST ICA EDV: -28.3 CM/SEC
BH CV XLRA MEAS LEFT DIST ICA PSV: -113 CM/SEC
BH CV XLRA MEAS LEFT ICA/CCA RATIO: 1.89
BH CV XLRA MEAS LEFT MID ICA EDV: 26.5 CM/SEC
BH CV XLRA MEAS LEFT MID ICA PSV: 134 CM/SEC
BH CV XLRA MEAS LEFT PROX CCA EDV: 16.7 CM/SEC
BH CV XLRA MEAS LEFT PROX CCA PSV: 74.6 CM/SEC
BH CV XLRA MEAS LEFT PROX ECA EDV: 11.8 CM/SEC
BH CV XLRA MEAS LEFT PROX ECA PSV: 153 CM/SEC
BH CV XLRA MEAS LEFT PROX ICA EDV: 36.3 CM/SEC
BH CV XLRA MEAS LEFT PROX ICA PSV: 188 CM/SEC
BH CV XLRA MEAS LEFT PROX SCLA PSV: 182 CM/SEC
BH CV XLRA MEAS LEFT VERTEBRAL A EDV: -9.8 CM/SEC
BH CV XLRA MEAS LEFT VERTEBRAL A PSV: -35.7 CM/SEC
BH CV XLRA MEAS RIGHT DIST CCA EDV: -12.6 CM/SEC
BH CV XLRA MEAS RIGHT DIST CCA PSV: -71.5 CM/SEC
BH CV XLRA MEAS RIGHT DIST ICA EDV: -24.4 CM/SEC
BH CV XLRA MEAS RIGHT DIST ICA PSV: -98.2 CM/SEC
BH CV XLRA MEAS RIGHT ICA/CCA RATIO: 1.54
BH CV XLRA MEAS RIGHT MID ICA EDV: -20 CM/SEC
BH CV XLRA MEAS RIGHT MID ICA PSV: -110 CM/SEC
BH CV XLRA MEAS RIGHT PROX CCA EDV: 13 CM/SEC
BH CV XLRA MEAS RIGHT PROX CCA PSV: 80.5 CM/SEC
BH CV XLRA MEAS RIGHT PROX ECA EDV: -3.8 CM/SEC
BH CV XLRA MEAS RIGHT PROX ECA PSV: -90 CM/SEC
BH CV XLRA MEAS RIGHT PROX ICA EDV: -7.9 CM/SEC
BH CV XLRA MEAS RIGHT PROX ICA PSV: -97.2 CM/SEC
BH CV XLRA MEAS RIGHT PROX SCLA PSV: 144 CM/SEC
BH CV XLRA MEAS RIGHT VERTEBRAL A EDV: -20.4 CM/SEC
BH CV XLRA MEAS RIGHT VERTEBRAL A PSV: -79.4 CM/SEC
BUN SERPL-MCNC: 19 MG/DL (ref 8–23)
BUN/CREAT SERPL: 21.6 (ref 7–25)
CALCIUM SPEC-SCNC: 8.5 MG/DL (ref 8.2–9.6)
CHLORIDE SERPL-SCNC: 97 MMOL/L (ref 98–107)
CO2 SERPL-SCNC: 20.8 MMOL/L (ref 22–29)
CREAT SERPL-MCNC: 0.88 MG/DL (ref 0.57–1)
DEPRECATED RDW RBC AUTO: 40.1 FL (ref 37–54)
EGFRCR SERPLBLD CKD-EPI 2021: 61 ML/MIN/1.73
ERYTHROCYTE [DISTWIDTH] IN BLOOD BY AUTOMATED COUNT: 12.6 % (ref 12.3–15.4)
GLUCOSE BLDC GLUCOMTR-MCNC: 72 MG/DL (ref 70–130)
GLUCOSE BLDC GLUCOMTR-MCNC: 89 MG/DL (ref 70–130)
GLUCOSE SERPL-MCNC: 91 MG/DL (ref 65–99)
HCT VFR BLD AUTO: 30.7 % (ref 34–46.6)
HGB BLD-MCNC: 10.2 G/DL (ref 12–15.9)
LEFT ARM BP: NORMAL MMHG
LEFT ATRIUM VOLUME INDEX: 18.3 ML/M2
MAGNESIUM SERPL-MCNC: 1.5 MG/DL (ref 1.7–2.3)
MAXIMAL PREDICTED HEART RATE: 126 BPM
MAXIMAL PREDICTED HEART RATE: 126 BPM
MCH RBC QN AUTO: 29.1 PG (ref 26.6–33)
MCHC RBC AUTO-ENTMCNC: 33.2 G/DL (ref 31.5–35.7)
MCV RBC AUTO: 87.7 FL (ref 79–97)
PHOSPHATE SERPL-MCNC: 4 MG/DL (ref 2.5–4.5)
PLATELET # BLD AUTO: 267 10*3/MM3 (ref 140–450)
PMV BLD AUTO: 9.7 FL (ref 6–12)
POTASSIUM SERPL-SCNC: 4 MMOL/L (ref 3.5–5.2)
RBC # BLD AUTO: 3.5 10*6/MM3 (ref 3.77–5.28)
RIGHT ARM BP: NORMAL MMHG
SODIUM SERPL-SCNC: 137 MMOL/L (ref 136–145)
STRESS TARGET HR: 107 BPM
STRESS TARGET HR: 107 BPM
TRIGL SERPL-MCNC: 102 MG/DL (ref 0–150)
WBC NRBC COR # BLD: 12.89 10*3/MM3 (ref 3.4–10.8)

## 2022-03-29 PROCEDURE — 74018 RADEX ABDOMEN 1 VIEW: CPT

## 2022-03-29 PROCEDURE — 25010000002 MAGNESIUM SULFATE 2 GM/50ML SOLUTION: Performed by: HOSPITALIST

## 2022-03-29 PROCEDURE — 97530 THERAPEUTIC ACTIVITIES: CPT

## 2022-03-29 PROCEDURE — 71045 X-RAY EXAM CHEST 1 VIEW: CPT

## 2022-03-29 PROCEDURE — 99024 POSTOP FOLLOW-UP VISIT: CPT | Performed by: NURSE PRACTITIONER

## 2022-03-29 PROCEDURE — 85027 COMPLETE CBC AUTOMATED: CPT | Performed by: HOSPITALIST

## 2022-03-29 PROCEDURE — 80048 BASIC METABOLIC PNL TOTAL CA: CPT | Performed by: HOSPITALIST

## 2022-03-29 PROCEDURE — 93306 TTE W/DOPPLER COMPLETE: CPT

## 2022-03-29 PROCEDURE — 25010000002 PERFLUTREN (DEFINITY) 8.476 MG IN SODIUM CHLORIDE (PF) 0.9 % 10 ML INJECTION: Performed by: INTERNAL MEDICINE

## 2022-03-29 PROCEDURE — 93880 EXTRACRANIAL BILAT STUDY: CPT

## 2022-03-29 PROCEDURE — 84100 ASSAY OF PHOSPHORUS: CPT | Performed by: NURSE PRACTITIONER

## 2022-03-29 PROCEDURE — 25010000002 MAGNESIUM SULFATE 2 GM/50ML SOLUTION: Performed by: NURSE PRACTITIONER

## 2022-03-29 PROCEDURE — 93306 TTE W/DOPPLER COMPLETE: CPT | Performed by: INTERNAL MEDICINE

## 2022-03-29 PROCEDURE — 73502 X-RAY EXAM HIP UNI 2-3 VIEWS: CPT

## 2022-03-29 PROCEDURE — C1751 CATH, INF, PER/CENT/MIDLINE: HCPCS

## 2022-03-29 PROCEDURE — 99232 SBSQ HOSP IP/OBS MODERATE 35: CPT | Performed by: INTERNAL MEDICINE

## 2022-03-29 PROCEDURE — 82962 GLUCOSE BLOOD TEST: CPT

## 2022-03-29 PROCEDURE — 97162 PT EVAL MOD COMPLEX 30 MIN: CPT

## 2022-03-29 PROCEDURE — 84478 ASSAY OF TRIGLYCERIDES: CPT | Performed by: NURSE PRACTITIONER

## 2022-03-29 PROCEDURE — 83735 ASSAY OF MAGNESIUM: CPT | Performed by: NURSE PRACTITIONER

## 2022-03-29 RX ORDER — SODIUM CHLORIDE 0.9 % (FLUSH) 0.9 %
10 SYRINGE (ML) INJECTION EVERY 12 HOURS SCHEDULED
Status: DISCONTINUED | OUTPATIENT
Start: 2022-03-29 | End: 2022-04-04 | Stop reason: HOSPADM

## 2022-03-29 RX ORDER — SODIUM CHLORIDE 0.9 % (FLUSH) 0.9 %
10 SYRINGE (ML) INJECTION AS NEEDED
Status: DISCONTINUED | OUTPATIENT
Start: 2022-03-29 | End: 2022-04-04 | Stop reason: HOSPADM

## 2022-03-29 RX ORDER — MAGNESIUM SULFATE HEPTAHYDRATE 40 MG/ML
4 INJECTION, SOLUTION INTRAVENOUS AS NEEDED
Status: DISCONTINUED | OUTPATIENT
Start: 2022-03-29 | End: 2022-04-04 | Stop reason: HOSPADM

## 2022-03-29 RX ORDER — MAGNESIUM SULFATE HEPTAHYDRATE 40 MG/ML
2 INJECTION, SOLUTION INTRAVENOUS AS NEEDED
Status: DISCONTINUED | OUTPATIENT
Start: 2022-03-29 | End: 2022-04-04 | Stop reason: HOSPADM

## 2022-03-29 RX ORDER — SODIUM CHLORIDE, SODIUM LACTATE, POTASSIUM CHLORIDE, CALCIUM CHLORIDE 600; 310; 30; 20 MG/100ML; MG/100ML; MG/100ML; MG/100ML
75 INJECTION, SOLUTION INTRAVENOUS CONTINUOUS
Status: ACTIVE | OUTPATIENT
Start: 2022-03-29 | End: 2022-03-30

## 2022-03-29 RX ORDER — PANTOPRAZOLE SODIUM 40 MG/10ML
40 INJECTION, POWDER, LYOPHILIZED, FOR SOLUTION INTRAVENOUS
Status: DISCONTINUED | OUTPATIENT
Start: 2022-03-29 | End: 2022-04-04 | Stop reason: HOSPADM

## 2022-03-29 RX ORDER — POTASSIUM CHLORIDE 1.5 G/1.77G
40 POWDER, FOR SOLUTION ORAL AS NEEDED
Status: DISCONTINUED | OUTPATIENT
Start: 2022-03-29 | End: 2022-04-04 | Stop reason: HOSPADM

## 2022-03-29 RX ORDER — POTASSIUM CHLORIDE 750 MG/1
40 TABLET, FILM COATED, EXTENDED RELEASE ORAL AS NEEDED
Status: DISCONTINUED | OUTPATIENT
Start: 2022-03-29 | End: 2022-04-04 | Stop reason: HOSPADM

## 2022-03-29 RX ORDER — SODIUM CHLORIDE 0.9 % (FLUSH) 0.9 %
20 SYRINGE (ML) INJECTION AS NEEDED
Status: DISCONTINUED | OUTPATIENT
Start: 2022-03-29 | End: 2022-04-04 | Stop reason: HOSPADM

## 2022-03-29 RX ORDER — ACETAMINOPHEN 650 MG/1
650 SUPPOSITORY RECTAL EVERY 4 HOURS PRN
Status: DISCONTINUED | OUTPATIENT
Start: 2022-03-29 | End: 2022-04-04 | Stop reason: HOSPADM

## 2022-03-29 RX ORDER — POTASSIUM CHLORIDE 7.45 MG/ML
10 INJECTION INTRAVENOUS
Status: DISCONTINUED | OUTPATIENT
Start: 2022-03-29 | End: 2022-04-04 | Stop reason: HOSPADM

## 2022-03-29 RX ADMIN — SODIUM CHLORIDE, POTASSIUM CHLORIDE, SODIUM LACTATE AND CALCIUM CHLORIDE 75 ML/HR: 600; 310; 30; 20 INJECTION, SOLUTION INTRAVENOUS at 06:02

## 2022-03-29 RX ADMIN — ACETAMINOPHEN 650 MG: 650 SUPPOSITORY RECTAL at 03:47

## 2022-03-29 RX ADMIN — SODIUM CHLORIDE, POTASSIUM CHLORIDE, SODIUM LACTATE AND CALCIUM CHLORIDE 75 ML/HR: 600; 310; 30; 20 INJECTION, SOLUTION INTRAVENOUS at 22:25

## 2022-03-29 RX ADMIN — MAGNESIUM SULFATE HEPTAHYDRATE 2 G: 2 INJECTION, SOLUTION INTRAVENOUS at 15:37

## 2022-03-29 RX ADMIN — MAGNESIUM SULFATE HEPTAHYDRATE 2 G: 2 INJECTION, SOLUTION INTRAVENOUS at 17:50

## 2022-03-29 RX ADMIN — PERFLUTREN 2 ML: 6.52 INJECTION, SUSPENSION INTRAVENOUS at 14:46

## 2022-03-29 RX ADMIN — PANTOPRAZOLE SODIUM 40 MG: 40 INJECTION, POWDER, FOR SOLUTION INTRAVENOUS at 08:57

## 2022-03-29 RX ADMIN — MAGNESIUM SULFATE HEPTAHYDRATE 2 G: 2 INJECTION, SOLUTION INTRAVENOUS at 20:10

## 2022-03-29 RX ADMIN — ACETAMINOPHEN 650 MG: 650 SUPPOSITORY RECTAL at 08:58

## 2022-03-30 ENCOUNTER — APPOINTMENT (OUTPATIENT)
Dept: GENERAL RADIOLOGY | Facility: HOSPITAL | Age: 87
End: 2022-03-30

## 2022-03-30 LAB
ALBUMIN SERPL-MCNC: 3 G/DL (ref 3.5–5.2)
ALBUMIN/GLOB SERPL: 0.9 G/DL
ALP SERPL-CCNC: 88 U/L (ref 39–117)
ALT SERPL W P-5'-P-CCNC: 9 U/L (ref 1–33)
ANION GAP SERPL CALCULATED.3IONS-SCNC: 16 MMOL/L (ref 5–15)
AST SERPL-CCNC: 18 U/L (ref 1–32)
BILIRUB SERPL-MCNC: 0.3 MG/DL (ref 0–1.2)
BUN SERPL-MCNC: 22 MG/DL (ref 8–23)
BUN/CREAT SERPL: 23.4 (ref 7–25)
CALCIUM SPEC-SCNC: 8.3 MG/DL (ref 8.2–9.6)
CHLORIDE SERPL-SCNC: 99 MMOL/L (ref 98–107)
CO2 SERPL-SCNC: 20 MMOL/L (ref 22–29)
CREAT SERPL-MCNC: 0.94 MG/DL (ref 0.57–1)
EGFRCR SERPLBLD CKD-EPI 2021: 56.3 ML/MIN/1.73
GLOBULIN UR ELPH-MCNC: 3.3 GM/DL
GLUCOSE BLDC GLUCOMTR-MCNC: 147 MG/DL (ref 70–130)
GLUCOSE BLDC GLUCOMTR-MCNC: 70 MG/DL (ref 70–130)
GLUCOSE BLDC GLUCOMTR-MCNC: 74 MG/DL (ref 70–130)
GLUCOSE SERPL-MCNC: 69 MG/DL (ref 65–99)
MAGNESIUM SERPL-MCNC: 2.6 MG/DL (ref 1.7–2.3)
PHOSPHATE SERPL-MCNC: 3.1 MG/DL (ref 2.5–4.5)
POTASSIUM SERPL-SCNC: 3.9 MMOL/L (ref 3.5–5.2)
PROT SERPL-MCNC: 6.3 G/DL (ref 6–8.5)
SARS-COV-2 RNA PNL SPEC NAA+PROBE: NOT DETECTED
SODIUM SERPL-SCNC: 135 MMOL/L (ref 136–145)

## 2022-03-30 PROCEDURE — 99232 SBSQ HOSP IP/OBS MODERATE 35: CPT | Performed by: INTERNAL MEDICINE

## 2022-03-30 PROCEDURE — 83735 ASSAY OF MAGNESIUM: CPT | Performed by: NURSE PRACTITIONER

## 2022-03-30 PROCEDURE — 36597 REPOSITION VENOUS CATHETER: CPT

## 2022-03-30 PROCEDURE — 25010000002 MAGNESIUM SULFATE PER 500 MG OF MAGNESIUM: Performed by: NURSE PRACTITIONER

## 2022-03-30 PROCEDURE — 87635 SARS-COV-2 COVID-19 AMP PRB: CPT | Performed by: THORACIC SURGERY (CARDIOTHORACIC VASCULAR SURGERY)

## 2022-03-30 PROCEDURE — 25010000002 CALCIUM GLUCONATE PER 10 ML: Performed by: NURSE PRACTITIONER

## 2022-03-30 PROCEDURE — 99024 POSTOP FOLLOW-UP VISIT: CPT | Performed by: NURSE PRACTITIONER

## 2022-03-30 PROCEDURE — 82962 GLUCOSE BLOOD TEST: CPT

## 2022-03-30 PROCEDURE — 84100 ASSAY OF PHOSPHORUS: CPT | Performed by: NURSE PRACTITIONER

## 2022-03-30 PROCEDURE — C1751 CATH, INF, PER/CENT/MIDLINE: HCPCS

## 2022-03-30 PROCEDURE — 80053 COMPREHEN METABOLIC PANEL: CPT | Performed by: NURSE PRACTITIONER

## 2022-03-30 PROCEDURE — 25010000002 POTASSIUM CHLORIDE PER 2 MEQ OF POTASSIUM: Performed by: NURSE PRACTITIONER

## 2022-03-30 RX ORDER — CLINDAMYCIN PHOSPHATE 900 MG/50ML
900 INJECTION INTRAVENOUS ONCE
Status: DISCONTINUED | OUTPATIENT
Start: 2022-03-30 | End: 2022-03-30

## 2022-03-30 RX ORDER — SODIUM CHLORIDE 9 MG/ML
75 INJECTION, SOLUTION INTRAVENOUS ONCE
Status: COMPLETED | OUTPATIENT
Start: 2022-03-31 | End: 2022-03-31

## 2022-03-30 RX ORDER — CLINDAMYCIN PHOSPHATE 900 MG/50ML
900 INJECTION INTRAVENOUS ONCE
Status: CANCELLED | OUTPATIENT
Start: 2022-03-30

## 2022-03-30 RX ADMIN — SODIUM CHLORIDE, POTASSIUM CHLORIDE, SODIUM LACTATE AND CALCIUM CHLORIDE 75 ML/HR: 600; 310; 30; 20 INJECTION, SOLUTION INTRAVENOUS at 12:20

## 2022-03-30 RX ADMIN — Medication 10 ML: at 20:07

## 2022-03-30 RX ADMIN — PANTOPRAZOLE SODIUM 40 MG: 40 INJECTION, POWDER, FOR SOLUTION INTRAVENOUS at 05:47

## 2022-03-30 RX ADMIN — Medication 10 ML: at 12:19

## 2022-03-30 RX ADMIN — ACETAMINOPHEN 650 MG: 650 SUPPOSITORY RECTAL at 12:18

## 2022-03-30 RX ADMIN — SODIUM CHLORIDE 75 ML/HR: 9 INJECTION, SOLUTION INTRAVENOUS at 23:19

## 2022-03-30 RX ADMIN — CALCIUM GLUCONATE: 98 INJECTION, SOLUTION INTRAVENOUS at 17:39

## 2022-03-31 ENCOUNTER — APPOINTMENT (OUTPATIENT)
Dept: GENERAL RADIOLOGY | Facility: HOSPITAL | Age: 87
End: 2022-03-31

## 2022-03-31 ENCOUNTER — ANESTHESIA EVENT (OUTPATIENT)
Dept: PERIOP | Facility: HOSPITAL | Age: 87
End: 2022-03-31

## 2022-03-31 ENCOUNTER — ANESTHESIA (OUTPATIENT)
Dept: PERIOP | Facility: HOSPITAL | Age: 87
End: 2022-03-31

## 2022-03-31 LAB
ABO GROUP BLD: NORMAL
ALBUMIN SERPL-MCNC: 2.9 G/DL (ref 3.5–5.2)
ALBUMIN/GLOB SERPL: 0.9 G/DL
ALP SERPL-CCNC: 77 U/L (ref 39–117)
ALT SERPL W P-5'-P-CCNC: 10 U/L (ref 1–33)
ANION GAP SERPL CALCULATED.3IONS-SCNC: 7 MMOL/L (ref 5–15)
ANTI-M: NORMAL
APTT PPP: 41.8 SECONDS (ref 22.7–35.4)
AST SERPL-CCNC: 17 U/L (ref 1–32)
BASOPHILS # BLD AUTO: 0.03 10*3/MM3 (ref 0–0.2)
BASOPHILS NFR BLD AUTO: 0.3 % (ref 0–1.5)
BILIRUB SERPL-MCNC: 0.2 MG/DL (ref 0–1.2)
BLD GP AB SCN SERPL QL: POSITIVE
BLD GP AB SCN SERPL QL: POSITIVE
BUN SERPL-MCNC: 22 MG/DL (ref 8–23)
BUN/CREAT SERPL: 31 (ref 7–25)
CALCIUM SPEC-SCNC: 8.2 MG/DL (ref 8.2–9.6)
CHLORIDE SERPL-SCNC: 98 MMOL/L (ref 98–107)
CO2 SERPL-SCNC: 27 MMOL/L (ref 22–29)
CREAT SERPL-MCNC: 0.71 MG/DL (ref 0.57–1)
DEPRECATED RDW RBC AUTO: 39.1 FL (ref 37–54)
EGFRCR SERPLBLD CKD-EPI 2021: 78.9 ML/MIN/1.73
EOSINOPHIL # BLD AUTO: 0.57 10*3/MM3 (ref 0–0.4)
EOSINOPHIL NFR BLD AUTO: 6.2 % (ref 0.3–6.2)
ERYTHROCYTE [DISTWIDTH] IN BLOOD BY AUTOMATED COUNT: 12.3 % (ref 12.3–15.4)
GLOBULIN UR ELPH-MCNC: 3.1 GM/DL
GLUCOSE BLDC GLUCOMTR-MCNC: 142 MG/DL (ref 70–130)
GLUCOSE BLDC GLUCOMTR-MCNC: 172 MG/DL (ref 70–130)
GLUCOSE SERPL-MCNC: 167 MG/DL (ref 65–99)
HCT VFR BLD AUTO: 27.7 % (ref 34–46.6)
HGB BLD-MCNC: 9.3 G/DL (ref 12–15.9)
IMM GRANULOCYTES # BLD AUTO: 0.03 10*3/MM3 (ref 0–0.05)
IMM GRANULOCYTES NFR BLD AUTO: 0.3 % (ref 0–0.5)
INR PPP: 1.32 (ref 0.9–1.1)
LYMPHOCYTES # BLD AUTO: 1.11 10*3/MM3 (ref 0.7–3.1)
LYMPHOCYTES NFR BLD AUTO: 12.1 % (ref 19.6–45.3)
M ANTIGEN: NEGATIVE
MAGNESIUM SERPL-MCNC: 2 MG/DL (ref 1.7–2.3)
MCH RBC QN AUTO: 29.2 PG (ref 26.6–33)
MCHC RBC AUTO-ENTMCNC: 33.6 G/DL (ref 31.5–35.7)
MCV RBC AUTO: 87.1 FL (ref 79–97)
MONOCYTES # BLD AUTO: 0.87 10*3/MM3 (ref 0.1–0.9)
MONOCYTES NFR BLD AUTO: 9.5 % (ref 5–12)
NEUTROPHILS NFR BLD AUTO: 6.56 10*3/MM3 (ref 1.7–7)
NEUTROPHILS NFR BLD AUTO: 71.6 % (ref 42.7–76)
NRBC BLD AUTO-RTO: 0 /100 WBC (ref 0–0.2)
PHOSPHATE SERPL-MCNC: 2.2 MG/DL (ref 2.5–4.5)
PLATELET # BLD AUTO: 236 10*3/MM3 (ref 140–450)
PMV BLD AUTO: 9.9 FL (ref 6–12)
POTASSIUM SERPL-SCNC: 4.1 MMOL/L (ref 3.5–5.2)
PROT SERPL-MCNC: 6 G/DL (ref 6–8.5)
PROTHROMBIN TIME: 16.3 SECONDS (ref 11.7–14.2)
RBC # BLD AUTO: 3.18 10*6/MM3 (ref 3.77–5.28)
RH BLD: POSITIVE
SODIUM SERPL-SCNC: 132 MMOL/L (ref 136–145)
T&S EXPIRATION DATE: NORMAL
WBC NRBC COR # BLD: 9.17 10*3/MM3 (ref 3.4–10.8)

## 2022-03-31 PROCEDURE — 71045 X-RAY EXAM CHEST 1 VIEW: CPT

## 2022-03-31 PROCEDURE — 25010000002 MAGNESIUM SULFATE PER 500 MG OF MAGNESIUM: Performed by: ANESTHESIOLOGY

## 2022-03-31 PROCEDURE — 84100 ASSAY OF PHOSPHORUS: CPT | Performed by: NURSE PRACTITIONER

## 2022-03-31 PROCEDURE — 86901 BLOOD TYPING SEROLOGIC RH(D): CPT

## 2022-03-31 PROCEDURE — 85610 PROTHROMBIN TIME: CPT | Performed by: NURSE PRACTITIONER

## 2022-03-31 PROCEDURE — 86850 RBC ANTIBODY SCREEN: CPT | Performed by: NURSE PRACTITIONER

## 2022-03-31 PROCEDURE — 88304 TISSUE EXAM BY PATHOLOGIST: CPT | Performed by: THORACIC SURGERY (CARDIOTHORACIC VASCULAR SURGERY)

## 2022-03-31 PROCEDURE — 86922 COMPATIBILITY TEST ANTIGLOB: CPT

## 2022-03-31 PROCEDURE — 25010000002 FENTANYL CITRATE (PF) 50 MCG/ML SOLUTION: Performed by: ANESTHESIOLOGY

## 2022-03-31 PROCEDURE — 83735 ASSAY OF MAGNESIUM: CPT | Performed by: NURSE PRACTITIONER

## 2022-03-31 PROCEDURE — 43135 REMOVAL OF ESOPHAGUS POUCH: CPT | Performed by: THORACIC SURGERY (CARDIOTHORACIC VASCULAR SURGERY)

## 2022-03-31 PROCEDURE — 86920 COMPATIBILITY TEST SPIN: CPT

## 2022-03-31 PROCEDURE — 86902 BLOOD TYPE ANTIGEN DONOR EA: CPT

## 2022-03-31 PROCEDURE — 3E0336Z INTRODUCTION OF NUTRITIONAL SUBSTANCE INTO PERIPHERAL VEIN, PERCUTANEOUS APPROACH: ICD-10-PCS | Performed by: THORACIC SURGERY (CARDIOTHORACIC VASCULAR SURGERY)

## 2022-03-31 PROCEDURE — 25010000002 DEXAMETHASONE PER 1 MG: Performed by: ANESTHESIOLOGY

## 2022-03-31 PROCEDURE — 25010000002 CALCIUM GLUCONATE PER 10 ML: Performed by: INTERNAL MEDICINE

## 2022-03-31 PROCEDURE — 25010000002 MAGNESIUM SULFATE PER 500 MG OF MAGNESIUM: Performed by: INTERNAL MEDICINE

## 2022-03-31 PROCEDURE — 43135 REMOVAL OF ESOPHAGUS POUCH: CPT | Performed by: REGISTERED NURSE

## 2022-03-31 PROCEDURE — 86901 BLOOD TYPING SEROLOGIC RH(D): CPT | Performed by: NURSE PRACTITIONER

## 2022-03-31 PROCEDURE — 25010000002 POTASSIUM CHLORIDE PER 2 MEQ OF POTASSIUM: Performed by: INTERNAL MEDICINE

## 2022-03-31 PROCEDURE — 85730 THROMBOPLASTIN TIME PARTIAL: CPT | Performed by: NURSE PRACTITIONER

## 2022-03-31 PROCEDURE — 86905 BLOOD TYPING RBC ANTIGENS: CPT | Performed by: NURSE PRACTITIONER

## 2022-03-31 PROCEDURE — 86870 RBC ANTIBODY IDENTIFICATION: CPT | Performed by: NURSE PRACTITIONER

## 2022-03-31 PROCEDURE — 82962 GLUCOSE BLOOD TEST: CPT

## 2022-03-31 PROCEDURE — 80053 COMPREHEN METABOLIC PANEL: CPT | Performed by: NURSE PRACTITIONER

## 2022-03-31 PROCEDURE — 86900 BLOOD TYPING SEROLOGIC ABO: CPT

## 2022-03-31 PROCEDURE — 0DJ08ZZ INSPECTION OF UPPER INTESTINAL TRACT, VIA NATURAL OR ARTIFICIAL OPENING ENDOSCOPIC: ICD-10-PCS | Performed by: THORACIC SURGERY (CARDIOTHORACIC VASCULAR SURGERY)

## 2022-03-31 PROCEDURE — 0DB18ZZ EXCISION OF UPPER ESOPHAGUS, VIA NATURAL OR ARTIFICIAL OPENING ENDOSCOPIC: ICD-10-PCS | Performed by: THORACIC SURGERY (CARDIOTHORACIC VASCULAR SURGERY)

## 2022-03-31 PROCEDURE — 02HV33Z INSERTION OF INFUSION DEVICE INTO SUPERIOR VENA CAVA, PERCUTANEOUS APPROACH: ICD-10-PCS | Performed by: THORACIC SURGERY (CARDIOTHORACIC VASCULAR SURGERY)

## 2022-03-31 PROCEDURE — 25010000002 PROPOFOL 10 MG/ML EMULSION: Performed by: ANESTHESIOLOGY

## 2022-03-31 PROCEDURE — 86900 BLOOD TYPING SEROLOGIC ABO: CPT | Performed by: NURSE PRACTITIONER

## 2022-03-31 PROCEDURE — 25010000002 ONDANSETRON PER 1 MG: Performed by: ANESTHESIOLOGY

## 2022-03-31 PROCEDURE — 85025 COMPLETE CBC W/AUTO DIFF WBC: CPT | Performed by: INTERNAL MEDICINE

## 2022-03-31 DEVICE — PROXIMATE RELOADABLE LINEAR STAPLER, 30MM
Type: IMPLANTABLE DEVICE | Site: ESOPHAGUS | Status: FUNCTIONAL
Brand: PROXIMATE

## 2022-03-31 RX ORDER — CLINDAMYCIN PHOSPHATE 600 MG/50ML
600 INJECTION INTRAVENOUS EVERY 8 HOURS
Status: COMPLETED | OUTPATIENT
Start: 2022-03-31 | End: 2022-04-01

## 2022-03-31 RX ORDER — MAGNESIUM SULFATE HEPTAHYDRATE 500 MG/ML
INJECTION, SOLUTION INTRAMUSCULAR; INTRAVENOUS AS NEEDED
Status: DISCONTINUED | OUTPATIENT
Start: 2022-03-31 | End: 2022-03-31 | Stop reason: SURG

## 2022-03-31 RX ORDER — DIPHENHYDRAMINE HCL 25 MG
25 CAPSULE ORAL
Status: DISCONTINUED | OUTPATIENT
Start: 2022-03-31 | End: 2022-03-31 | Stop reason: HOSPADM

## 2022-03-31 RX ORDER — IBUPROFEN 600 MG/1
600 TABLET ORAL ONCE AS NEEDED
Status: DISCONTINUED | OUTPATIENT
Start: 2022-03-31 | End: 2022-03-31 | Stop reason: HOSPADM

## 2022-03-31 RX ORDER — FENTANYL CITRATE 50 UG/ML
50 INJECTION, SOLUTION INTRAMUSCULAR; INTRAVENOUS
Status: DISCONTINUED | OUTPATIENT
Start: 2022-03-31 | End: 2022-03-31 | Stop reason: HOSPADM

## 2022-03-31 RX ORDER — ONDANSETRON 2 MG/ML
INJECTION INTRAMUSCULAR; INTRAVENOUS AS NEEDED
Status: DISCONTINUED | OUTPATIENT
Start: 2022-03-31 | End: 2022-03-31 | Stop reason: SURG

## 2022-03-31 RX ORDER — OXYCODONE AND ACETAMINOPHEN 7.5; 325 MG/1; MG/1
1 TABLET ORAL EVERY 4 HOURS PRN
Status: DISCONTINUED | OUTPATIENT
Start: 2022-03-31 | End: 2022-03-31 | Stop reason: HOSPADM

## 2022-03-31 RX ORDER — SODIUM CHLORIDE 0.9 % (FLUSH) 0.9 %
10 SYRINGE (ML) INJECTION AS NEEDED
Status: DISCONTINUED | OUTPATIENT
Start: 2022-03-31 | End: 2022-04-04 | Stop reason: HOSPADM

## 2022-03-31 RX ORDER — DEXAMETHASONE SODIUM PHOSPHATE 10 MG/ML
INJECTION INTRAMUSCULAR; INTRAVENOUS AS NEEDED
Status: DISCONTINUED | OUTPATIENT
Start: 2022-03-31 | End: 2022-03-31 | Stop reason: SURG

## 2022-03-31 RX ORDER — ROCURONIUM BROMIDE 10 MG/ML
INJECTION, SOLUTION INTRAVENOUS AS NEEDED
Status: DISCONTINUED | OUTPATIENT
Start: 2022-03-31 | End: 2022-03-31 | Stop reason: SURG

## 2022-03-31 RX ORDER — SODIUM CHLORIDE 0.9 % (FLUSH) 0.9 %
3 SYRINGE (ML) INJECTION EVERY 12 HOURS SCHEDULED
Status: DISCONTINUED | OUTPATIENT
Start: 2022-03-31 | End: 2022-03-31 | Stop reason: HOSPADM

## 2022-03-31 RX ORDER — DEXMEDETOMIDINE HYDROCHLORIDE 100 UG/ML
INJECTION, SOLUTION INTRAVENOUS AS NEEDED
Status: DISCONTINUED | OUTPATIENT
Start: 2022-03-31 | End: 2022-03-31 | Stop reason: SURG

## 2022-03-31 RX ORDER — NALOXONE HCL 0.4 MG/ML
0.2 VIAL (ML) INJECTION AS NEEDED
Status: DISCONTINUED | OUTPATIENT
Start: 2022-03-31 | End: 2022-03-31 | Stop reason: HOSPADM

## 2022-03-31 RX ORDER — PROMETHAZINE HYDROCHLORIDE 25 MG/1
25 TABLET ORAL ONCE AS NEEDED
Status: DISCONTINUED | OUTPATIENT
Start: 2022-03-31 | End: 2022-03-31 | Stop reason: HOSPADM

## 2022-03-31 RX ORDER — HYDROMORPHONE HYDROCHLORIDE 1 MG/ML
0.5 INJECTION, SOLUTION INTRAMUSCULAR; INTRAVENOUS; SUBCUTANEOUS
Status: DISCONTINUED | OUTPATIENT
Start: 2022-03-31 | End: 2022-04-04 | Stop reason: HOSPADM

## 2022-03-31 RX ORDER — SODIUM CHLORIDE 9 MG/ML
75 INJECTION, SOLUTION INTRAVENOUS CONTINUOUS
Status: DISCONTINUED | OUTPATIENT
Start: 2022-03-31 | End: 2022-03-31

## 2022-03-31 RX ORDER — SODIUM CHLORIDE 0.9 % (FLUSH) 0.9 %
10 SYRINGE (ML) INJECTION EVERY 12 HOURS SCHEDULED
Status: DISCONTINUED | OUTPATIENT
Start: 2022-03-31 | End: 2022-04-04 | Stop reason: HOSPADM

## 2022-03-31 RX ORDER — ONDANSETRON 2 MG/ML
4 INJECTION INTRAMUSCULAR; INTRAVENOUS ONCE AS NEEDED
Status: DISCONTINUED | OUTPATIENT
Start: 2022-03-31 | End: 2022-03-31 | Stop reason: HOSPADM

## 2022-03-31 RX ORDER — MAGNESIUM HYDROXIDE 1200 MG/15ML
LIQUID ORAL AS NEEDED
Status: DISCONTINUED | OUTPATIENT
Start: 2022-03-31 | End: 2022-03-31 | Stop reason: HOSPADM

## 2022-03-31 RX ORDER — PROPOFOL 10 MG/ML
VIAL (ML) INTRAVENOUS AS NEEDED
Status: DISCONTINUED | OUTPATIENT
Start: 2022-03-31 | End: 2022-03-31 | Stop reason: SURG

## 2022-03-31 RX ORDER — FAMOTIDINE 10 MG/ML
20 INJECTION, SOLUTION INTRAVENOUS ONCE
Status: COMPLETED | OUTPATIENT
Start: 2022-03-31 | End: 2022-03-31

## 2022-03-31 RX ORDER — LIDOCAINE HYDROCHLORIDE 10 MG/ML
0.5 INJECTION, SOLUTION EPIDURAL; INFILTRATION; INTRACAUDAL; PERINEURAL ONCE AS NEEDED
Status: DISCONTINUED | OUTPATIENT
Start: 2022-03-31 | End: 2022-03-31 | Stop reason: HOSPADM

## 2022-03-31 RX ORDER — PROMETHAZINE HYDROCHLORIDE 25 MG/1
25 SUPPOSITORY RECTAL ONCE AS NEEDED
Status: DISCONTINUED | OUTPATIENT
Start: 2022-03-31 | End: 2022-03-31 | Stop reason: HOSPADM

## 2022-03-31 RX ORDER — MIDAZOLAM HYDROCHLORIDE 1 MG/ML
0.5 INJECTION INTRAMUSCULAR; INTRAVENOUS
Status: DISCONTINUED | OUTPATIENT
Start: 2022-03-31 | End: 2022-03-31 | Stop reason: HOSPADM

## 2022-03-31 RX ORDER — HYDRALAZINE HYDROCHLORIDE 20 MG/ML
5 INJECTION INTRAMUSCULAR; INTRAVENOUS
Status: DISCONTINUED | OUTPATIENT
Start: 2022-03-31 | End: 2022-03-31 | Stop reason: HOSPADM

## 2022-03-31 RX ORDER — HYDROMORPHONE HYDROCHLORIDE 1 MG/ML
0.5 INJECTION, SOLUTION INTRAMUSCULAR; INTRAVENOUS; SUBCUTANEOUS
Status: DISCONTINUED | OUTPATIENT
Start: 2022-03-31 | End: 2022-03-31 | Stop reason: HOSPADM

## 2022-03-31 RX ORDER — LIDOCAINE HYDROCHLORIDE 20 MG/ML
INJECTION, SOLUTION INFILTRATION; PERINEURAL AS NEEDED
Status: DISCONTINUED | OUTPATIENT
Start: 2022-03-31 | End: 2022-03-31 | Stop reason: SURG

## 2022-03-31 RX ORDER — LABETALOL HYDROCHLORIDE 5 MG/ML
5 INJECTION, SOLUTION INTRAVENOUS
Status: DISCONTINUED | OUTPATIENT
Start: 2022-03-31 | End: 2022-03-31 | Stop reason: HOSPADM

## 2022-03-31 RX ORDER — FLUMAZENIL 0.1 MG/ML
0.2 INJECTION INTRAVENOUS AS NEEDED
Status: DISCONTINUED | OUTPATIENT
Start: 2022-03-31 | End: 2022-03-31 | Stop reason: HOSPADM

## 2022-03-31 RX ORDER — SODIUM CHLORIDE, SODIUM LACTATE, POTASSIUM CHLORIDE, CALCIUM CHLORIDE 600; 310; 30; 20 MG/100ML; MG/100ML; MG/100ML; MG/100ML
9 INJECTION, SOLUTION INTRAVENOUS CONTINUOUS
Status: DISCONTINUED | OUTPATIENT
Start: 2022-03-31 | End: 2022-04-04 | Stop reason: HOSPADM

## 2022-03-31 RX ORDER — CLINDAMYCIN PHOSPHATE 900 MG/50ML
900 INJECTION INTRAVENOUS ONCE
Status: COMPLETED | OUTPATIENT
Start: 2022-03-31 | End: 2022-03-31

## 2022-03-31 RX ORDER — DIPHENHYDRAMINE HYDROCHLORIDE 50 MG/ML
12.5 INJECTION INTRAMUSCULAR; INTRAVENOUS
Status: DISCONTINUED | OUTPATIENT
Start: 2022-03-31 | End: 2022-03-31 | Stop reason: HOSPADM

## 2022-03-31 RX ORDER — HYDROCODONE BITARTRATE AND ACETAMINOPHEN 7.5; 325 MG/1; MG/1
1 TABLET ORAL ONCE AS NEEDED
Status: DISCONTINUED | OUTPATIENT
Start: 2022-03-31 | End: 2022-03-31 | Stop reason: HOSPADM

## 2022-03-31 RX ORDER — SODIUM CHLORIDE 0.9 % (FLUSH) 0.9 %
3-10 SYRINGE (ML) INJECTION AS NEEDED
Status: DISCONTINUED | OUTPATIENT
Start: 2022-03-31 | End: 2022-03-31 | Stop reason: HOSPADM

## 2022-03-31 RX ORDER — FENTANYL CITRATE 50 UG/ML
INJECTION, SOLUTION INTRAMUSCULAR; INTRAVENOUS AS NEEDED
Status: DISCONTINUED | OUTPATIENT
Start: 2022-03-31 | End: 2022-03-31 | Stop reason: SURG

## 2022-03-31 RX ORDER — EPHEDRINE SULFATE 50 MG/ML
5 INJECTION, SOLUTION INTRAVENOUS ONCE AS NEEDED
Status: DISCONTINUED | OUTPATIENT
Start: 2022-03-31 | End: 2022-03-31 | Stop reason: HOSPADM

## 2022-03-31 RX ADMIN — CLINDAMYCIN IN 5 PERCENT DEXTROSE 600 MG: 12 INJECTION, SOLUTION INTRAVENOUS at 23:17

## 2022-03-31 RX ADMIN — SODIUM CHLORIDE 75 ML/HR: 9 INJECTION, SOLUTION INTRAVENOUS at 20:49

## 2022-03-31 RX ADMIN — SODIUM CHLORIDE, POTASSIUM CHLORIDE, SODIUM LACTATE AND CALCIUM CHLORIDE 9 ML/HR: 600; 310; 30; 20 INJECTION, SOLUTION INTRAVENOUS at 14:45

## 2022-03-31 RX ADMIN — FENTANYL CITRATE 25 MCG: 0.05 INJECTION, SOLUTION INTRAMUSCULAR; INTRAVENOUS at 17:04

## 2022-03-31 RX ADMIN — PANTOPRAZOLE SODIUM 40 MG: 40 INJECTION, POWDER, FOR SOLUTION INTRAVENOUS at 06:06

## 2022-03-31 RX ADMIN — ROCURONIUM BROMIDE 5 MG: 50 INJECTION INTRAVENOUS at 16:10

## 2022-03-31 RX ADMIN — PROPOFOL 40 MG: 10 INJECTION, EMULSION INTRAVENOUS at 14:57

## 2022-03-31 RX ADMIN — FENTANYL CITRATE 25 MCG: 0.05 INJECTION, SOLUTION INTRAMUSCULAR; INTRAVENOUS at 16:21

## 2022-03-31 RX ADMIN — FENTANYL CITRATE 25 MCG: 0.05 INJECTION, SOLUTION INTRAMUSCULAR; INTRAVENOUS at 16:46

## 2022-03-31 RX ADMIN — ROCURONIUM BROMIDE 30 MG: 50 INJECTION INTRAVENOUS at 14:57

## 2022-03-31 RX ADMIN — SODIUM ACETATE: 164 INJECTION, SOLUTION, CONCENTRATE INTRAVENOUS at 20:48

## 2022-03-31 RX ADMIN — PROPOFOL 10 MG: 10 INJECTION, EMULSION INTRAVENOUS at 15:00

## 2022-03-31 RX ADMIN — LIDOCAINE HYDROCHLORIDE 60 MG: 20 INJECTION, SOLUTION INFILTRATION; PERINEURAL at 17:50

## 2022-03-31 RX ADMIN — Medication 10 ML: at 21:00

## 2022-03-31 RX ADMIN — Medication 10 ML: at 08:36

## 2022-03-31 RX ADMIN — Medication 20 ML: at 21:03

## 2022-03-31 RX ADMIN — SUGAMMADEX 100 MG: 100 INJECTION, SOLUTION INTRAVENOUS at 17:49

## 2022-03-31 RX ADMIN — LIDOCAINE HYDROCHLORIDE 60 MG: 20 INJECTION, SOLUTION INFILTRATION; PERINEURAL at 14:57

## 2022-03-31 RX ADMIN — ONDANSETRON 4 MG: 2 INJECTION INTRAMUSCULAR; INTRAVENOUS at 17:32

## 2022-03-31 RX ADMIN — FENTANYL CITRATE 50 MCG: 50 INJECTION INTRAMUSCULAR; INTRAVENOUS at 18:29

## 2022-03-31 RX ADMIN — Medication 10 ML: at 20:59

## 2022-03-31 RX ADMIN — CLINDAMYCIN PHOSPHATE 900 MG: 900 INJECTION, SOLUTION INTRAVENOUS at 14:44

## 2022-03-31 RX ADMIN — DEXMEDETOMIDINE 6 MCG: 100 INJECTION, SOLUTION, CONCENTRATE INTRAVENOUS at 17:48

## 2022-03-31 RX ADMIN — PROPOFOL 20 MG: 10 INJECTION, EMULSION INTRAVENOUS at 15:01

## 2022-03-31 RX ADMIN — FAMOTIDINE 20 MG: 10 INJECTION INTRAVENOUS at 14:20

## 2022-03-31 RX ADMIN — FENTANYL CITRATE 25 MCG: 0.05 INJECTION, SOLUTION INTRAMUSCULAR; INTRAVENOUS at 16:27

## 2022-03-31 RX ADMIN — DEXAMETHASONE SODIUM PHOSPHATE 4 MG: 10 INJECTION INTRAMUSCULAR; INTRAVENOUS at 15:14

## 2022-03-31 RX ADMIN — Medication 10 ML: at 21:01

## 2022-03-31 RX ADMIN — MAGNESIUM SULFATE HEPTAHYDRATE 1 G: 500 INJECTION, SOLUTION INTRAMUSCULAR; INTRAVENOUS at 15:14

## 2022-03-31 NOTE — ANESTHESIA PROCEDURE NOTES
Airway  Date/Time: 3/31/2022 3:02 PM  Airway not difficult    General Information and Staff    Anesthesiologist: Brooks Umaña MD    Indications and Patient Condition    Preoxygenated: yes  Mask difficulty assessment: 1 - vent by mask    Final Airway Details  Final airway type: endotracheal airway      Successful airway: ETT  Cuffed: yes   Successful intubation technique: direct laryngoscopy  Endotracheal tube insertion site: oral  Blade: Bueno  Blade size: 2  ETT size (mm): 7.0  Cormack-Lehane Classification: grade I - full view of glottis  Placement verified by: chest auscultation and capnometry   Measured from: teeth  ETT/EBT  to teeth (cm): 21  Assessment: lips, teeth, and gum same as pre-op and atraumatic intubation

## 2022-03-31 NOTE — ANESTHESIA PREPROCEDURE EVALUATION
Anesthesia Evaluation     Patient summary reviewed and Nursing notes reviewed   no history of anesthetic complications:  NPO Solid Status: > 8 hours  NPO Liquid Status: > 8 hours           Airway   Mallampati: II  Dental - normal exam     Pulmonary - normal exam   (+) pneumonia resolved , shortness of breath,   Cardiovascular     (+) valvular problems/murmurs AS and MR, CHF , SPENCER, murmur,     ROS comment: CHIVO .82cm2  18mmHg valve gradient    Neuro/Psych- negative ROS  GI/Hepatic/Renal/Endo    (+)  GERD,      Musculoskeletal     Abdominal    Substance History      OB/GYN          Other                      Anesthesia Plan    ASA 3     general     intravenous induction     Anesthetic plan, all risks, benefits, and alternatives have been provided, discussed and informed consent has been obtained with: patient.        CODE STATUS:    Code Status (Patient has no pulse and is not breathing): CPR (Attempt to Resuscitate)  Medical Interventions (Patient has pulse or is breathing): Full

## 2022-03-31 NOTE — ANESTHESIA POSTPROCEDURE EVALUATION
Patient: Shaina Mcknight    Procedure Summary     Date: 03/31/22 Room / Location: [facility] OR  / [facility] MAIN OR    Anesthesia Start: 1450 Anesthesia Stop: 1806    Procedure: ZENKERS DIVERTICULECTOMY WITH MYOTOMY, ESOPHAGOSCOPY (N/A ) Diagnosis:       Zenker's diverticulum      (Zenker's diverticulum [K22.5])    Surgeons: Chilango Louis III, MD Provider: Brooks Umaña MD    Anesthesia Type: general ASA Status: 3          Anesthesia Type: general    Vitals  Vitals Value Taken Time   /65 03/31/22 1931   Temp 37 °C (98.6 °F) 03/31/22 1805   Pulse 78 03/31/22 1937   Resp 14 03/31/22 1930   SpO2 89 % 03/31/22 1937   Vitals shown include unvalidated device data.        Post Anesthesia Care and Evaluation    Patient location during evaluation: bedside  Patient participation: complete - patient participated  Level of consciousness: awake and alert  Pain management: adequate  Airway patency: patent  Anesthetic complications: No anesthetic complications  PONV Status: controlled  Cardiovascular status: acceptable  Respiratory status: acceptable  Hydration status: acceptable

## 2022-04-01 ENCOUNTER — TELEPHONE (OUTPATIENT)
Dept: SURGERY | Facility: CLINIC | Age: 87
End: 2022-04-01

## 2022-04-01 ENCOUNTER — APPOINTMENT (OUTPATIENT)
Dept: GENERAL RADIOLOGY | Facility: HOSPITAL | Age: 87
End: 2022-04-01

## 2022-04-01 PROBLEM — R73.9 HYPERGLYCEMIA: Status: ACTIVE | Noted: 2022-04-01

## 2022-04-01 PROBLEM — R03.0 ELEVATED BLOOD PRESSURE READING WITHOUT DIAGNOSIS OF HYPERTENSION: Status: ACTIVE | Noted: 2022-04-01

## 2022-04-01 LAB
ALBUMIN SERPL-MCNC: 2.6 G/DL (ref 3.5–5.2)
ALBUMIN/GLOB SERPL: 0.7 G/DL
ALP SERPL-CCNC: 82 U/L (ref 39–117)
ALT SERPL W P-5'-P-CCNC: 10 U/L (ref 1–33)
ANION GAP SERPL CALCULATED.3IONS-SCNC: 9.7 MMOL/L (ref 5–15)
AST SERPL-CCNC: 15 U/L (ref 1–32)
BASOPHILS # BLD AUTO: 0.01 10*3/MM3 (ref 0–0.2)
BASOPHILS NFR BLD AUTO: 0.1 % (ref 0–1.5)
BILIRUB SERPL-MCNC: 0.2 MG/DL (ref 0–1.2)
BUN SERPL-MCNC: 16 MG/DL (ref 8–23)
BUN/CREAT SERPL: 19.5 (ref 7–25)
CALCIUM SPEC-SCNC: 8.3 MG/DL (ref 8.2–9.6)
CHLORIDE SERPL-SCNC: 96 MMOL/L (ref 98–107)
CO2 SERPL-SCNC: 26.3 MMOL/L (ref 22–29)
CREAT SERPL-MCNC: 0.82 MG/DL (ref 0.57–1)
DEPRECATED RDW RBC AUTO: 37.3 FL (ref 37–54)
EGFRCR SERPLBLD CKD-EPI 2021: 66.4 ML/MIN/1.73
EOSINOPHIL # BLD AUTO: 0 10*3/MM3 (ref 0–0.4)
EOSINOPHIL NFR BLD AUTO: 0 % (ref 0.3–6.2)
ERYTHROCYTE [DISTWIDTH] IN BLOOD BY AUTOMATED COUNT: 11.8 % (ref 12.3–15.4)
GLOBULIN UR ELPH-MCNC: 3.6 GM/DL
GLUCOSE BLDC GLUCOMTR-MCNC: 157 MG/DL (ref 70–130)
GLUCOSE BLDC GLUCOMTR-MCNC: 170 MG/DL (ref 70–130)
GLUCOSE BLDC GLUCOMTR-MCNC: 177 MG/DL (ref 70–130)
GLUCOSE BLDC GLUCOMTR-MCNC: 314 MG/DL (ref 70–130)
GLUCOSE BLDC GLUCOMTR-MCNC: 320 MG/DL (ref 70–130)
GLUCOSE BLDC GLUCOMTR-MCNC: 343 MG/DL (ref 70–130)
GLUCOSE SERPL-MCNC: 323 MG/DL (ref 65–99)
HCT VFR BLD AUTO: 28.9 % (ref 34–46.6)
HGB BLD-MCNC: 9.7 G/DL (ref 12–15.9)
IMM GRANULOCYTES # BLD AUTO: 0.03 10*3/MM3 (ref 0–0.05)
IMM GRANULOCYTES NFR BLD AUTO: 0.3 % (ref 0–0.5)
LYMPHOCYTES # BLD AUTO: 0.69 10*3/MM3 (ref 0.7–3.1)
LYMPHOCYTES NFR BLD AUTO: 7.4 % (ref 19.6–45.3)
MAGNESIUM SERPL-MCNC: 2 MG/DL (ref 1.7–2.3)
MCH RBC QN AUTO: 29 PG (ref 26.6–33)
MCHC RBC AUTO-ENTMCNC: 33.6 G/DL (ref 31.5–35.7)
MCV RBC AUTO: 86.3 FL (ref 79–97)
MONOCYTES # BLD AUTO: 0.58 10*3/MM3 (ref 0.1–0.9)
MONOCYTES NFR BLD AUTO: 6.2 % (ref 5–12)
NEUTROPHILS NFR BLD AUTO: 8.02 10*3/MM3 (ref 1.7–7)
NEUTROPHILS NFR BLD AUTO: 86 % (ref 42.7–76)
NRBC BLD AUTO-RTO: 0 /100 WBC (ref 0–0.2)
PHOSPHATE SERPL-MCNC: 3 MG/DL (ref 2.5–4.5)
PLATELET # BLD AUTO: 247 10*3/MM3 (ref 140–450)
PMV BLD AUTO: 9.7 FL (ref 6–12)
POTASSIUM SERPL-SCNC: 4.4 MMOL/L (ref 3.5–5.2)
PROT SERPL-MCNC: 6.2 G/DL (ref 6–8.5)
RBC # BLD AUTO: 3.35 10*6/MM3 (ref 3.77–5.28)
SODIUM SERPL-SCNC: 132 MMOL/L (ref 136–145)
WBC NRBC COR # BLD: 9.33 10*3/MM3 (ref 3.4–10.8)

## 2022-04-01 PROCEDURE — 99232 SBSQ HOSP IP/OBS MODERATE 35: CPT | Performed by: NURSE PRACTITIONER

## 2022-04-01 PROCEDURE — 82962 GLUCOSE BLOOD TEST: CPT

## 2022-04-01 PROCEDURE — 85025 COMPLETE CBC W/AUTO DIFF WBC: CPT | Performed by: THORACIC SURGERY (CARDIOTHORACIC VASCULAR SURGERY)

## 2022-04-01 PROCEDURE — 80053 COMPREHEN METABOLIC PANEL: CPT | Performed by: THORACIC SURGERY (CARDIOTHORACIC VASCULAR SURGERY)

## 2022-04-01 PROCEDURE — 25010000002 POTASSIUM CHLORIDE PER 2 MEQ OF POTASSIUM: Performed by: THORACIC SURGERY (CARDIOTHORACIC VASCULAR SURGERY)

## 2022-04-01 PROCEDURE — 97530 THERAPEUTIC ACTIVITIES: CPT | Performed by: PHYSICAL THERAPIST

## 2022-04-01 PROCEDURE — 63710000001 INSULIN LISPRO (HUMAN) PER 5 UNITS: Performed by: INTERNAL MEDICINE

## 2022-04-01 PROCEDURE — 63710000001 INSULIN LISPRO (HUMAN) PER 5 UNITS: Performed by: NURSE PRACTITIONER

## 2022-04-01 PROCEDURE — 25010000002 MAGNESIUM SULFATE PER 500 MG OF MAGNESIUM: Performed by: THORACIC SURGERY (CARDIOTHORACIC VASCULAR SURGERY)

## 2022-04-01 PROCEDURE — 74220 X-RAY XM ESOPHAGUS 1CNTRST: CPT

## 2022-04-01 PROCEDURE — 83735 ASSAY OF MAGNESIUM: CPT | Performed by: THORACIC SURGERY (CARDIOTHORACIC VASCULAR SURGERY)

## 2022-04-01 PROCEDURE — 25010000002 CALCIUM GLUCONATE PER 10 ML: Performed by: THORACIC SURGERY (CARDIOTHORACIC VASCULAR SURGERY)

## 2022-04-01 PROCEDURE — 99024 POSTOP FOLLOW-UP VISIT: CPT | Performed by: NURSE PRACTITIONER

## 2022-04-01 PROCEDURE — 84100 ASSAY OF PHOSPHORUS: CPT | Performed by: THORACIC SURGERY (CARDIOTHORACIC VASCULAR SURGERY)

## 2022-04-01 PROCEDURE — 25010000002 ENOXAPARIN PER 10 MG: Performed by: THORACIC SURGERY (CARDIOTHORACIC VASCULAR SURGERY)

## 2022-04-01 RX ORDER — INSULIN LISPRO 100 [IU]/ML
0-7 INJECTION, SOLUTION INTRAVENOUS; SUBCUTANEOUS EVERY 6 HOURS
Status: DISCONTINUED | OUTPATIENT
Start: 2022-04-01 | End: 2022-04-01

## 2022-04-01 RX ORDER — INSULIN LISPRO 100 [IU]/ML
0-7 INJECTION, SOLUTION INTRAVENOUS; SUBCUTANEOUS
Status: DISCONTINUED | OUTPATIENT
Start: 2022-04-01 | End: 2022-04-04 | Stop reason: HOSPADM

## 2022-04-01 RX ORDER — DEXTROSE MONOHYDRATE 25 G/50ML
25 INJECTION, SOLUTION INTRAVENOUS
Status: DISCONTINUED | OUTPATIENT
Start: 2022-04-01 | End: 2022-04-01 | Stop reason: SDUPTHER

## 2022-04-01 RX ADMIN — INSULIN LISPRO 2 UNITS: 100 INJECTION, SOLUTION INTRAVENOUS; SUBCUTANEOUS at 17:32

## 2022-04-01 RX ADMIN — INSULIN LISPRO 5 UNITS: 100 INJECTION, SOLUTION INTRAVENOUS; SUBCUTANEOUS at 02:07

## 2022-04-01 RX ADMIN — Medication 10 ML: at 20:23

## 2022-04-01 RX ADMIN — Medication 10 ML: at 08:56

## 2022-04-01 RX ADMIN — Medication 10 ML: at 08:57

## 2022-04-01 RX ADMIN — PANTOPRAZOLE SODIUM 40 MG: 40 INJECTION, POWDER, FOR SOLUTION INTRAVENOUS at 05:13

## 2022-04-01 RX ADMIN — I.V. FAT EMULSION 20 G: 20 EMULSION INTRAVENOUS at 17:20

## 2022-04-01 RX ADMIN — INSULIN LISPRO 2 UNITS: 100 INJECTION, SOLUTION INTRAVENOUS; SUBCUTANEOUS at 11:38

## 2022-04-01 RX ADMIN — INSULIN LISPRO 5 UNITS: 100 INJECTION, SOLUTION INTRAVENOUS; SUBCUTANEOUS at 08:55

## 2022-04-01 RX ADMIN — ENOXAPARIN SODIUM 40 MG: 100 INJECTION SUBCUTANEOUS at 08:55

## 2022-04-01 RX ADMIN — CLINDAMYCIN IN 5 PERCENT DEXTROSE 600 MG: 12 INJECTION, SOLUTION INTRAVENOUS at 05:13

## 2022-04-01 RX ADMIN — SODIUM ACETATE: 164 INJECTION, SOLUTION, CONCENTRATE INTRAVENOUS at 17:16

## 2022-04-01 NOTE — PLAN OF CARE
Goal Outcome Evaluation:  Plan of Care Reviewed With: patient        Progress: no change  Outcome Evaluation: s/p zenkers diverticulectomy. left neck incision CDI, open to air with dermabond. Denies pain. Family at bedside. ANH to left neck, small amount of drainage.

## 2022-04-01 NOTE — PROGRESS NOTES
"    Patient Name: Shaina Mcknight  :10/4/1927  89 y.o.      Patient Care Team:  Fanta Shankar MD as PCP - General  Fanta Shankar MD as PCP - Family Medicine    Chief Complaint: follow up aortic stenosis, Zenker's diverticulum    Interval History: 's last night. No SOA or heart racing. No CP. Coughing some stuff up and using suction. Daughter at bedside.        Objective   Vital Signs  Temp:  [97 °F (36.1 °C)-99 °F (37.2 °C)] 97 °F (36.1 °C)  Heart Rate:  [68-83] 68  Resp:  [14-20] 18  BP: (137-177)/(59-99) 157/67    Intake/Output Summary (Last 24 hours) at 2022 1011  Last data filed at 2022 0513  Gross per 24 hour   Intake 200 ml   Output 900 ml   Net -700 ml     Flowsheet Rows    Flowsheet Row First Filed Value   Admission Height 152.4 cm (60\") Documented at 2022 1843   Admission Weight 59 kg (130 lb) Documented at 2022 1843          Physical Exam:   General Appearance:    Alert, cooperative, in no acute distress   Lungs:     Clear to auscultation.  Normal respiratory effort and rate.      Heart:    Regular rhythm and normal rate, normal S1 and S2, no murmurs, gallops or rubs.     Chest Wall:    No abnormalities observed   Abdomen:     Soft, nontender, positive bowel sounds.     Extremities:   no cyanosis, clubbing or edema.  No marked joint deformities.  Adequate musculoskeletal strength.       Results Review:    Results from last 7 days   Lab Units 22  0533   SODIUM mmol/L 132*   POTASSIUM mmol/L 4.4   CHLORIDE mmol/L 96*   CO2 mmol/L 26.3   BUN mg/dL 16   CREATININE mg/dL 0.82   GLUCOSE mg/dL 323*   CALCIUM mg/dL 8.3         Results from last 7 days   Lab Units 22  0533   WBC 10*3/mm3 9.33   HEMOGLOBIN g/dL 9.7*   HEMATOCRIT % 28.9*   PLATELETS 10*3/mm3 247     Results from last 7 days   Lab Units 22  0526   INR  1.32*   APTT seconds 41.8*     Results from last 7 days   Lab Units 22  0533   MAGNESIUM mg/dL 2.0     Results from last 7 days   Lab " Units 03/29/22  0755   TRIGLYCERIDES mg/dL 102               Medication Review:   aspirin, 81 mg, Oral, Daily  cetirizine, 5 mg, Oral, Daily  enoxaparin, 40 mg, Subcutaneous, Daily  insulin lispro, 0-7 Units, Subcutaneous, Q6H  pantoprazole, 40 mg, Intravenous, Q AM  sodium chloride, 10 mL, Intravenous, Q12H  sodium chloride, 10 mL, Intravenous, Q12H  sodium chloride, 10 mL, Intravenous, Q12H  sodium chloride, 10 mL, Intravenous, Q12H         Adult Central 2-in-1 TPN, , Last Rate: 75 mL/hr at 03/31/22 2048  lactated ringers, 9 mL/hr, Last Rate: 9 mL/hr (03/31/22 1450)  Pharmacy to Dose TPN,         Assessment/Plan   1. Zenker's diverticulum status post diverticulectomy with myotomy POD #1  2. Aortic stenosis- mild to moderate  3. carotid artery disease  4. Elevatted blood pressure, no formal diagnosis of hypertension - Normally does fine without antiHTNs as outpatient. . Would just follow clinically.     BP was elevated 170's overnight. Now better without intervention. No changes recommended at this time. Patient favors conservative management of BP and I agree. Will follow.     SONDRA Ramos  [facility]  04/01/22  10:11 EDT

## 2022-04-01 NOTE — PROGRESS NOTES
"[facility] Clinical Pharmacy Services: Total Parental Nutrition Initial Consult    Indication: inaccessible GI tract (Zenkers diverticulum- plan for Zenkers diverticulectomy with myotomy)  Route: central(PICC line placed on 3/29 however not in correct position so had to be repositioned on 3/30)  Type: standard    Day 3 (ordered on 3/29 however not started due to PICC line not in correct position)    Relevant clinical data and objective history reviewed:  89 y.o. female 152.4 cm (60\") 56.3 kg (124 lb 1.6 oz)    Results from last 7 days   Lab Units 04/01/22  0533 03/30/22  0911 03/29/22  0755   SODIUM mmol/L 132*   < > 137   POTASSIUM mmol/L 4.4   < > 4.0   CHLORIDE mmol/L 96*   < > 97*   CO2 mmol/L 26.3   < > 20.8*   BUN mg/dL 16   < > 19   CREATININE mg/dL 0.82   < > 0.88   CALCIUM mg/dL 8.3   < > 8.5   ALBUMIN g/dL 2.60*   < >  --    BILIRUBIN mg/dL 0.2   < >  --    ALK PHOS U/L 82   < >  --    ALT (SGPT) U/L 10   < >  --    AST (SGOT) U/L 15   < >  --    GLUCOSE mg/dL 323*   < > 91   MAGNESIUM mg/dL 2.0   < > 1.5*   PHOSPHORUS mg/dL 3.0   < > 4.0   TRIGLYCERIDES mg/dL  --   --  102    < > = values in this interval not displayed.        Estimated Creatinine Clearance: 34.7 mL/min (by C-G formula based on SCr of 0.82 mg/dL).    Active fluid orders: Lactated ringer at 75ml/hr (now dced)    Dietary Orders (From admission, onward)       Start     Ordered    03/31/22 0001  NPO Diet NPO Except: Sips with meds  Diet Effective Midnight        Question:  NPO Except:  Answer:  Sips with meds    03/30/22 0857    03/26/22 0001  NPO Diet  Diet Effective Midnight         03/25/22 5587                  Assessment  Ideal Body Weight: 45.5 kg  Adjusted Body Weight: 54.9 kg  Basal Energy Expenditure: 1032 kCal/Day  Daily Est. Luis: 1098- 1927 kCal/Day  Estimated Fluid Requirements: 1600 to 2200 mL/day    Goal per Dietitian recommendations   Protein: 65 grams/kg/day (1.2 grams/day)   Dextrose: 1400 Kcal/day (411 " grams/day)   Lipids: 100 ml of 20% lipid infusion 7 days/week              Total kcals: 1860 kcals              Goal rate 75ml/hr (1800mL/day)    Mg has been replaced (now in range)  Triglycerides 102   IVFs have been stopped  4.   Sodium and chloride still low but unable to add NaCL due to shortage  5.   Sliding scale admelog has been added to treat high glucose  Plan  Will taper TPN up to goals. Today's TPN with the following macros:   Protein/Dextrose/Lipids: 65g/1400kcals/ lipids starting today    Volume: 1800 ml (75 ml/hr over 24 hrs daily)      Based on the above labs, will add the following electrolytes/additives to the TPN.    Sodium Chloride: 0 mEq (on national shortage so will utilize sodium acetate and phosphate for now)   Sodium Acetate: 60 mEq   Sodium Phosphate: 0 mEq   Potassium Chloride: 50 mEq   Potassium Acetate: 0 mEq   Potassium Phosphate: 22 mEq   Calcium Gluconate: 9 mEq   Magnesium Sulfate: 5 mEq   MVI added MWF   Trace Elements    Labs to be ordered: cmp, mg,phos in am     Pharmacy will continue to follow.     Alex Han [facility]  Clinical Pharmacist

## 2022-04-01 NOTE — PROGRESS NOTES
Name: Shaina Mcknight ADMIT: 3/25/2022   : 10/4/1927  PCP: Fanta Shankar MD    MRN: [id number] LOS: 6 days   AGE/SEX: 89 y.o. female  ROOM: [facility]     Subjective   Subjective   CC: difficulty swallowing  No acute events. Patient has no new complaints. Went to OR yesterday and tolerated well. No CP/dyspnea/f/c/n/v/d. Two of her daughters are at bedside.    Objective   Objective   Vital Signs  Temp:  [97 °F (36.1 °C)-98.6 °F (37 °C)] 98 °F (36.7 °C)  Heart Rate:  [68-83] 79  Resp:  [14-20] 16  BP: (137-177)/(59-99) 169/82  SpO2:  [88 %-99 %] 98 %  on  Flow (L/min):  [2-4] 2;   Device (Oxygen Therapy): room air  Body mass index is 27.04 kg/m².  Physical Exam  Vitals and nursing note reviewed.   Constitutional:       General: She is not in acute distress.     Appearance: She is not toxic-appearing.      Comments: Elderly, frail   HENT:      Head: Normocephalic and atraumatic.      Nose: Nose normal.      Mouth/Throat:      Mouth: Mucous membranes are moist.      Pharynx: Oropharynx is clear.   Eyes:      Extraocular Movements: Extraocular movements intact.      Conjunctiva/sclera: Conjunctivae normal.      Pupils: Pupils are equal, round, and reactive to light.   Neck:      Trachea: Trachea normal.      Comments: Surgical wound c/d/i  ANH drain in place with serosanguinous drainage  Cardiovascular:      Rate and Rhythm: Normal rate and regular rhythm.      Pulses: Normal pulses.   Pulmonary:      Effort: Pulmonary effort is normal.      Breath sounds: Normal breath sounds.   Abdominal:      General: Bowel sounds are normal.      Palpations: Abdomen is soft.   Musculoskeletal:         General: Swelling (1+ BLE) present. No tenderness.   Skin:     General: Skin is warm and dry.      Capillary Refill: Capillary refill takes less than 2 seconds.   Neurological:      General: No focal deficit present.      Mental Status: She is alert and oriented to person, place, and time.   Psychiatric:         Mood and Affect:  Mood normal.         Behavior: Behavior normal.     Results Review     I reviewed the patient's new clinical results.  I reviewed the patient's telemetry.  Results from last 7 days   Lab Units 04/01/22 0533 03/31/22 0526 03/29/22 0755 03/28/22  0852   WBC 10*3/mm3 9.33 9.17 12.89* 15.40*   HEMOGLOBIN g/dL 9.7* 9.3* 10.2* 11.1*   PLATELETS 10*3/mm3 247 236 267 317     Results from last 7 days   Lab Units 04/01/22 0533 03/31/22  0526 03/30/22  0911 03/29/22  0755   SODIUM mmol/L 132* 132* 135* 137   POTASSIUM mmol/L 4.4 4.1 3.9 4.0   CHLORIDE mmol/L 96* 98 99 97*   CO2 mmol/L 26.3 27.0 20.0* 20.8*   BUN mg/dL 16 22 22 19   CREATININE mg/dL 0.82 0.71 0.94 0.88   GLUCOSE mg/dL 323* 167* 69 91   EGFR mL/min/1.73 66.4 78.9 56.3* 61.0     Results from last 7 days   Lab Units 04/01/22 0533 03/31/22  0526 03/30/22  0911 03/25/22  1845   ALBUMIN g/dL 2.60* 2.90* 3.00* 3.60   BILIRUBIN mg/dL 0.2 0.2 0.3 0.2   ALK PHOS U/L 82 77 88 104   AST (SGOT) U/L 15 17 18 17   ALT (SGPT) U/L 10 10 9 12     Results from last 7 days   Lab Units 04/01/22 0533 03/31/22  0526 03/30/22  0911 03/29/22  0755 03/26/22  0526 03/25/22  1845   CALCIUM mg/dL 8.3 8.2 8.3 8.5   < > 9.0   ALBUMIN g/dL 2.60* 2.90* 3.00*  --   --  3.60   MAGNESIUM mg/dL 2.0 2.0 2.6* 1.5*  --   --    PHOSPHORUS mg/dL 3.0 2.2* 3.1 4.0  --   --     < > = values in this interval not displayed.       Glucose   Date/Time Value Ref Range Status   04/01/2022 1127 177 (H) 70 - 130 mg/dL Final     Comment:     Meter: [id number] : [id number] Javier Mitchell NA   04/01/2022 0603 320 (H) 70 - 130 mg/dL Final     Comment:     Meter: [id number] : [id number] Irvin Freitas NA   04/01/2022 0112 314 (H) 70 - 130 mg/dL Final     Comment:     Meter: [id number] : [id number] Phill Romo RN   04/01/2022 0001 343 (H) 70 - 130 mg/dL Final     Comment:     Meter: [id number] : [id number] Beto Donaldson NA   03/31/2022 1113 142 (H) 70 - 130 mg/dL Final     Comment:      Meter: [id number] : [id number] Javier Mitchell NA   03/31/2022 0601 172 (H) 70 - 130 mg/dL Final     Comment:     Meter: [id number] : [id number] Beto Donaldson NA   03/30/2022 2239 147 (H) 70 - 130 mg/dL Final     Comment:     Meter: [id number] : [id number] Beto Donaldson NA       FL Esophagram Complete Single Contrast  FLUOROSCOPIC ESOPHAGRAM COMPLETE SINGLE CONTRAST, WATER-SOLUBLE     Clinical: Zenker's diverticulectomy on 3/31/2022, evaluate for leak     Rapid sequence imaging of the proximal esophagus performed while  swallowing Gastrografin only.     FLUOROSCOPY TIME: 1 minute 20 seconds, 167 images.     FINDINGS: The patient was able to swallow Gastrografin and imaging  performed in the frontal and lateral projections. There is a surgical  drain demonstrated on the left. Gastrografin passes freely through the  surgical site without delay or leak. No aspiration during this  examination. There was some stasis demonstrated within the distal  esophagus with intraesophageal reflux.     CONCLUSION: No leak with normal transit demonstrated at the site of  resection.     This report was finalized on 4/1/2022 8:23 AM by Dr. Carlos Alberto Duong M.D.       Scheduled Medications  aspirin, 81 mg, Oral, Daily  cetirizine, 5 mg, Oral, Daily  enoxaparin, 40 mg, Subcutaneous, Daily  Fat Emulsion Plant Based, 100 mL, Intravenous, Q24H (TPN)  insulin lispro, 0-7 Units, Subcutaneous, 4x Daily With Meals & Nightly  pantoprazole, 40 mg, Intravenous, Q AM  sodium chloride, 10 mL, Intravenous, Q12H  sodium chloride, 10 mL, Intravenous, Q12H  sodium chloride, 10 mL, Intravenous, Q12H  sodium chloride, 10 mL, Intravenous, Q12H    Infusions  Adult Central 2-in-1 TPN, , Last Rate: 75 mL/hr at 03/31/22 2048  Adult Central 2-in-1 TPN,   lactated ringers, 9 mL/hr, Last Rate: 9 mL/hr (03/31/22 1450)  Pharmacy to Dose TPN,     Diet  Adult Central 2-in-1 TPN  NPO Diet NPO Except: Ice Chips  Adult Central 2-in-1 TPN        Assessment/Plan     Active Hospital Problems    Diagnosis  POA   • **Esophageal dysphagia [R13.19]  Yes   • Hyperglycemia [R73.9]  No   • Elevated blood pressure reading without diagnosis of hypertension [R03.0]  No   • Leukocytosis [D72.829]  Yes   • 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   Zenker's Diverticulum with Esophageal Obstruction  - continue NPO until cleared by thoracic surgery  - PICC line placed, repositioned 3/30-on TPN  - appreciate thoracic surgery recs  - appreciate cardiology recs    Hyperglycemia  - likely reactive from surgery and from TPN  - continue ssi/hypoglycemia protocol coverage    High Blood Pressure  - no history of HTN  - I suspect this is more reactive and her BP is at an acceptable level now so will just monitor    SCDs for DVT prophylaxis.  Full code.  Discussed with patient, family and nursing staff.  Anticipate discharge home with HH vs SNU facility timing yet to be determined.      Jaspal Ortiz MD  [facility]  04/01/22  15:48 EDT

## 2022-04-01 NOTE — THERAPY TREATMENT NOTE
Patient Name: Shaina Mcknight  : 10/4/1927    MRN: [id number]                              Today's Date: 2022       Admit Date: 3/25/2022    Visit Dx:     ICD-10-CM ICD-9-CM   1. Esophageal dysphagia  R13.19 787.29   2. Nonrheumatic aortic valve stenosis  I35.0 424.1   3. Zenker's diverticulum  K22.5 530.6     Patient Active Problem List   Diagnosis   • TB (tuberculosis)   • Dyspnea on exertion   • Nonrheumatic aortic valve stenosis   • Community acquired pneumonia of right lower lobe of lung   • Leukocytosis   • Acute respiratory failure with hypoxia (HCC)   • Bacterial pneumonia   • Sepsis (HCC)   • Esophageal dysphagia   • Aortic valve regurgitation   • Rojas esophagus   • Anemia   • Zenker's diverticulum   • Leukocytosis   • Ketosis (HCC)     Past Medical History:   Diagnosis Date   • Acute pain of left shoulder due to trauma    • Anemia    • Aortic valve regurgitation     trace   • Aortic valve stenosis     mild   • Rojas esophagus    • Cancer (HCC)     some skin cancers   • SPENCER (dyspnea on exertion)    • Esophageal varices (HCC)    • Fall     going up the steps   • GERD (gastroesophageal reflux disease)    • Heart murmur    • Mitral valve regurgitation     mild to moderate   • Pulmonary valve regurgitation     trace   • Tricuspid valve regurgitation     mild to moderate     Past Surgical History:   Procedure Laterality Date   • CATARACT EXTRACTION Bilateral    • HYSTERECTOMY     • TONSILLECTOMY AND ADENOIDECTOMY     • ZENKERS DIVERTICULECTOMY N/A 3/31/2022    Procedure: ZENKERS DIVERTICULECTOMY WITH MYOTOMY, ESOPHAGOSCOPY;  Surgeon: Chilango Louis III, MD;  Location: [facility];  Service: Thoracic;  Laterality: N/A;      General Information     Row Name 22 1241          Physical Therapy Time and Intention    Document Type re-evaluation  -KH     Mode of Treatment individual therapy;physical therapy  -     Row Name 22 1241          General Information     Existing Precautions/Restrictions fall  -     Barriers to Rehab none identified  -[facility] Name 04/01/22 1241          Living Environment    People in Home child(mae), adult  -[facility] Name 04/01/22 1241          Cognition    Orientation Status (Cognition) oriented x 4  -[facility] Name 04/01/22 1241          Safety Issues, Functional Mobility    Impairments Affecting Function (Mobility) balance;strength;endurance/activity tolerance;pain  -           User Key  (r) = Recorded By, (t) = Taken By, (c) = Cosigned By    Initials Name Provider Type    Andreia Obregon, PT Physical Therapist               Mobility     Row Name 04/01/22 1242          Bed Mobility    Bed Mobility supine-sit  -     Supine-Sit [place] (Bed Mobility) contact guard  -     Assistive Device (Bed Mobility) bed rails;head of bed elevated  -[facility] Name 04/01/22 1242          Sit-Stand Transfer    Sit-Stand [place] (Transfers) contact guard;verbal cues  -     Assistive Device (Sit-Stand Transfers) walker, front-wheeled  -[facility] Name 04/01/22 1242          Gait/Stairs (Locomotion)    [place] Level (Gait) contact guard;verbal cues  -     Assistive Device (Gait) walker, front-wheeled  -     Distance in Feet (Gait) 425 ft  -     Deviations/Abnormal Patterns (Gait) gait speed decreased;rafael decreased;stride length decreased  -     Bilateral Gait Deviations forward flexed posture;heel strike decreased  -           User Key  (r) = Recorded By, (t) = Taken By, (c) = Cosigned By    Initials Name Provider Type    Andreia Obregon, PT Physical Therapist               Obj/Interventions     [facility] Name 04/01/22 1243          Range of Motion Comprehensive    General Range of Motion no range of motion deficits identified  -[facility] Name 04/01/22 1243          Strength Comprehensive (MMT)    General Manual Muscle Testing (MMT) Assessment no strength deficits identified  -[facility] Name 04/01/22  1243          Balance    Static Sitting Balance supervision  -     Dynamic Sitting Balance supervision  -     Static Standing Balance contact guard  -     Dynamic Standing Balance contact guard  -     Position/Device Used, Standing Balance walker, front-wheeled  -           User Key  (r) = Recorded By, (t) = Taken By, (c) = Cosigned By    Initials Name Provider Type    Andreia Obregon, PT Physical Therapist               Goals/Plan    No documentation.                Clinical Impression     Row Name 04/01/22 1244          Pain    Pretreatment Pain Rating 2/10  -KH     Posttreatment Pain Rating 2/10  -     Pain Location - neck  -     Row Name 04/01/22 1244          Plan of Care Review    Plan of Care Reviewed With patient  -     Outcome Evaluation Pt is now s/p diverticulectomy and has a drain to L neck. Pt is ambulating well with rwx with SBA- CGA. Encouraged pt to ambulate with nursing staff 2-3 times per day. PT will see once of the weekend to practice stairs.  -     Row Name 04/01/22 1244          Positioning and Restraints    Pre-Treatment Position in bed  -     Post Treatment Position bed  -     In Bed fowlers;call light within reach;encouraged to call for assist;exit alarm on;with family/caregiver  -           User Key  (r) = Recorded By, (t) = Taken By, (c) = Cosigned By    Initials Name Provider Type    Andreia Obregon, PT Physical Therapist               Outcome Measures     Row Name 04/01/22 1252          How much help from another person do you currently need...    Turning from your back to your side while in flat bed without using bedrails? 4  -KH     Moving from lying on back to sitting on the side of a flat bed without bedrails? 3  -KH     Moving to and from a bed to a chair (including a wheelchair)? 3  -KH     Standing up from a chair using your arms (e.g., wheelchair, bedside chair)? 3  -KH     Climbing 3-5 steps with a railing? 2  -KH     To walk in  hospital room? 3  -     AM-PAC 6 Clicks Score (PT) 18  -     Row Name 04/01/22 1252          Functional Assessment    Outcome Measure Options AM-PAC 6 Clicks Basic Mobility (PT)  -           User Key  (r) = Recorded By, (t) = Taken By, (c) = Cosigned By    Initials Name Provider Type    Andreia Obregon, PT Physical Therapist                             Physical Therapy Education                 Title: PT OT SLP Therapies (Done)     Topic: Physical Therapy (Done)     Point: Mobility training (Done)     Learning Progress Summary           Patient Acceptance, E, VU,NR by  at 4/1/2022 1252    Acceptance, E, VU by DB at 3/29/2022 1625                   Point: Home exercise program (Done)     Learning Progress Summary           Patient Acceptance, E, VU,NR by  at 4/1/2022 1252    Acceptance, E, VU by DB at 3/29/2022 1625                   Point: Body mechanics (Done)     Learning Progress Summary           Patient Acceptance, E, VU,NR by  at 4/1/2022 1252    Acceptance, E, VU by DB at 3/29/2022 1625                   Point: Precautions (Done)     Learning Progress Summary           Patient Acceptance, E, VU,NR by  at 4/1/2022 1252    Acceptance, E, VU by DB at 3/29/2022 1625                               User Key     Initials Effective Dates Name Provider Type Discipline     06/16/21 -  Andreia Coy, PT Physical Therapist PT    TESSY 06/16/21 -  Olga Lidia Martin PT Physical Therapist PT              PT Recommendation and Plan     Plan of Care Reviewed With: patient  Outcome Evaluation: Pt is now s/p diverticulectomy and has a drain to L neck. Pt is ambulating well with rwx with SBA- CGA. Encouraged pt to ambulate with nursing staff 2-3 times per day. PT will see once of the weekend to practice stairs.     Time Calculation:    PT Charges     Row Name 04/01/22 1252             Time Calculation    Start Time 1021  -      Stop Time 1052  -      Time Calculation (min) 31 min  -               Time Calculation- PT    Total Timed Code Minutes- PT 31 minute(s)  -KH              Timed Charges    43651 - PT Therapeutic Activity Minutes 31  -KH              Total Minutes    Timed Charges Total Minutes 31  -KH       Total Minutes 31  -KH            User Key  (r) = Recorded By, (t) = Taken By, (c) = Cosigned By    Initials Name Provider Type    Andreia Obregon, PT Physical Therapist              Therapy Charges for Today     Code Description Service Date Service Provider Modifiers Qty    [id number]  PT THERAPEUTIC ACT EA 15 MIN 4/1/2022 Andreia Coy, PT GP 2          PT G-Codes  Outcome Measure Options: AM-PAC 6 Clicks Basic Mobility (PT)  AM-PAC 6 Clicks Score (PT): 18    Andreia Coy, PT  4/1/2022

## 2022-04-01 NOTE — TELEPHONE ENCOUNTER
Caller: Kathleen Carpenter    Relationship: Emergency Contact    Best call back number: [phone]    What is the best time to reach you: ANYTIME    Who are you requesting to speak with (clinical staff, provider,  specific staff member): STAFF      What was the call regarding: PATIENTS DAUGHTER CALLED IN AND WAS WONDERING WHEN DR. JAMES WAS GOING TO ROUND ON HER MOTHER IN THE HOSPITAL FOLLOWING A SURGERY WITH DR. JAMES 3.31.22 PATIENTS DAUGHTER WAS GOING TO GO HOME AND TRY TO RUN ERRANDS BUT DIDN'T WANT TO MISS DR. JAMES WHEN HE CAME TO ROUND ON HER MOTHER.    Do you require a callback: YES

## 2022-04-01 NOTE — PLAN OF CARE
Goal Outcome Evaluation:  Plan of Care Reviewed With: patient           Outcome Evaluation: Pt is now s/p diverticulectomy and has a drain to L neck. Pt is ambulating well with rwx with SBA- CGA. Encouraged pt to ambulate with nursing staff 2-3 times per day. PT will see once of the weekend to practice stairs.

## 2022-04-01 NOTE — PROGRESS NOTES
"  POST-OPERATIVE NOTE     Chief Complaint: Zenker's diverticulum  S/P: ZENKERS DIVERTICULECTOMY WITH MYOTOMY, ESOPHAGOSCOPY  POD # 1    Subjective:  Symptoms:  Stable.  She reports weakness.  No shortness of breath, chest pain or chest pressure.    Diet:  NPO.  No nausea or vomiting.    Activity level: Impaired due to weakness.    Pain:  She complains of pain that is mild.  She reports pain is improving.  Pain is partially controlled.    Resting in bed.  Pain well controlled.    Objective:  General Appearance:  Comfortable and in no acute distress.    Vital signs: (most recent): Blood pressure 165/78, pulse 78, temperature 97.9 °F (36.6 °C), temperature source Oral, resp. rate 16, height 152.4 cm (60\"), weight 62.8 kg (138 lb 7.2 oz), SpO2 98 %.  Vital signs are normal.    HEENT: Normal HEENT exam.    Lungs:  Normal effort.  She is not in respiratory distress.  There are decreased breath sounds.    Heart: Normal rate.    Extremities: Decreased range of motion.  There is no dependent edema.    Pulses: Distal pulses are intact.    Neurological: Patient is alert.    Skin:  Warm and dry.  (Postoperative incision to left neck is well approximated with Dermabond intact.  ANH drain in place with serosanguineous output)            ANH drain: 50ml      Results Review:     I reviewed the patient's new clinical results.  I reviewed the patient's new imaging results and agree with the interpretation.  I reviewed the patient's other test results and agree with the interpretation  Discussed with Patient, RN and Dr. Louis    Assessment/Plan     Ms. Mcknight is POD #1 s/p Zenker's diverticulectomy.  She is doing well today and esophagram from this morning is negative for leak.  We will initiate her on ice chips today and ask speech therapy to evaluate for aspiration.  Plan to advance to clear liquid diet tomorrow.  Appreciate assistance from nursing staff and physical therapy with increasing mobilization and assistance with strength " training for generalized weakness.  We will continue to follow.      Shellie Ugarte DNP, APRN  [facility]  04/01/22  11:52 EDT    Patient was seen and assessed while wearing personal protective equipment including facemask, protective eyewear and gloves.  Hand hygiene performed prior to entering the room and upon exiting with doffing of gloves.

## 2022-04-01 NOTE — PLAN OF CARE
Problem: Skin Injury Risk Increased  Goal: Skin Health and Integrity  Outcome: Ongoing, Progressing  Intervention: Optimize Skin Protection  Recent Flowsheet Documentation  Taken 4/1/2022 1800 by Shania Reynolds RN  Head of Bed ([facility] [place] Positioning: HOB elevated  Taken 4/1/2022 1200 by Shania Reynolds RN  Head of Bed ([facility] [place]) Positioning: HOB elevated  Taken 4/1/2022 1000 by Shania Reynolds RN  Head of Bed ([facility] [place] Positioning: HOB elevated  Taken 4/1/2022 0800 by Shania Reynolds RN  Head of Bed ([facility] [place]) Positioning: HOB elevated      Goal Outcome Evaluation:  Plan of Care Reviewed With: patient        Progress: improving  Outcome Evaluation: VSS. NPO with ice chips, tolerating well. TPN infusing. Up with assist. Pulmonary hygiene encouraged. No c/o pain. Will monitor.

## 2022-04-02 ENCOUNTER — APPOINTMENT (OUTPATIENT)
Dept: GENERAL RADIOLOGY | Facility: HOSPITAL | Age: 87
End: 2022-04-02

## 2022-04-02 LAB
ALBUMIN SERPL-MCNC: 3.1 G/DL (ref 3.5–5.2)
ALBUMIN/GLOB SERPL: 1 G/DL
ALP SERPL-CCNC: 91 U/L (ref 39–117)
ALT SERPL W P-5'-P-CCNC: 15 U/L (ref 1–33)
ANION GAP SERPL CALCULATED.3IONS-SCNC: 8 MMOL/L (ref 5–15)
AST SERPL-CCNC: 20 U/L (ref 1–32)
BASOPHILS # BLD AUTO: 0.03 10*3/MM3 (ref 0–0.2)
BASOPHILS NFR BLD AUTO: 0.3 % (ref 0–1.5)
BILIRUB SERPL-MCNC: 0.2 MG/DL (ref 0–1.2)
BUN SERPL-MCNC: 15 MG/DL (ref 8–23)
BUN/CREAT SERPL: 22.1 (ref 7–25)
CALCIUM SPEC-SCNC: 8.3 MG/DL (ref 8.2–9.6)
CHLORIDE SERPL-SCNC: 97 MMOL/L (ref 98–107)
CO2 SERPL-SCNC: 30 MMOL/L (ref 22–29)
CREAT SERPL-MCNC: 0.68 MG/DL (ref 0.57–1)
DEPRECATED RDW RBC AUTO: 39.4 FL (ref 37–54)
EGFRCR SERPLBLD CKD-EPI 2021: 80.8 ML/MIN/1.73
EOSINOPHIL # BLD AUTO: 0.41 10*3/MM3 (ref 0–0.4)
EOSINOPHIL NFR BLD AUTO: 3.6 % (ref 0.3–6.2)
ERYTHROCYTE [DISTWIDTH] IN BLOOD BY AUTOMATED COUNT: 12.3 % (ref 12.3–15.4)
GLOBULIN UR ELPH-MCNC: 3.1 GM/DL
GLUCOSE BLDC GLUCOMTR-MCNC: 130 MG/DL (ref 70–130)
GLUCOSE BLDC GLUCOMTR-MCNC: 158 MG/DL (ref 70–130)
GLUCOSE BLDC GLUCOMTR-MCNC: 164 MG/DL (ref 70–130)
GLUCOSE BLDC GLUCOMTR-MCNC: 182 MG/DL (ref 70–130)
GLUCOSE SERPL-MCNC: 145 MG/DL (ref 65–99)
HCT VFR BLD AUTO: 29.3 % (ref 34–46.6)
HGB BLD-MCNC: 9.9 G/DL (ref 12–15.9)
IMM GRANULOCYTES # BLD AUTO: 0.06 10*3/MM3 (ref 0–0.05)
IMM GRANULOCYTES NFR BLD AUTO: 0.5 % (ref 0–0.5)
LYMPHOCYTES # BLD AUTO: 2.19 10*3/MM3 (ref 0.7–3.1)
LYMPHOCYTES NFR BLD AUTO: 19.4 % (ref 19.6–45.3)
MAGNESIUM SERPL-MCNC: 1.7 MG/DL (ref 1.7–2.3)
MCH RBC QN AUTO: 29.3 PG (ref 26.6–33)
MCHC RBC AUTO-ENTMCNC: 33.8 G/DL (ref 31.5–35.7)
MCV RBC AUTO: 86.7 FL (ref 79–97)
MONOCYTES # BLD AUTO: 0.96 10*3/MM3 (ref 0.1–0.9)
MONOCYTES NFR BLD AUTO: 8.5 % (ref 5–12)
NEUTROPHILS NFR BLD AUTO: 67.7 % (ref 42.7–76)
NEUTROPHILS NFR BLD AUTO: 7.61 10*3/MM3 (ref 1.7–7)
NRBC BLD AUTO-RTO: 0 /100 WBC (ref 0–0.2)
PHOSPHATE SERPL-MCNC: 3 MG/DL (ref 2.5–4.5)
PLATELET # BLD AUTO: 262 10*3/MM3 (ref 140–450)
PMV BLD AUTO: 9.8 FL (ref 6–12)
POTASSIUM SERPL-SCNC: 3.7 MMOL/L (ref 3.5–5.2)
PROT SERPL-MCNC: 6.2 G/DL (ref 6–8.5)
RBC # BLD AUTO: 3.38 10*6/MM3 (ref 3.77–5.28)
SODIUM SERPL-SCNC: 135 MMOL/L (ref 136–145)
WBC NRBC COR # BLD: 11.26 10*3/MM3 (ref 3.4–10.8)

## 2022-04-02 PROCEDURE — 92610 EVALUATE SWALLOWING FUNCTION: CPT | Performed by: SPEECH-LANGUAGE PATHOLOGIST

## 2022-04-02 PROCEDURE — 85025 COMPLETE CBC W/AUTO DIFF WBC: CPT | Performed by: THORACIC SURGERY (CARDIOTHORACIC VASCULAR SURGERY)

## 2022-04-02 PROCEDURE — 25010000002 MAGNESIUM SULFATE PER 500 MG OF MAGNESIUM: Performed by: NURSE PRACTITIONER

## 2022-04-02 PROCEDURE — 84100 ASSAY OF PHOSPHORUS: CPT | Performed by: THORACIC SURGERY (CARDIOTHORACIC VASCULAR SURGERY)

## 2022-04-02 PROCEDURE — 0 MAGNESIUM SULFATE 4 GM/100ML SOLUTION: Performed by: THORACIC SURGERY (CARDIOTHORACIC VASCULAR SURGERY)

## 2022-04-02 PROCEDURE — 25010000002 POTASSIUM CHLORIDE PER 2 MEQ OF POTASSIUM: Performed by: NURSE PRACTITIONER

## 2022-04-02 PROCEDURE — 71045 X-RAY EXAM CHEST 1 VIEW: CPT

## 2022-04-02 PROCEDURE — 99232 SBSQ HOSP IP/OBS MODERATE 35: CPT | Performed by: NURSE PRACTITIONER

## 2022-04-02 PROCEDURE — 25010000002 ENOXAPARIN PER 10 MG: Performed by: THORACIC SURGERY (CARDIOTHORACIC VASCULAR SURGERY)

## 2022-04-02 PROCEDURE — 63710000001 INSULIN LISPRO (HUMAN) PER 5 UNITS: Performed by: INTERNAL MEDICINE

## 2022-04-02 PROCEDURE — 83735 ASSAY OF MAGNESIUM: CPT | Performed by: THORACIC SURGERY (CARDIOTHORACIC VASCULAR SURGERY)

## 2022-04-02 PROCEDURE — 80053 COMPREHEN METABOLIC PANEL: CPT | Performed by: THORACIC SURGERY (CARDIOTHORACIC VASCULAR SURGERY)

## 2022-04-02 PROCEDURE — 82962 GLUCOSE BLOOD TEST: CPT

## 2022-04-02 PROCEDURE — 25010000002 CALCIUM GLUCONATE PER 10 ML: Performed by: NURSE PRACTITIONER

## 2022-04-02 PROCEDURE — 99024 POSTOP FOLLOW-UP VISIT: CPT | Performed by: NURSE PRACTITIONER

## 2022-04-02 RX ADMIN — Medication 10 ML: at 20:35

## 2022-04-02 RX ADMIN — SODIUM ACETATE: 164 INJECTION, SOLUTION, CONCENTRATE INTRAVENOUS at 18:07

## 2022-04-02 RX ADMIN — ENOXAPARIN SODIUM 40 MG: 100 INJECTION SUBCUTANEOUS at 09:45

## 2022-04-02 RX ADMIN — Medication 10 ML: at 09:47

## 2022-04-02 RX ADMIN — PANTOPRAZOLE SODIUM 40 MG: 40 INJECTION, POWDER, FOR SOLUTION INTRAVENOUS at 07:33

## 2022-04-02 RX ADMIN — Medication 10 ML: at 09:46

## 2022-04-02 RX ADMIN — INSULIN LISPRO 2 UNITS: 100 INJECTION, SOLUTION INTRAVENOUS; SUBCUTANEOUS at 11:58

## 2022-04-02 RX ADMIN — INSULIN LISPRO 2 UNITS: 100 INJECTION, SOLUTION INTRAVENOUS; SUBCUTANEOUS at 18:07

## 2022-04-02 RX ADMIN — I.V. FAT EMULSION 20 G: 20 EMULSION INTRAVENOUS at 18:07

## 2022-04-02 RX ADMIN — MAGNESIUM SULFATE HEPTAHYDRATE 4 G: 4 INJECTION, SOLUTION INTRAVENOUS at 09:30

## 2022-04-02 RX ADMIN — INSULIN LISPRO 2 UNITS: 100 INJECTION, SOLUTION INTRAVENOUS; SUBCUTANEOUS at 09:30

## 2022-04-02 NOTE — PROGRESS NOTES
"   LOS: 7 days   Patient Care Team:  Fanta Shankar MD as PCP - General  Fanta Shankar MD as PCP - Family Medicine      Chief Complaint:  Following for aortic stenosis      Interval History:   Family at bedside.  Patient resting comfortably.  She denies any chest pain or palpitation symptoms.  She reports her chronic dyspnea is at baseline.  She reports blood pressure outside of the hospital usually runs 130s to 140 systolic.  Blood pressure remains elevated during admission.  We discussed starting low-dose blood pressure medication however patient declines at this time.  She reports that CT surgery told her that high blood pressure was felt to be reactive.  We will continue to monitor.        Objective   Vital Signs  Temp:  [97.9 °F (36.6 °C)-98.5 °F (36.9 °C)] 98.5 °F (36.9 °C)  Heart Rate:  [74-79] 74  Resp:  [16-17] 16  BP: (165-178)/(78-85) 166/82    Intake/Output Summary (Last 24 hours) at 4/2/2022 0822  Last data filed at 4/2/2022 0648  Gross per 24 hour   Intake --   Output 2110 ml   Net -2110 ml       Last Weight and Admission Weight        04/02/22  0519   Weight: 63.3 kg (139 lb 9.6 oz)     Flowsheet Rows    Flowsheet Row First Filed Value   Admission Height 152.4 cm (60\") Documented at 03/25/2022 1843   Admission Weight 59 kg (130 lb) Documented at 03/25/2022 1843              Physical Exam  Constitutional:       General: She is not in acute distress.  HENT:      Head: Normocephalic and atraumatic.   Cardiovascular:      Rate and Rhythm: Normal rate and regular rhythm.      Heart sounds: Murmur heard.     No friction rub. No gallop.   Pulmonary:      Effort: Pulmonary effort is normal. No respiratory distress.      Breath sounds: Normal breath sounds.   Abdominal:      General: Bowel sounds are normal. There is no distension.      Palpations: Abdomen is soft.   Musculoskeletal:         General: No swelling or tenderness.      Cervical back: Neck supple.   Skin:     General: Skin is warm and dry. "      Capillary Refill: Capillary refill takes less than 2 seconds.      Findings: No erythema.   Neurological:      Mental Status: She is alert and oriented to person, place, and time.             Results Review:      Results from last 7 days   Lab Units 04/02/22  0542 04/01/22  0533 03/31/22  0526   SODIUM mmol/L 135* 132* 132*   POTASSIUM mmol/L 3.7 4.4 4.1   CHLORIDE mmol/L 97* 96* 98   CO2 mmol/L 30.0* 26.3 27.0   BUN mg/dL 15 16 22   CREATININE mg/dL 0.68 0.82 0.71   GLUCOSE mg/dL 145* 323* 167*   CALCIUM mg/dL 8.3 8.3 8.2         Results from last 7 days   Lab Units 04/02/22  0542 04/01/22  0533 03/31/22  0526   WBC 10*3/mm3 11.26* 9.33 9.17   HEMOGLOBIN g/dL 9.9* 9.7* 9.3*   HEMATOCRIT % 29.3* 28.9* 27.7*   PLATELETS 10*3/mm3 262 247 236     Results from last 7 days   Lab Units 03/31/22  0526   INR  1.32*   APTT seconds 41.8*         Results from last 7 days   Lab Units 04/02/22  0542   MAGNESIUM mg/dL 1.7     Results from last 7 days   Lab Units 03/29/22  0755   TRIGLYCERIDES mg/dL 102               Medication Review:   aspirin, 81 mg, Oral, Daily  cetirizine, 5 mg, Oral, Daily  enoxaparin, 40 mg, Subcutaneous, Daily  Fat Emulsion Plant Based, 100 mL, Intravenous, Q24H (TPN)  insulin lispro, 0-7 Units, Subcutaneous, 4x Daily With Meals & Nightly  pantoprazole, 40 mg, Intravenous, Q AM  sodium chloride, 10 mL, Intravenous, Q12H  sodium chloride, 10 mL, Intravenous, Q12H  sodium chloride, 10 mL, Intravenous, Q12H  sodium chloride, 10 mL, Intravenous, Q12H      Adult Central 2-in-1 TPN, , Last Rate: 75 mL/hr at 04/01/22 1716  lactated ringers, 9 mL/hr, Last Rate: 9 mL/hr (03/31/22 1450)  Pharmacy to Dose TPN,               Assessment/Plan   1. Zenker's diverticulectomy: Thoracic surgery following  2. Aortic stenosis: Mild to moderate on 3/2022 echo.  3. Elevated blood pressure: Patient declines any blood pressure medications at this time.  Elevated readings felt to likely be reactive.  We will continue to  monitor.  4. Carotid artery disease          Thanks,    Angi Read, ELEUTERIO, APRN  04/02/22  08:22 EDT

## 2022-04-02 NOTE — PROGRESS NOTES
Name: Shaina Mcknight ADMIT: 3/25/2022   : 10/4/1927  PCP: Fanta Shankar MD    MRN: [id number] LOS: 7 days   AGE/SEX: 89 y.o. female  ROOM: [facility]     Subjective   Subjective   CC: difficulty swallowing  No acute events. Patient has no new complaints. Pain is controlled. No CP/dyspnea/f/c/n/v. Had a couple of loose stools. Two of her daughters are at bedside.    Objective   Objective   Vital Signs  Temp:  [98 °F (36.7 °C)-98.5 °F (36.9 °C)] 98.5 °F (36.9 °C)  Heart Rate:  [74-79] 74  Resp:  [16-17] 16  BP: (166-178)/(79-85) 166/82  SpO2:  [96 %-98 %] 98 %  on  Flow (L/min):  [2] 2;   Device (Oxygen Therapy): room air  Body mass index is 27.26 kg/m².  Physical Exam  Vitals and nursing note reviewed.   Constitutional:       General: She is not in acute distress.     Appearance: She is not toxic-appearing.      Comments: Elderly, frail   HENT:      Head: Normocephalic and atraumatic.      Nose: Nose normal.      Mouth/Throat:      Mouth: Mucous membranes are moist.      Pharynx: Oropharynx is clear.   Eyes:      Extraocular Movements: Extraocular movements intact.      Conjunctiva/sclera: Conjunctivae normal.      Pupils: Pupils are equal, round, and reactive to light.   Neck:      Trachea: Trachea normal.      Comments: Surgical wound c/d/i  ANH drain in place with serosanguinous drainage  Cardiovascular:      Rate and Rhythm: Normal rate and regular rhythm.      Pulses: Normal pulses.   Pulmonary:      Effort: Pulmonary effort is normal.      Breath sounds: Normal breath sounds.   Abdominal:      General: Bowel sounds are normal.      Palpations: Abdomen is soft.   Musculoskeletal:         General: Swelling (1+ BLE) present. No tenderness.   Skin:     General: Skin is warm and dry.      Capillary Refill: Capillary refill takes less than 2 seconds.   Neurological:      General: No focal deficit present.      Mental Status: She is alert and oriented to person, place, and time.   Psychiatric:         Mood  and Affect: Mood normal.         Behavior: Behavior normal.     Results Review     I reviewed the patient's new clinical results.  I reviewed the patient's telemetry.  Results from last 7 days   Lab Units 04/02/22  0542 04/01/22 0533 03/31/22  0526 03/29/22  0755   WBC 10*3/mm3 11.26* 9.33 9.17 12.89*   HEMOGLOBIN g/dL 9.9* 9.7* 9.3* 10.2*   PLATELETS 10*3/mm3 262 247 236 267     Results from last 7 days   Lab Units 04/02/22  0542 04/01/22  0533 03/31/22  0526 03/30/22  0911   SODIUM mmol/L 135* 132* 132* 135*   POTASSIUM mmol/L 3.7 4.4 4.1 3.9   CHLORIDE mmol/L 97* 96* 98 99   CO2 mmol/L 30.0* 26.3 27.0 20.0*   BUN mg/dL 15 16 22 22   CREATININE mg/dL 0.68 0.82 0.71 0.94   GLUCOSE mg/dL 145* 323* 167* 69   EGFR mL/min/1.73 80.8 66.4 78.9 56.3*     Results from last 7 days   Lab Units 04/02/22  0542 04/01/22  0533 03/31/22  0526 03/30/22  0911   ALBUMIN g/dL 3.10* 2.60* 2.90* 3.00*   BILIRUBIN mg/dL 0.2 0.2 0.2 0.3   ALK PHOS U/L 91 82 77 88   AST (SGOT) U/L 20 15 17 18   ALT (SGPT) U/L 15 10 10 9     Results from last 7 days   Lab Units 04/02/22  0542 04/01/22  0533 03/31/22  0526 03/30/22  0911   CALCIUM mg/dL 8.3 8.3 8.2 8.3   ALBUMIN g/dL 3.10* 2.60* 2.90* 3.00*   MAGNESIUM mg/dL 1.7 2.0 2.0 2.6*   PHOSPHORUS mg/dL 3.0 3.0 2.2* 3.1       Glucose   Date/Time Value Ref Range Status   04/02/2022 1111 182 (H) 70 - 130 mg/dL Final     Comment:     Meter: [id number] : [id number] Asif Waite NA   04/02/2022 0639 164 (H) 70 - 130 mg/dL Final     Comment:     Meter: [id number] : [id number] Jared ROGERA   04/01/2022 2239 170 (H) 70 - 130 mg/dL Final     Comment:     Meter: [id number] : [id number] Huseyin Lopez RN   04/01/2022 1644 157 (H) 70 - 130 mg/dL Final     Comment:     Meter: [id number] : [id number] Javier Mitchell NA   04/01/2022 1127 177 (H) 70 - 130 mg/dL Final     Comment:     Meter: [id number] : [id number] Javier MCQUEEN   04/01/2022 0603 320 (H) 70 - 130 mg/dL Final     Comment:      Meter: [id number] : [id number] Irvin MCQUEEN   04/01/2022 0112 314 (H) 70 - 130 mg/dL Final     Comment:     Meter: [id number] : [id number] Phill Romo RN       XR Chest 1 View  ONE VIEW PORTABLE CHEST AT 5:49 AM     HISTORY: Recent surgery for Zenker's diverticulum.     FINDINGS: The lungs are well-expanded with some biapical parenchymal and  pleural scarring. A surgical drain is present in the lower left neck  without change from 2 days ago. A right-sided PICC line ends in the SVC.  The heart remains mildly enlarged.     This report was finalized on 4/2/2022 6:59 AM by Dr. Brennan García M.D.       Scheduled Medications  aspirin, 81 mg, Oral, Daily  cetirizine, 5 mg, Oral, Daily  enoxaparin, 40 mg, Subcutaneous, Daily  Fat Emulsion Plant Based, 100 mL, Intravenous, Q24H (TPN)  insulin lispro, 0-7 Units, Subcutaneous, 4x Daily With Meals & Nightly  pantoprazole, 40 mg, Intravenous, Q AM  sodium chloride, 10 mL, Intravenous, Q12H  sodium chloride, 10 mL, Intravenous, Q12H  sodium chloride, 10 mL, Intravenous, Q12H  sodium chloride, 10 mL, Intravenous, Q12H    Infusions  Adult Central 2-in-1 TPN, , Last Rate: 75 mL/hr at 04/01/22 1716  lactated ringers, 9 mL/hr, Last Rate: 9 mL/hr (03/31/22 1450)  Pharmacy to Dose TPN,     Diet  Adult Central 2-in-1 TPN  Diet Clear Liquid       Assessment/Plan     Active Hospital Problems    Diagnosis  POA   • **Esophageal dysphagia [R13.19]  Yes   • Hyperglycemia [R73.9]  No   • Elevated blood pressure reading without diagnosis of hypertension [R03.0]  No   • Leukocytosis [D72.829]  Yes   • 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   Zenker's Diverticulum with Esophageal Obstruction  - continue NPO until cleared by  thoracic surgery  - PICC line placed, repositioned 3/30-on TPN  - appreciate thoracic surgery recs  - appreciate cardiology recs    Hyperglycemia  - likely reactive from surgery and from TPN  - BG is in acceptable range  - continue ssi/hypoglycemia protocol coverage    High Blood Pressure  - no history of HTN  - this is possibly reactive but I would not expect this to persist this long-may need some antihypertensive therapy especially given her aortic stenosis    Anemia  - stable, no signs of active bleeding  - check iron studies, B12, and folate in the AM    SCDs for DVT prophylaxis.  Full code.  Discussed with patient, family and nursing staff.  Anticipate discharge home with HH vs SNU facility timing yet to be determined.      Jaspal Ortiz MD  [facility]  04/02/22  11:34 EDT

## 2022-04-02 NOTE — PROGRESS NOTES
"  POST-OPERATIVE NOTE     Chief Complaint: Zenker's diverticulum  S/P: ZENKERS DIVERTICULECTOMY WITH MYOTOMY, ESOPHAGOSCOPY  POD # 2    Subjective:  Symptoms:  Stable.  She reports weakness.  No shortness of breath, chest pain or chest pressure.    Diet:  NPO.  No nausea or vomiting.    Activity level: Impaired due to weakness.    Pain:  She complains of pain that is mild.  She reports pain is improving.  Pain is partially controlled.    Ambulated this morning. Tolerating sips and chips. No new complaints.     Objective:  General Appearance:  Comfortable and in no acute distress.    Vital signs: (most recent): Blood pressure 166/82, pulse 74, temperature 98.5 °F (36.9 °C), temperature source Oral, resp. rate 16, height 152.4 cm (60\"), weight 63.3 kg (139 lb 9.6 oz), SpO2 98 %.  Vital signs are normal.    HEENT: Normal HEENT exam.    Lungs:  Normal effort.  She is not in respiratory distress.  There are decreased breath sounds.    Heart: Normal rate.    Extremities: Decreased range of motion.  There is no dependent edema.    Pulses: Distal pulses are intact.    Neurological: Patient is alert.    Skin:  Warm and dry.  (Postoperative incision to left neck is well approximated with Dermabond intact.  ANH drain in place with serosanguineous output)            ANH drain: 10ml      Results Review:     I reviewed the patient's new clinical results.  I reviewed the patient's new imaging results and agree with the interpretation.  I reviewed the patient's other test results and agree with the interpretation  Discussed with Patient, RN and Dr. Louis    Assessment/Plan     Ms. Mcknight is POD #2 s/p Zenker's diverticulectomy. Continues to do very well.  Tolerating sips and chips without difficulty.  She was evaluated by speech therapy who recommends nectar thickened liquids.  We will advance her to a clear diet today and plan to initiate soft diet tomorrow.  Video swallow study scheduled for Monday morning.  Discussed with SLP via " secure chat.  Appreciate their assistance.    Hopefully we can discontinue TPN and plan to discharge home on Monday pending stable clinical course.  Continue to increase mobilization.      Shellie Ugarte DNP, APRN  [facility]  04/02/22  10:50 EDT    Patient was seen and assessed while wearing personal protective equipment including facemask, protective eyewear and gloves.  Hand hygiene performed prior to entering the room and upon exiting with doffing of gloves.

## 2022-04-02 NOTE — THERAPY EVALUATION
Acute Care - Speech Language Pathology   Swallow Initial Evaluation [facility]     Patient Name: Shaina Mcknight  : 10/4/1927  MRN: [id number]  Today's Date: 2022               Admit Date: 3/25/2022    Visit Dx:     ICD-10-CM ICD-9-CM   1. Esophageal dysphagia  R13.19 787.29   2. Nonrheumatic aortic valve stenosis  I35.0 424.1   3. Zenker's diverticulum  K22.5 530.6     Patient Active Problem List   Diagnosis   • TB (tuberculosis)   • Dyspnea on exertion   • Nonrheumatic aortic valve stenosis   • Community acquired pneumonia of right lower lobe of lung   • Leukocytosis   • Acute respiratory failure with hypoxia (HCC)   • Bacterial pneumonia   • Sepsis (HCC)   • Esophageal dysphagia   • Aortic valve regurgitation   • Rojas esophagus   • Anemia   • Zenker's diverticulum   • Leukocytosis   • Ketosis (HCC)   • Hyperglycemia   • Elevated blood pressure reading without diagnosis of hypertension     Past Medical History:   Diagnosis Date   • Acute pain of left shoulder due to trauma    • Anemia    • Aortic valve regurgitation     trace   • Aortic valve stenosis     mild   • Rojas esophagus    • Cancer (HCC) 1990    some skin cancers   • SPENCER (dyspnea on exertion)    • Esophageal varices (HCC) 2018   • Fall     going up the steps   • GERD (gastroesophageal reflux disease) 2018   • Heart murmur    • Mitral valve regurgitation     mild to moderate   • Pulmonary valve regurgitation     trace   • Tricuspid valve regurgitation     mild to moderate     Past Surgical History:   Procedure Laterality Date   • CATARACT EXTRACTION Bilateral    • HYSTERECTOMY     • TONSILLECTOMY AND ADENOIDECTOMY     • ZENKERS DIVERTICULECTOMY N/A 3/31/2022    Procedure: ZENKERS DIVERTICULECTOMY WITH MYOTOMY, ESOPHAGOSCOPY;  Surgeon: Chilango Louis III, MD;  Location: [facility];  Service: Thoracic;  Laterality: N/A;     Patient was not wearing a face mask during this therapy encounter. Therapist used appropriate personal  protective equipment including mask, eye protection and gloves.  Mask used was standard procedure mask. Appropriate PPE was worn during the entire therapy session. Hand hygiene was completed before and after therapy session. Patient is not in enhanced droplet precautions.             SLP Recommendation and Plan  SLP Swallowing Diagnosis: esophageal dysphagia, R/O pharyngeal dysphagia (04/02/22 1000)  SLP Diet Recommendation: clear liquid diet, nectar thick liquids (04/02/22 1000)  Recommended Precautions and Strategies: upright posture during/after eating, small bites of food and sips of liquid (04/02/22 1000)  SLP Rec. for Method of Medication Administration: meds whole, meds via alternate route, as tolerated (04/02/22 1000)     Monitor for Signs of Aspiration: yes, notify SLP if any concerns (04/02/22 1000)     Swallow Criteria for Skilled Therapeutic Interventions Met: demonstrates skilled criteria (04/02/22 1000)  Anticipated Discharge Disposition (SLP): home with assist (04/02/22 1000)  Rehab Potential/Prognosis, Swallowing: good, to achieve stated therapy goals (04/02/22 1000)  Therapy Frequency (Swallow): PRN (04/02/22 1000)  Predicted Duration Therapy Intervention (Days): until discharge (04/02/22 1000)                                  Plan of Care Reviewed With: patient  Outcome Evaluation: Clinical swallow evaluation completed recommend NTL clear liquids and VFSS Monday morning to r/o aspiration risk, Small bites and sips. Meds whole as tolerated or via alternate route.      SWALLOW EVALUATION (last 72 hours)     SLP Adult Swallow Evaluation     Row Name 04/02/22 1000                   Rehab Evaluation    Document Type evaluation  -KA        Subjective Information no complaints  -KA        Patient Observations alert;cooperative  -KA        Patient Effort good  -KA        Symptoms Noted During/After Treatment none  -KA                  General Information    Patient Profile Reviewed yes  -KA         Pertinent History Of Current Problem Patient has history of large Zenkers diverticulum obstructing esophagus and history of reflux. s/p diverticulectomy with myotomy.  -KA        Current Method of Nutrition NPO  -KA        Precautions/Limitations, Vision WFL with corrective lenses  -KA        Precautions/Limitations, Hearing WFL;for purposes of eval  -KA        Prior Level of Function-Communication WFL  -KA        Prior Level of Function-Swallowing esophageal concerns  -KA        Plans/Goals Discussed with patient and family;agreed upon  -KA        Barriers to Rehab none identified  -KA        Patient's Goals for Discharge return to PO diet  -KA                  Pain Scale: Numbers Pre/Post-Treatment    Pretreatment Pain Rating 2/10  -KA        Posttreatment Pain Rating 2/10  -KA        Pain Location - neck  -KA                  Oral Motor Structure and Function    Dentition Assessment natural, present and adequate  -KA        Secretion Management WNL/WFL  -KA        Mucosal Quality moist, healthy  -KA                  Oral Musculature and Cranial Nerve Assessment    Oral Motor General Assessment WFL  -KA                  General Eating/Swallowing Observations    Respiratory Support Currently in Use room air  -KA        Eating/Swallowing Skills self-fed  -KA        Positioning During Eating upright in chair  -KA        Utensils Used spoon;cup  -KA        Consistencies Trialed thin liquids;nectar/syrup-thick liquids  -KA        Pre SpO2 (%) 98  -KA        Post SpO2 (%) 98  -KA                  Clinical Swallow Eval    Clinical Swallow Evaluation Summary SLP verified orders and plan with SONDRA Ugarte who stated only to assess clear liquids today. Patient demonstrated intermittent throat clearing with cup sips of thin liquids (water) approximately 50% of the trials. Only x2 throat clears with jello and x2 throat clears with NTL. Patient stated she prefered the NTL and it get down easier. Discussed plan with  SONDRA Ugarte recommend NTL clear liquids and okay for VFSS Monday morning to assess solids.  -KA                  SLP Evaluation Clinical Impression    SLP Swallowing Diagnosis esophageal dysphagia;R/O pharyngeal dysphagia  -KA        Functional Impact risk of aspiration/pneumonia  -KA        Rehab Potential/Prognosis, Swallowing good, to achieve stated therapy goals  -        Swallow Criteria for Skilled Therapeutic Interventions Met demonstrates skilled criteria  -KA                  SLP Treatment Clinical Impressions    Care Plan Review evaluation/treatment results reviewed  -        Care Plan Review, Other Participant(s) daughter  -KA                  Recommendations    Therapy Frequency (Swallow) PRN  -KA        Predicted Duration Therapy Intervention (Days) until discharge  -KA        SLP Diet Recommendation clear liquid diet;nectar thick liquids  -KA        Recommended Precautions and Strategies upright posture during/after eating;small bites of food and sips of liquid  -KA        Oral Care Recommendations Oral Care BID/PRN  -KA        SLP Rec. for Method of Medication Administration meds whole;meds via alternate route;as tolerated  -KA        Monitor for Signs of Aspiration yes;notify SLP if any concerns  -KA        Anticipated Discharge Disposition (SLP) home with assist  -KA                  Swallow Goals (SLP)    Oral Nutrition/Hydration Goal Selection (SLP) oral nutrition/hydration, SLP goal 1  -KA                  Oral Nutrition/Hydration Goal 1 (SLP)    Oral Nutrition/Hydration Goal 1, SLP Patient will tolerate least restrictive diet without overt s/s of pen/asp  -KA        Time Frame (Oral Nutrition/Hydration Goal 1, SLP) short term goal (STG);by discharge  -              User Key  (r) = Recorded By, (t) = Taken By, (c) = Cosigned By    Initials Name Effective Dates    Saul Nolen MA,[facility]-SLP 06/16/21 -                 EDUCATION  The patient has been educated in the following  areas:   Dysphagia (Swallowing Impairment).        SLP GOALS     Row Name 04/02/22 1000             Oral Nutrition/Hydration Goal 1 (SLP)    Oral Nutrition/Hydration Goal 1, SLP Patient will tolerate least restrictive diet without overt s/s of pen/asp  -KA      Time Frame (Oral Nutrition/Hydration Goal 1, SLP) short term goal (STG);by discharge  -            User Key  (r) = Recorded By, (t) = Taken By, (c) = Cosigned By    Initials Name Provider Type    Saul Nolen MA,CCC-SLP Speech and Language Pathologist              SLP Outcome Measures (last 72 hours)     SLP Outcome Measures     Row Name 04/02/22 1000             SLP Outcome Measures    Outcome Measure Used? Adult NOMS  -KA              Adult FCM Scores    FCM Chosen Swallowing  -KA      Swallowing FCM Score 4  -KA            User Key  (r) = Recorded By, (t) = Taken By, (c) = Cosigned By    Initials Name Effective Dates    Saul Nolen MA,CCC-SLP 06/16/21 -                  Time Calculation:    Time Calculation- SLP     Row Name 04/02/22 1057             Time Calculation- SLP    SLP Start Time 1000  -KA      SLP Received On 04/02/22  -KA              Untimed Charges    SLP Eval/Re-eval  ST Eval Oral Pharyng Swallow - 70426  -KA      61603-ST Eval Oral Pharyng Swallow Minutes 60  -KA              Total Minutes    Untimed Charges Total Minutes 60  -KA       Total Minutes 60  -KA            User Key  (r) = Recorded By, (t) = Taken By, (c) = Cosigned By    Initials Name Provider Type    Saul Nolen MA,CCC-SLP Speech and Language Pathologist                Therapy Charges for Today     Code Description Service Date Service Provider Modifiers Qty    [id number]  ST EVAL ORAL PHARYNG SWALLOW 4 4/2/2022 Saul Post MA,CCC-SLP GN 1               Saul Post MA,HERNAN-SLP  4/2/2022

## 2022-04-02 NOTE — PLAN OF CARE
Goal Outcome Evaluation:  VSS. No c/o pain. Tolerating TPA and lipids. Tolerating light ice chips. Plan to advance to clear liquids today. Will continue to monitor.

## 2022-04-02 NOTE — PROGRESS NOTES
"[facility] Clinical Pharmacy Services: Total Parental Nutrition Initial Consult    Indication: inaccessible GI tract (Zenkers diverticulum- plan for Zenkers diverticulectomy with myotomy)  Route: central(PICC line placed on 3/29 however not in correct position so had to be repositioned on 3/30)  Type: standard    Day 4 (ordered on 3/29 however not started due to PICC line not in correct position)    Relevant clinical data and objective history reviewed:  89 y.o. female 152.4 cm (60\") 63.3 kg (139 lb 9.6 oz)    Results from last 7 days   Lab Units 04/02/22  0542 03/30/22  0911 03/29/22  0755   SODIUM mmol/L 135*   < > 137   POTASSIUM mmol/L 3.7   < > 4.0   CHLORIDE mmol/L 97*   < > 97*   CO2 mmol/L 30.0*   < > 20.8*   BUN mg/dL 15   < > 19   CREATININE mg/dL 0.68   < > 0.88   CALCIUM mg/dL 8.3   < > 8.5   ALBUMIN g/dL 3.10*   < >  --    BILIRUBIN mg/dL 0.2   < >  --    ALK PHOS U/L 91   < >  --    ALT (SGPT) U/L 15   < >  --    AST (SGOT) U/L 20   < >  --    GLUCOSE mg/dL 145*   < > 91   MAGNESIUM mg/dL 1.7   < > 1.5*   PHOSPHORUS mg/dL 3.0   < > 4.0   TRIGLYCERIDES mg/dL  --   --  102    < > = values in this interval not displayed.        Estimated Creatinine Clearance: 42 mL/min (by C-G formula based on SCr of 0.68 mg/dL).    Active fluid orders: Lactated ringer at 75ml/hr (now dced)    Dietary Orders (From admission, onward)       Start     Ordered    04/02/22 1050  Diet Clear Liquid  Diet Effective Now        Question:  Diet Texture / Consistency  Answer:  Clear Liquid    04/02/22 1050                  Assessment  Ideal Body Weight: 45.5 kg  Adjusted Body Weight: 54.9 kg  Basal Energy Expenditure: 1032 kCal/Day  Daily Est. Luis: 1098- 1927 kCal/Day  Estimated Fluid Requirements: 1600 to 2200 mL/day    Goal per Dietitian recommendations   Protein: 65 grams/kg/day (1.2 grams/day)   Dextrose: 1400 Kcal/day (411 grams/day)   Lipids: 100 ml of 20% lipid infusion 7 days/week              Total kcals: 1860 kcals      "         Goal rate 75ml/hr (1800mL/day)    4/2: Plans to initiate clear liquid diet today per thoracic surgery note yesterday    Plan  TPN with the following macros (at goal):   Protein/Dextrose/Lipids: 65g/1400kcals/ 100mL daily lipids starting today    Volume: 1800 ml (75 ml/hr over 24 hrs daily)      Based on the above labs, will add the following electrolytes/additives to the TPN.    Sodium Chloride: 0 mEq (on national shortage so will utilize sodium acetate and phosphate for now)   Sodium Acetate: 60 mEq   Sodium Phosphate: 0 mEq   Potassium Chloride: 50 mEq   Potassium Acetate: 0 mEq   Potassium Phosphate: 22 mEq   Calcium Gluconate: 9 mEq   Magnesium Sulfate: 10 mEq   MVI added MWF   Trace Elements    Labs to be ordered: cmp, mg,phos in am     Pharmacy will continue to follow.     Chloé Woodward, PharmD  Clinical Pharmacist

## 2022-04-02 NOTE — PLAN OF CARE
Goal Outcome Evaluation:  Plan of Care Reviewed With: patient           Outcome Evaluation: Clinical swallow evaluation completed recommend NTL clear liquids and VFSS Monday morning to r/o aspiration risk, Small bites and sips. Meds whole as tolerated or via alternate route.

## 2022-04-02 NOTE — PLAN OF CARE
Problem: Adult Inpatient Plan of Care  Goal: Absence of Hospital-Acquired Illness or Injury  Intervention: Identify and Manage Fall Risk  Recent Flowsheet Documentation  Taken 4/2/2022 1600 by Leia Crawford RN  Safety Promotion/Fall Prevention:   activity supervised   assistive device/personal items within reach   clutter free environment maintained   safety round/check completed  Taken 4/2/2022 1234 by Leia Crawford, RN  Safety Promotion/Fall Prevention:   activity supervised   assistive device/personal items within reach   clutter free environment maintained   fall prevention program maintained   safety round/check completed  Taken 4/2/2022 1000 by Leia Crawford RN  Safety Promotion/Fall Prevention:   activity supervised   assistive device/personal items within reach   clutter free environment maintained   fall prevention program maintained   lighting adjusted   safety round/check completed  Taken 4/2/2022 0900 by Leia Crawford RN  Safety Promotion/Fall Prevention:   activity supervised   fall prevention program maintained   clutter free environment maintained   assistive device/personal items within reach  Taken 4/2/2022 0813 by Leia Crawford RN  Safety Promotion/Fall Prevention:   activity supervised   assistive device/personal items within reach   clutter free environment maintained   safety round/check completed   fall prevention program maintained   lighting adjusted  Intervention: Prevent and Manage VTE (Venous Thromboembolism) Risk  Recent Flowsheet Documentation  Taken 4/2/2022 1600 by Leia Crawford RN  Activity Management: ambulated outside room  Taken 4/2/2022 1234 by Leia Crawford RN  Activity Management: activity encouraged  Taken 4/2/2022 1000 by Leia Crawford RN  Activity Management: up in chair  Taken 4/2/2022 0900 by Leia Crawford RN  Activity Management: activity adjusted per tolerance  Range of Motion: active ROM (range of motion) encouraged  Taken 4/2/2022 0813 by  Leia Crawford, RN  Activity Management: activity adjusted per tolerance   Goal Outcome Evaluation:            Patient advanced to clear liquid/ nectar thick-tolerating well.  Encouraging ambulation out in hallway, purewick removed to encourage voiding. Pain controlled. Video swallow scheduled for Monday.

## 2022-04-03 LAB
ANION GAP SERPL CALCULATED.3IONS-SCNC: 6 MMOL/L (ref 5–15)
BASOPHILS # BLD AUTO: 0.07 10*3/MM3 (ref 0–0.2)
BASOPHILS NFR BLD AUTO: 0.6 % (ref 0–1.5)
BUN SERPL-MCNC: 19 MG/DL (ref 8–23)
BUN/CREAT SERPL: 21.3 (ref 7–25)
CALCIUM SPEC-SCNC: 8.6 MG/DL (ref 8.2–9.6)
CHLORIDE SERPL-SCNC: 96 MMOL/L (ref 98–107)
CO2 SERPL-SCNC: 29 MMOL/L (ref 22–29)
CREAT SERPL-MCNC: 0.89 MG/DL (ref 0.57–1)
DEPRECATED RDW RBC AUTO: 40.2 FL (ref 37–54)
EGFRCR SERPLBLD CKD-EPI 2021: 60.2 ML/MIN/1.73
EOSINOPHIL # BLD AUTO: 0.65 10*3/MM3 (ref 0–0.4)
EOSINOPHIL NFR BLD AUTO: 5.9 % (ref 0.3–6.2)
ERYTHROCYTE [DISTWIDTH] IN BLOOD BY AUTOMATED COUNT: 12.4 % (ref 12.3–15.4)
FERRITIN SERPL-MCNC: 495 NG/ML (ref 13–150)
FOLATE SERPL-MCNC: 16.6 NG/ML (ref 4.78–24.2)
GLUCOSE BLDC GLUCOMTR-MCNC: 119 MG/DL (ref 70–130)
GLUCOSE BLDC GLUCOMTR-MCNC: 124 MG/DL (ref 70–130)
GLUCOSE BLDC GLUCOMTR-MCNC: 136 MG/DL (ref 70–130)
GLUCOSE BLDC GLUCOMTR-MCNC: 142 MG/DL (ref 70–130)
GLUCOSE SERPL-MCNC: 129 MG/DL (ref 65–99)
HCT VFR BLD AUTO: 31.3 % (ref 34–46.6)
HGB BLD-MCNC: 10.2 G/DL (ref 12–15.9)
IMM GRANULOCYTES # BLD AUTO: 0.12 10*3/MM3 (ref 0–0.05)
IMM GRANULOCYTES NFR BLD AUTO: 1.1 % (ref 0–0.5)
IRON 24H UR-MRATE: 37 MCG/DL (ref 37–145)
IRON SATN MFR SERPL: 14 % (ref 20–50)
LYMPHOCYTES # BLD AUTO: 2.19 10*3/MM3 (ref 0.7–3.1)
LYMPHOCYTES NFR BLD AUTO: 19.9 % (ref 19.6–45.3)
MCH RBC QN AUTO: 29.1 PG (ref 26.6–33)
MCHC RBC AUTO-ENTMCNC: 32.6 G/DL (ref 31.5–35.7)
MCV RBC AUTO: 89.2 FL (ref 79–97)
MONOCYTES # BLD AUTO: 1.29 10*3/MM3 (ref 0.1–0.9)
MONOCYTES NFR BLD AUTO: 11.7 % (ref 5–12)
NEUTROPHILS NFR BLD AUTO: 6.68 10*3/MM3 (ref 1.7–7)
NEUTROPHILS NFR BLD AUTO: 60.8 % (ref 42.7–76)
NRBC BLD AUTO-RTO: 0 /100 WBC (ref 0–0.2)
PLATELET # BLD AUTO: 280 10*3/MM3 (ref 140–450)
PMV BLD AUTO: 9.7 FL (ref 6–12)
POTASSIUM SERPL-SCNC: 4.2 MMOL/L (ref 3.5–5.2)
RBC # BLD AUTO: 3.51 10*6/MM3 (ref 3.77–5.28)
SODIUM SERPL-SCNC: 131 MMOL/L (ref 136–145)
TIBC SERPL-MCNC: 258 MCG/DL (ref 298–536)
TRANSFERRIN SERPL-MCNC: 173 MG/DL (ref 200–360)
VIT B12 BLD-MCNC: 1442 PG/ML (ref 211–946)
WBC NRBC COR # BLD: 11 10*3/MM3 (ref 3.4–10.8)

## 2022-04-03 PROCEDURE — 99232 SBSQ HOSP IP/OBS MODERATE 35: CPT | Performed by: NURSE PRACTITIONER

## 2022-04-03 PROCEDURE — 25010000002 MAGNESIUM SULFATE PER 500 MG OF MAGNESIUM: Performed by: NURSE PRACTITIONER

## 2022-04-03 PROCEDURE — 82728 ASSAY OF FERRITIN: CPT | Performed by: INTERNAL MEDICINE

## 2022-04-03 PROCEDURE — 99024 POSTOP FOLLOW-UP VISIT: CPT | Performed by: NURSE PRACTITIONER

## 2022-04-03 PROCEDURE — 83540 ASSAY OF IRON: CPT | Performed by: INTERNAL MEDICINE

## 2022-04-03 PROCEDURE — 80048 BASIC METABOLIC PNL TOTAL CA: CPT | Performed by: NURSE PRACTITIONER

## 2022-04-03 PROCEDURE — 25010000002 CALCIUM GLUCONATE PER 10 ML: Performed by: NURSE PRACTITIONER

## 2022-04-03 PROCEDURE — 25010000002 POTASSIUM CHLORIDE PER 2 MEQ OF POTASSIUM: Performed by: NURSE PRACTITIONER

## 2022-04-03 PROCEDURE — 84466 ASSAY OF TRANSFERRIN: CPT | Performed by: INTERNAL MEDICINE

## 2022-04-03 PROCEDURE — 82746 ASSAY OF FOLIC ACID SERUM: CPT | Performed by: INTERNAL MEDICINE

## 2022-04-03 PROCEDURE — 85025 COMPLETE CBC W/AUTO DIFF WBC: CPT | Performed by: THORACIC SURGERY (CARDIOTHORACIC VASCULAR SURGERY)

## 2022-04-03 PROCEDURE — 82962 GLUCOSE BLOOD TEST: CPT

## 2022-04-03 PROCEDURE — 82607 VITAMIN B-12: CPT | Performed by: INTERNAL MEDICINE

## 2022-04-03 PROCEDURE — 97530 THERAPEUTIC ACTIVITIES: CPT

## 2022-04-03 PROCEDURE — 25010000002 ENOXAPARIN PER 10 MG: Performed by: THORACIC SURGERY (CARDIOTHORACIC VASCULAR SURGERY)

## 2022-04-03 RX ADMIN — Medication 10 ML: at 22:06

## 2022-04-03 RX ADMIN — SODIUM ACETATE: 164 INJECTION, SOLUTION, CONCENTRATE INTRAVENOUS at 17:30

## 2022-04-03 RX ADMIN — Medication 10 ML: at 22:05

## 2022-04-03 RX ADMIN — Medication 10 ML: at 08:35

## 2022-04-03 RX ADMIN — Medication 10 ML: at 22:01

## 2022-04-03 RX ADMIN — ENOXAPARIN SODIUM 40 MG: 100 INJECTION SUBCUTANEOUS at 08:35

## 2022-04-03 RX ADMIN — Medication 10 ML: at 22:00

## 2022-04-03 RX ADMIN — PANTOPRAZOLE SODIUM 40 MG: 40 INJECTION, POWDER, FOR SOLUTION INTRAVENOUS at 06:29

## 2022-04-03 NOTE — PLAN OF CARE
Goal Outcome Evaluation:  VSS. No c/o pain. ANH drain and dressing c/d/I- plan to dc drain in am. Tolerating a clear liquid diet. Continue TPN and lipids infusing. Encouraged to turn every 2 hours. Yonker at bedside for patients productive cough. Will continue to monitor.

## 2022-04-03 NOTE — PLAN OF CARE
Goal Outcome Evaluation:  Plan of Care Reviewed With: patient, daughter        Progress: improving  Outcome Evaluation: Pt maintaining inc ambulation distance and added STS from chair and marching in place which pt tolerated well. Fatigue as ambulation progressed and pt required cuing to inc R step length as she was near step to with RLE. Inc time for turns as pt is not used to FWW. Pt will have to step up and over tub rail at home. Pt to go home possibly tomorrow with  PT.

## 2022-04-03 NOTE — PROGRESS NOTES
"   LOS: 8 days   Patient Care Team:  Fanta Shankar MD as PCP - General  Fanta Shankar MD as PCP - Family Medicine      Chief Complaint:  Following for aortic stenosis      Interval History:   Patient resting comfortably in chair.  Daughter is at bedside.  Patient denies any chest pain or discomfort symptoms.  Appears euvolemic on exam.  No breathing issues.  Had another elevated blood pressure reading this morning but reports it was after she had been up walking around.  Blood pressure on recheck much improved at 120/57.        Objective   Vital Signs  Temp:  [97.8 °F (36.6 °C)-98.2 °F (36.8 °C)] 97.8 °F (36.6 °C)  Heart Rate:  [87-92] 92  Resp:  [16-17] 16  BP: (120-177)/(57-84) 120/57    Intake/Output Summary (Last 24 hours) at 4/3/2022 1158  Last data filed at 4/3/2022 0600  Gross per 24 hour   Intake 240 ml   Output 1010 ml   Net -770 ml       Last Weight and Admission Weight        04/02/22  0519   Weight: 63.3 kg (139 lb 9.6 oz)     Flowsheet Rows    Flowsheet Row First Filed Value   Admission Height 152.4 cm (60\") Documented at 03/25/2022 1843   Admission Weight 59 kg (130 lb) Documented at 03/25/2022 1843              Physical Exam  Constitutional:       General: She is not in acute distress.  HENT:      Head: Normocephalic and atraumatic.   Cardiovascular:      Rate and Rhythm: Normal rate and regular rhythm.      Heart sounds: Murmur heard.     No friction rub. No gallop.   Pulmonary:      Effort: Pulmonary effort is normal. No respiratory distress.      Breath sounds: Normal breath sounds.   Abdominal:      General: Bowel sounds are normal. There is no distension.      Palpations: Abdomen is soft.   Musculoskeletal:         General: No swelling or tenderness.      Cervical back: Neck supple.   Skin:     General: Skin is warm and dry.      Capillary Refill: Capillary refill takes less than 2 seconds.      Findings: No erythema.   Neurological:      Mental Status: She is alert and oriented to " person, place, and time.             Results Review:      Results from last 7 days   Lab Units 04/03/22  0751 04/02/22  0542 04/01/22  0533   SODIUM mmol/L 131* 135* 132*   POTASSIUM mmol/L 4.2 3.7 4.4   CHLORIDE mmol/L 96* 97* 96*   CO2 mmol/L 29.0 30.0* 26.3   BUN mg/dL 19 15 16   CREATININE mg/dL 0.89 0.68 0.82   GLUCOSE mg/dL 129* 145* 323*   CALCIUM mg/dL 8.6 8.3 8.3         Results from last 7 days   Lab Units 04/03/22  0751 04/02/22  0542 04/01/22  0533   WBC 10*3/mm3 11.00* 11.26* 9.33   HEMOGLOBIN g/dL 10.2* 9.9* 9.7*   HEMATOCRIT % 31.3* 29.3* 28.9*   PLATELETS 10*3/mm3 280 262 247     Results from last 7 days   Lab Units 03/31/22  0526   INR  1.32*   APTT seconds 41.8*         Results from last 7 days   Lab Units 04/02/22  0542   MAGNESIUM mg/dL 1.7     Results from last 7 days   Lab Units 03/29/22  0755   TRIGLYCERIDES mg/dL 102               Medication Review:   aspirin, 81 mg, Oral, Daily  cetirizine, 5 mg, Oral, Daily  enoxaparin, 40 mg, Subcutaneous, Daily  Fat Emulsion Plant Based, 100 mL, Intravenous, Q24H (TPN)  insulin lispro, 0-7 Units, Subcutaneous, 4x Daily With Meals & Nightly  pantoprazole, 40 mg, Intravenous, Q AM  sodium chloride, 10 mL, Intravenous, Q12H  sodium chloride, 10 mL, Intravenous, Q12H  sodium chloride, 10 mL, Intravenous, Q12H  sodium chloride, 10 mL, Intravenous, Q12H      Adult Central 2-in-1 TPN, , Last Rate: 75 mL/hr at 04/02/22 1807  lactated ringers, 9 mL/hr, Last Rate: 9 mL/hr (03/31/22 1450)  Pharmacy to Dose TPN,             Assessment/Plan   1. Zenker's diverticulectomy: Thoracic surgery following.  To get video swallow study Monday per CT surgery.  2. Aortic stenosis: Mild to moderate on 3/2022 echo.  3. Elevated blood pressure: Appears improved today.  She continues to decline any blood pressure medications as she feels strongly that this was reactive.  Blood pressure at home has always been well controlled and she has a blood pressure cuff at home and will  continue to monitor and call if she gets elevated readings at home, parameters given.  4. Carotid artery disease    -Patient believes she is to be discharged tomorrow  -Okay to discharge from cardiac standpoint when cleared with other teams.  -Blood pressure does appear overall improved yesterday and today.  Continue to monitor.  If it does become elevated again then would favor low-dose medication for better control, patient previously declined.  -Would have her follow-up in the office in 1 month with Dr. Linder's APRN, Lia Sutton, once discharged.    -She will monitor blood pressure and call sooner for any high readings or other cardiac issues or concerns prior to that time.          Thanks,    Angi Read DNP, APRN  04/03/22  11:58 EDT

## 2022-04-03 NOTE — PLAN OF CARE
Goal Outcome Evaluation:  Plan of Care Reviewed With: patient, family        Progress: improving  Outcome Evaluation: VSS on RA. AOx4. Pt very active throughout shift- up in chair and walking around nurses station with walker and standby assist. TPN/lipids to be reduced to 40 mL/hr from 75 and likely able to DC tomorrow per MD note. Diet advanced to regular, GI soft. ANH drain removed. Plan for pt to likely DC home with family tomorrow. Family has remained at bedside throughout the day and POC reviewed with family. GENEVA.

## 2022-04-03 NOTE — PROGRESS NOTES
Name: Shaina Mcknihgt ADMIT: 3/25/2022   : 10/4/1927  PCP: Fanta Shankar MD    MRN: [id number] LOS: 8 days   AGE/SEX: 89 y.o. female  ROOM: [facility] ([place])     Subjective   Subjective   CC: difficulty swallowing  No acute events. Patient has no new complaints. Drain pulled this AM. Pain is controlled. No CP/dyspnea/f/c/n/v. No more loose stools. Taking PO.  Two of her daughters are at bedside.    Objective   Objective   Vital Signs  Temp:  [97.8 °F (36.6 °C)-98.2 °F (36.8 °C)] 97.8 °F (36.6 °C)  Heart Rate:  [87-92] 92  Resp:  [16-17] 16  BP: (120-177)/(57-84) 120/57  SpO2:  [94 %-97 %] 94 %  on  Flow (L/min):  [2] 2;   Device (Oxygen Therapy): nasal cannula  Body mass index is 27.26 kg/m².  Physical Exam  Vitals and nursing note reviewed.   Constitutional:       General: She is not in acute distress.     Appearance: She is not toxic-appearing.      Comments: Elderly, frail   HENT:      Head: Normocephalic and atraumatic.      Nose: Nose normal.      Mouth/Throat:      Mouth: Mucous membranes are moist.      Pharynx: Oropharynx is clear.   Eyes:      Extraocular Movements: Extraocular movements intact.      Conjunctiva/sclera: Conjunctivae normal.      Pupils: Pupils are equal, round, and reactive to light.   Neck:      Trachea: Trachea normal.      Comments: Surgical wound c/d/i  Cardiovascular:      Rate and Rhythm: Normal rate and regular rhythm.      Pulses: Normal pulses.   Pulmonary:      Effort: Pulmonary effort is normal.      Breath sounds: Normal breath sounds.   Abdominal:      General: Bowel sounds are normal.      Palpations: Abdomen is soft.   Musculoskeletal:         General: Swelling (1+ BLE) present. No tenderness.   Skin:     General: Skin is warm and dry.      Capillary Refill: Capillary refill takes less than 2 seconds.   Neurological:      General: No focal deficit present.      Mental Status: She is alert and oriented to person, place, and time.   Psychiatric:         Mood and Affect: Mood  normal.         Behavior: Behavior normal.     Results Review     I reviewed the patient's new clinical results.  I reviewed the patient's telemetry.  Results from last 7 days   Lab Units 04/03/22  0751 04/02/22  0542 04/01/22  0533 03/31/22  0526   WBC 10*3/mm3 11.00* 11.26* 9.33 9.17   HEMOGLOBIN g/dL 10.2* 9.9* 9.7* 9.3*   PLATELETS 10*3/mm3 280 262 247 236     Results from last 7 days   Lab Units 04/03/22  0751 04/02/22  0542 04/01/22  0533 03/31/22  0526   SODIUM mmol/L 131* 135* 132* 132*   POTASSIUM mmol/L 4.2 3.7 4.4 4.1   CHLORIDE mmol/L 96* 97* 96* 98   CO2 mmol/L 29.0 30.0* 26.3 27.0   BUN mg/dL 19 15 16 22   CREATININE mg/dL 0.89 0.68 0.82 0.71   GLUCOSE mg/dL 129* 145* 323* 167*   EGFR mL/min/1.73 60.2 80.8 66.4 78.9     Results from last 7 days   Lab Units 04/02/22  0542 04/01/22  0533 03/31/22  0526 03/30/22  0911   ALBUMIN g/dL 3.10* 2.60* 2.90* 3.00*   BILIRUBIN mg/dL 0.2 0.2 0.2 0.3   ALK PHOS U/L 91 82 77 88   AST (SGOT) U/L 20 15 17 18   ALT (SGPT) U/L 15 10 10 9     Results from last 7 days   Lab Units 04/03/22  0751 04/02/22  0542 04/01/22  0533 03/31/22  0526 03/30/22  0911   CALCIUM mg/dL 8.6 8.3 8.3 8.2 8.3   ALBUMIN g/dL  --  3.10* 2.60* 2.90* 3.00*   MAGNESIUM mg/dL  --  1.7 2.0 2.0 2.6*   PHOSPHORUS mg/dL  --  3.0 3.0 2.2* 3.1       Glucose   Date/Time Value Ref Range Status   04/03/2022 1054 142 (H) 70 - 130 mg/dL Final     Comment:     Meter: [id number] : [id number] Asif Kydonte NA   04/03/2022 0834 136 (H) 70 - 130 mg/dL Final     Comment:     Meter: [id number] : [id number] Demianjose CovarrubiasCorie RN   04/02/2022 2006 130 70 - 130 mg/dL Final     Comment:     Meter: [id number] : [id number] Irvin Zena    04/02/2022 1759 158 (H) 70 - 130 mg/dL Final     Comment:     Meter: [id number] : [id number] Asif Kydonte    04/02/2022 1111 182 (H) 70 - 130 mg/dL Final     Comment:     Meter: [id number] : [id number] Asif Kydonte    04/02/2022 0639 164 (H) 70 - 130 mg/dL Final      Comment:     Meter: [id number] : [id number] Jared Overton CNA   04/01/2022 2239 170 (H) 70 - 130 mg/dL Final     Comment:     Meter: [id number] : [id number] Huseyin Lopez RN       XR Chest 1 View  ONE VIEW PORTABLE CHEST AT 5:49 AM     HISTORY: Recent surgery for Zenker's diverticulum.     FINDINGS: The lungs are well-expanded with some biapical parenchymal and  pleural scarring. A surgical drain is present in the lower left neck  without change from 2 days ago. A right-sided PICC line ends in the SVC.  The heart remains mildly enlarged.     This report was finalized on 4/2/2022 6:59 AM by Dr. Brennan García M.D.       Scheduled Medications  aspirin, 81 mg, Oral, Daily  cetirizine, 5 mg, Oral, Daily  enoxaparin, 40 mg, Subcutaneous, Daily  Fat Emulsion Plant Based, 100 mL, Intravenous, Q24H (TPN)  insulin lispro, 0-7 Units, Subcutaneous, 4x Daily With Meals & Nightly  pantoprazole, 40 mg, Intravenous, Q AM  sodium chloride, 10 mL, Intravenous, Q12H  sodium chloride, 10 mL, Intravenous, Q12H  sodium chloride, 10 mL, Intravenous, Q12H  sodium chloride, 10 mL, Intravenous, Q12H    Infusions  Adult Central 2-in-1 TPN, , Last Rate: 75 mL/hr at 04/02/22 1807  lactated ringers, 9 mL/hr, Last Rate: 9 mL/hr (03/31/22 1450)  Pharmacy to Dose TPN,     Diet  Adult Central 2-in-1 TPN  Diet Regular; GI Soft       Assessment/Plan     Active Hospital Problems    Diagnosis  POA   • **Esophageal dysphagia [R13.19]  Yes   • Hyperglycemia [R73.9]  No   • Elevated blood pressure reading without diagnosis of hypertension [R03.0]  No   • Leukocytosis [D72.829]  Yes   • 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   Zenker's Diverticulum with Esophageal Obstruction  - s/p Zenker's  Diverticulotomy with Myotomy and esophagoscopy 3/31/22  - PICC line placed, repositioned 3/30-on TPN  - advance diet per thoracic surgery  - appreciate thoracic surgery recs  - appreciate cardiology recs    Hyperglycemia  - likely reactive from surgery and from TPN  - BG is in acceptable range  - continue ssi/hypoglycemia protocol coverage    High Blood Pressure  - no history of HTN  - this is trending better-will hold off on starting pharmacologic therapy for this    Anemia  - stable, no signs of active bleeding  - B12 and folate okay-looks like she has a mild iron deficiency with concomitant anemia of inflammation/chronic disease  - recommend monitoring as an outpatient-should improve now that her above issues have been addressed    SCDs for DVT prophylaxis.  Full code.  Discussed with patient, family and nursing staff.  Anticipate discharge home with family tomorrow.      Jaspal Ortiz MD  [facility]  04/03/22  12:25 EDT

## 2022-04-03 NOTE — THERAPY TREATMENT NOTE
Patient Name: Shaina Mcknight  : 10/4/1927    MRN: [id number]                              Today's Date: 4/3/2022       Admit Date: 3/25/2022    Visit Dx:     ICD-10-CM ICD-9-CM   1. Esophageal dysphagia  R13.19 787.29   2. Nonrheumatic aortic valve stenosis  I35.0 424.1   3. Zenker's diverticulum  K22.5 530.6     Patient Active Problem List   Diagnosis   • TB (tuberculosis)   • Dyspnea on exertion   • Nonrheumatic aortic valve stenosis   • Community acquired pneumonia of right lower lobe of lung   • Leukocytosis   • Acute respiratory failure with hypoxia (HCC)   • Bacterial pneumonia   • Sepsis (HCC)   • Esophageal dysphagia   • Aortic valve regurgitation   • Rojas esophagus   • Anemia   • Zenker's diverticulum   • Leukocytosis   • Ketosis (HCC)   • Hyperglycemia   • Elevated blood pressure reading without diagnosis of hypertension     Past Medical History:   Diagnosis Date   • Acute pain of left shoulder due to trauma    • Anemia    • Aortic valve regurgitation     trace   • Aortic valve stenosis     mild   • Rojas esophagus    • Cancer (HCC) 1990    some skin cancers   • SPENCER (dyspnea on exertion)    • Esophageal varices (HCC) 2018   • Fall     going up the steps   • GERD (gastroesophageal reflux disease)    • Heart murmur    • Mitral valve regurgitation     mild to moderate   • Pulmonary valve regurgitation     trace   • Tricuspid valve regurgitation     mild to moderate     Past Surgical History:   Procedure Laterality Date   • CATARACT EXTRACTION Bilateral    • HYSTERECTOMY     • TONSILLECTOMY AND ADENOIDECTOMY     • ZENKERS DIVERTICULECTOMY N/A 3/31/2022    Procedure: ZENKERS DIVERTICULECTOMY WITH MYOTOMY, ESOPHAGOSCOPY;  Surgeon: Chilango Louis III, MD;  Location: [facility];  Service: Thoracic;  Laterality: N/A;      General Information     Row Name 22 1240          Physical Therapy Time and Intention    Document Type therapy note (daily note)  -LB     Mode of Treatment  physical therapy  -LB     Row Name 04/03/22 1240          General Information    Patient Profile Reviewed yes  -LB     Prior Level of Function independent:  -LB     Existing Precautions/Restrictions fall  -LB     Row Name 04/03/22 1240          Living Environment    People in Home child(mae), adult  -LB     Row Name 04/03/22 1240          Home Main Entrance    Number of Stairs, Main Entrance four  -LB     Row Name 04/03/22 1240          Stairs Within Home, Primary    Number of Stairs, Within Home, Primary none  -LB     Row Name 04/03/22 1240          Cognition    Orientation Status (Cognition) oriented x 4  -LB     Row Name 04/03/22 1240          Safety Issues, Functional Mobility    Impairments Affecting Function (Mobility) balance;strength;endurance/activity tolerance;pain  -LB           User Key  (r) = Recorded By, (t) = Taken By, (c) = Cosigned By    Initials Name Provider Type    LB Evelyn Swann PT Physical Therapist               Mobility     Row Name 04/03/22 1241          Bed Mobility    Supine-Sit [place] (Bed Mobility) not tested  -LB     Comment, (Bed Mobility) UIC  -LB     Row Name 04/03/22 1241          Sit-Stand Transfer    Sit-Stand [place] (Transfers) verbal cues;standby assist  -LB     Assistive Device (Sit-Stand Transfers) walker, front-wheeled  -LB     Row Name 04/03/22 1241          Gait/Stairs (Locomotion)    [place] Level (Gait) contact guard;verbal cues  -LB     Assistive Device (Gait) walker, front-wheeled  -LB     Distance in Feet (Gait) 425'  -LB     Deviations/Abnormal Patterns (Gait) gait speed decreased;rafael decreased;stride length decreased  -LB     Bilateral Gait Deviations forward flexed posture;heel strike decreased  -LB     Comment, (Gait/Stairs) unable to attempt stairs due to pt remaining on IV pole, performed STS x 5 and marching in place to simulate functional transfers  -LB           User Key  (r) = Recorded By, (t) = Taken By, (c) = Cosigned By    Initials  Name Provider Type    Evelyn Juarez, DEDE Physical Therapist               Obj/Interventions     Row Name 04/03/22 1242          Motor Skills    Therapeutic Exercise other (see comments)  performed STS x 5 from recliner, marching in place x 10 with pt able to reach 90 deg of B hip flexion  -LB           User Key  (r) = Recorded By, (t) = Taken By, (c) = Cosigned By    Initials Name Provider Type    Evelyn Juarez, DEDE Physical Therapist               Goals/Plan    No documentation.                Clinical Impression     Row Name 04/03/22 1243          Pain    Pretreatment Pain Rating 0/10 - no pain  -LB     Posttreatment Pain Rating 0/10 - no pain  -LB     Pain Intervention(s) Ambulation/increased activity  -LB     Row Name 04/03/22 1243          Plan of Care Review    Plan of Care Reviewed With patient;daughter  -LB     Progress improving  -LB     Outcome Evaluation Pt maintaining inc ambulation distance and added STS from chair and marching in place which pt tolerated well. Fatigue as ambulation progressed and pt required cuing to inc R step length as she was near step to with RLE. Inc time for turns as pt is not used to FWW. Pt will have to step up and over tub rail at home. Pt to go home possibly tomorrow with  PT.  -LB     Row Name 04/03/22 1243          Vital Signs    O2 Delivery Pre Treatment room air  -LB     O2 Delivery Intra Treatment room air  -LB     O2 Delivery Post Treatment room air  -LB     Pre Patient Position Sitting  -LB     Intra Patient Position Sitting  -LB     Row Name 04/03/22 1243          Positioning and Restraints    Pre-Treatment Position sitting in chair/recliner  -LB     Post Treatment Position chair  -LB     In Chair call light within reach;sitting;encouraged to call for assist;with family/caregiver  -LB           User Key  (r) = Recorded By, (t) = Taken By, (c) = Cosigned By    Initials Name Provider Type    Evelyn Juarez, PT Physical Therapist               Outcome Measures      Row Name 04/03/22 1245          How much help from another person do you currently need...    Turning from your back to your side while in flat bed without using bedrails? 4  -LB     Moving from lying on back to sitting on the side of a flat bed without bedrails? 3  -LB     Moving to and from a bed to a chair (including a wheelchair)? 3  -LB     Standing up from a chair using your arms (e.g., wheelchair, bedside chair)? 3  -LB     Climbing 3-5 steps with a railing? 3  -LB     To walk in hospital room? 3  -LB     AM-PAC 6 Clicks Score (PT) 19  -LB     Row Name 04/03/22 1245          Functional Assessment    Outcome Measure Options AM-PAC 6 Clicks Basic Mobility (PT)  -LB           User Key  (r) = Recorded By, (t) = Taken By, (c) = Cosigned By    Initials Name Provider Type    Evelyn Juarez PT Physical Therapist                             Physical Therapy Education                 Title: PT OT SLP Therapies (Done)     Topic: Physical Therapy (Done)     Point: Mobility training (Done)     Learning Progress Summary           Patient Acceptance, TB,D,E, VU,DU by LB at 4/3/2022 1246   Family Acceptance, E, VU,DU by XI at 4/2/2022 1023      Show all documentation for this point (3)                 Point: Home exercise program (Done)     Learning Progress Summary           Patient Acceptance, TB,D,E, VU,DU by LB at 4/3/2022 1246   Family Acceptance, E, VU,DU by XI at 4/2/2022 1023      Show all documentation for this point (3)                 Point: Body mechanics (Done)     Learning Progress Summary           Patient Acceptance, TB,D,E, VU,DU by LB at 4/3/2022 1246   Family Acceptance, E, VU,DU by XI at 4/2/2022 1023      Show all documentation for this point (3)                 Point: Precautions (Done)     Learning Progress Summary           Patient Acceptance, TB,D,E, VU,DU by LB at 4/3/2022 1246   Family Acceptance, E, VU,DU by XI at 4/2/2022 1023      Show all documentation for this point (3)                              User Key     Initials Effective Dates Name Provider Type Discipline    XI 01/17/22 -  Leia Crawford, RN Registered Nurse Nurse     08/09/20 -  Evelyn Swann PT Physical Therapist PT              PT Recommendation and Plan     Plan of Care Reviewed With: patient, daughter  Progress: improving  Outcome Evaluation: Pt maintaining inc ambulation distance and added STS from chair and marching in place which pt tolerated well. Fatigue as ambulation progressed and pt required cuing to inc R step length as she was near step to with RLE. Inc time for turns as pt is not used to FWW. Pt will have to step up and over tub rail at home. Pt to go home possibly tomorrow with HH PT.     Time Calculation:    PT Charges     Row Name 04/03/22 1300             Time Calculation    Start Time 1130  -LB      Stop Time 1155  -LB      Time Calculation (min) 25 min  -LB      PT Received On 04/03/22  -LB      PT - Next Appointment 04/04/22  -LB              Time Calculation- PT    Total Timed Code Minutes- PT 23 minute(s)  -LB            User Key  (r) = Recorded By, (t) = Taken By, (c) = Cosigned By    Initials Name Provider Type    LB Evelyn Swann PT Physical Therapist              Therapy Charges for Today     Code Description Service Date Service Provider Modifiers Qty    68043458715 HC PT THERAPEUTIC ACT EA 15 MIN 4/3/2022 Evelyn Swann PT GP 2          PT G-Codes  Outcome Measure Options: AM-PAC 6 Clicks Basic Mobility (PT)  AM-PAC 6 Clicks Score (PT): 19    Evelyn Swann PT  4/3/2022

## 2022-04-03 NOTE — PROGRESS NOTES
"[facility] Clinical Pharmacy Services: Total Parental Nutrition Initial Consult    Indication: inaccessible GI tract (Zenkers diverticulum- plan for Zenkers diverticulectomy with myotomy)  Route: central(PICC line placed on 3/29 however not in correct position so had to be repositioned on 3/30)  Type: standard    Day 5(ordered on 3/29 however not started due to PICC line not in correct position)    Relevant clinical data and objective history reviewed:  89 y.o. female 152.4 cm (60\") 63.3 kg (139 lb 9.6 oz)    Results from last 7 days   Lab Units 04/03/22  0751 04/02/22  0542 03/30/22  0911 03/29/22  0755   SODIUM mmol/L 131* 135*   < > 137   POTASSIUM mmol/L 4.2 3.7   < > 4.0   CHLORIDE mmol/L 96* 97*   < > 97*   CO2 mmol/L 29.0 30.0*   < > 20.8*   BUN mg/dL 19 15   < > 19   CREATININE mg/dL 0.89 0.68   < > 0.88   CALCIUM mg/dL 8.6 8.3   < > 8.5   ALBUMIN g/dL  --  3.10*   < >  --    BILIRUBIN mg/dL  --  0.2   < >  --    ALK PHOS U/L  --  91   < >  --    ALT (SGPT) U/L  --  15   < >  --    AST (SGOT) U/L  --  20   < >  --    GLUCOSE mg/dL 129* 145*   < > 91   MAGNESIUM mg/dL  --  1.7   < > 1.5*   PHOSPHORUS mg/dL  --  3.0   < > 4.0   TRIGLYCERIDES mg/dL  --   --   --  102    < > = values in this interval not displayed.        Estimated Creatinine Clearance: 32.1 mL/min (by C-G formula based on SCr of 0.89 mg/dL).    Active fluid orders: Lactated ringer at 75ml/hr (now dced)    Dietary Orders (From admission, onward)       Start     Ordered    04/03/22 1031  Diet Regular; GI Soft  Diet Effective Now        Question Answer Comment   Diet Texture / Consistency Regular    Common Modifiers GI Soft        04/03/22 1030                  Assessment  Ideal Body Weight: 45.5 kg  Adjusted Body Weight: 54.9 kg  Basal Energy Expenditure: 1032 kCal/Day  Daily Est. Luis: 1098- 1927 kCal/Day  Estimated Fluid Requirements: 1600 to 2200 mL/day    Goal per Dietitian recommendations   Protein: 65 grams/kg/day (1.2 " grams/day)   Dextrose: 1400 Kcal/day (411 grams/day)   Lipids: 100 ml of 20% lipid infusion 7 days/week              Total kcals: 1860 kcals              Goal rate 75ml/hr (1800mL/day)    4/2: Plans to initiate clear liquid diet today per thoracic surgery note yesterday  4/3: advanced to mechanical soft diet today with plans to stop TPN tomorrow    Plan  TPN with the following macros: reducing significantly today due to oral intake   Protein/Dextrose/Lipids: 40g/800kcals/ 0mL daily lipids    Volume: 960 ml (40 ml/hr over 24 hrs daily)      Based on the above labs, will add the following electrolytes/additives to the TPN.    Sodium Chloride: 0 mEq (on national shortage so will utilize sodium acetate and phosphate for now)   Sodium Acetate: 60 mEq   Sodium Phosphate: 0 mEq   Potassium Chloride: 50 mEq   Potassium Acetate: 0 mEq   Potassium Phosphate: 22 mEq   Calcium Gluconate: 9 mEq   Magnesium Sulfate: 10 mEq   MVI added MWF   Trace Elements    Labs to be ordered: BMP in AM    Pharmacy will continue to follow.     Chloé Woodward, PharmD  Clinical Pharmacist

## 2022-04-03 NOTE — PROGRESS NOTES
"  POST-OPERATIVE NOTE     Chief Complaint: Zenker's diverticulum  S/P: ZENKERS DIVERTICULECTOMY WITH MYOTOMY, ESOPHAGOSCOPY  POD # 3    Subjective:  Symptoms:  Stable.  She reports weakness.  No shortness of breath, chest pain or chest pressure.    Diet:  NPO.  No nausea or vomiting.    Activity level: Impaired due to weakness.    Pain:  She complains of pain that is mild.  She reports pain is improving.  Pain is partially controlled.    Up to chair, ambulated this morning.  Tolerating clear liquid diet.  No new complaints.    Objective:  General Appearance:  Comfortable and in no acute distress.    Vital signs: (most recent): Blood pressure 120/57, pulse 92, temperature 97.8 °F (36.6 °C), temperature source Oral, resp. rate 16, height 152.4 cm (60\"), weight 63.3 kg (139 lb 9.6 oz), SpO2 94 %.  Vital signs are normal.    HEENT: Normal HEENT exam.    Lungs:  Normal effort.  She is not in respiratory distress.  There are decreased breath sounds.    Heart: Normal rate.    Extremities: Decreased range of motion.  There is no dependent edema.    Pulses: Distal pulses are intact.    Neurological: Patient is alert.    Skin:  Warm and dry.  (Postoperative incision to left neck is well approximated with Dermabond intact.  ANH drain in place with serosanguineous output)            ANH drain: 10ml      Results Review:     I reviewed the patient's new clinical results.  I reviewed the patient's new imaging results and agree with the interpretation.  I reviewed the patient's other test results and agree with the interpretation  Discussed with Patient, RN and Dr. Louis    Assessment/Plan     Ms. Mcknight is POD #3 s/p Zenker's diverticulectomy. Continues to do very well.  Tolerating clear liquid diet without difficulty.  Continues to have some discomfort to her throat.  We will advance her to mechanical soft diet today.  ANH drain removed without difficulty.    Plan to discontinue TPN tomorrow and discharge home pending stable " clinical course.  Appreciate assistance from registered dietitian with soft diet education.  Continue to increase mobilization.  We will order home health per family request.      Shellie Ugarte DNP, APRN  [facility]  04/03/22  12:33 EDT    Patient was seen and assessed while wearing personal protective equipment including facemask, protective eyewear and gloves.  Hand hygiene performed prior to entering the room and upon exiting with doffing of gloves.

## 2022-04-04 ENCOUNTER — READMISSION MANAGEMENT (OUTPATIENT)
Dept: CALL CENTER | Facility: HOSPITAL | Age: 87
End: 2022-04-04

## 2022-04-04 ENCOUNTER — HOME HEALTH ADMISSION (OUTPATIENT)
Dept: HOME HEALTH SERVICES | Facility: HOME HEALTHCARE | Age: 87
End: 2022-04-04

## 2022-04-04 VITALS
OXYGEN SATURATION: 93 % | HEART RATE: 86 BPM | BODY MASS INDEX: 27.61 KG/M2 | WEIGHT: 140.65 LBS | TEMPERATURE: 98.9 F | HEIGHT: 60 IN | DIASTOLIC BLOOD PRESSURE: 60 MMHG | RESPIRATION RATE: 16 BRPM | SYSTOLIC BLOOD PRESSURE: 124 MMHG

## 2022-04-04 PROBLEM — D72.829 LEUKOCYTOSIS: Status: RESOLVED | Noted: 2022-03-28 | Resolved: 2022-04-04

## 2022-04-04 PROBLEM — R73.9 HYPERGLYCEMIA: Status: RESOLVED | Noted: 2022-04-01 | Resolved: 2022-04-04

## 2022-04-04 PROBLEM — E88.89 KETOSIS (HCC): Status: RESOLVED | Noted: 2022-03-28 | Resolved: 2022-04-04

## 2022-04-04 LAB
ANION GAP SERPL CALCULATED.3IONS-SCNC: 8 MMOL/L (ref 5–15)
BASOPHILS # BLD AUTO: 0.06 10*3/MM3 (ref 0–0.2)
BASOPHILS NFR BLD AUTO: 0.5 % (ref 0–1.5)
BH BB BLOOD EXPIRATION DATE: NORMAL
BH BB BLOOD EXPIRATION DATE: NORMAL
BH BB BLOOD TYPE BARCODE: 5100
BH BB BLOOD TYPE BARCODE: 7300
BH BB DISPENSE STATUS: NORMAL
BH BB DISPENSE STATUS: NORMAL
BH BB PRODUCT CODE: NORMAL
BH BB PRODUCT CODE: NORMAL
BH BB UNIT NUMBER: NORMAL
BH BB UNIT NUMBER: NORMAL
BUN SERPL-MCNC: 24 MG/DL (ref 8–23)
BUN/CREAT SERPL: 24.7 (ref 7–25)
CALCIUM SPEC-SCNC: 8.5 MG/DL (ref 8.2–9.6)
CHLORIDE SERPL-SCNC: 97 MMOL/L (ref 98–107)
CO2 SERPL-SCNC: 29 MMOL/L (ref 22–29)
CREAT SERPL-MCNC: 0.97 MG/DL (ref 0.57–1)
CROSSMATCH INTERPRETATION: NORMAL
CROSSMATCH INTERPRETATION: NORMAL
DEPRECATED RDW RBC AUTO: 40.7 FL (ref 37–54)
EGFRCR SERPLBLD CKD-EPI 2021: 54.3 ML/MIN/1.73
EOSINOPHIL # BLD AUTO: 0.6 10*3/MM3 (ref 0–0.4)
EOSINOPHIL NFR BLD AUTO: 4.9 % (ref 0.3–6.2)
ERYTHROCYTE [DISTWIDTH] IN BLOOD BY AUTOMATED COUNT: 12.7 % (ref 12.3–15.4)
GLUCOSE BLDC GLUCOMTR-MCNC: 111 MG/DL (ref 70–130)
GLUCOSE BLDC GLUCOMTR-MCNC: 121 MG/DL (ref 70–130)
GLUCOSE BLDC GLUCOMTR-MCNC: 141 MG/DL (ref 70–130)
GLUCOSE SERPL-MCNC: 122 MG/DL (ref 65–99)
HCT VFR BLD AUTO: 28.2 % (ref 34–46.6)
HGB BLD-MCNC: 9.4 G/DL (ref 12–15.9)
IMM GRANULOCYTES # BLD AUTO: 0.15 10*3/MM3 (ref 0–0.05)
IMM GRANULOCYTES NFR BLD AUTO: 1.2 % (ref 0–0.5)
LAB AP CASE REPORT: NORMAL
LAB AP DIAGNOSIS COMMENT: NORMAL
LYMPHOCYTES # BLD AUTO: 2.08 10*3/MM3 (ref 0.7–3.1)
LYMPHOCYTES NFR BLD AUTO: 17 % (ref 19.6–45.3)
MCH RBC QN AUTO: 29.5 PG (ref 26.6–33)
MCHC RBC AUTO-ENTMCNC: 33.3 G/DL (ref 31.5–35.7)
MCV RBC AUTO: 88.4 FL (ref 79–97)
MONOCYTES # BLD AUTO: 1.68 10*3/MM3 (ref 0.1–0.9)
MONOCYTES NFR BLD AUTO: 13.7 % (ref 5–12)
NEUTROPHILS NFR BLD AUTO: 62.7 % (ref 42.7–76)
NEUTROPHILS NFR BLD AUTO: 7.67 10*3/MM3 (ref 1.7–7)
NRBC BLD AUTO-RTO: 0 /100 WBC (ref 0–0.2)
PATH REPORT.FINAL DX SPEC: NORMAL
PATH REPORT.GROSS SPEC: NORMAL
PLATELET # BLD AUTO: 272 10*3/MM3 (ref 140–450)
PMV BLD AUTO: 9.8 FL (ref 6–12)
POTASSIUM SERPL-SCNC: 4.7 MMOL/L (ref 3.5–5.2)
RBC # BLD AUTO: 3.19 10*6/MM3 (ref 3.77–5.28)
SODIUM SERPL-SCNC: 134 MMOL/L (ref 136–145)
UNIT  ABO: NORMAL
UNIT  ABO: NORMAL
UNIT  RH: NORMAL
UNIT  RH: NORMAL
WBC NRBC COR # BLD: 12.24 10*3/MM3 (ref 3.4–10.8)

## 2022-04-04 PROCEDURE — 80048 BASIC METABOLIC PNL TOTAL CA: CPT | Performed by: NURSE PRACTITIONER

## 2022-04-04 PROCEDURE — 82962 GLUCOSE BLOOD TEST: CPT

## 2022-04-04 PROCEDURE — 25010000002 ENOXAPARIN PER 10 MG: Performed by: THORACIC SURGERY (CARDIOTHORACIC VASCULAR SURGERY)

## 2022-04-04 PROCEDURE — 99024 POSTOP FOLLOW-UP VISIT: CPT | Performed by: NURSE PRACTITIONER

## 2022-04-04 PROCEDURE — 85025 COMPLETE CBC W/AUTO DIFF WBC: CPT | Performed by: THORACIC SURGERY (CARDIOTHORACIC VASCULAR SURGERY)

## 2022-04-04 RX ORDER — OMEPRAZOLE 40 MG/1
40 CAPSULE, DELAYED RELEASE ORAL 2 TIMES DAILY
Qty: 60 CAPSULE | Refills: 0 | Status: SHIPPED | OUTPATIENT
Start: 2022-04-04 | End: 2022-06-15

## 2022-04-04 RX ADMIN — Medication 10 ML: at 08:12

## 2022-04-04 RX ADMIN — Medication 10 ML: at 08:13

## 2022-04-04 RX ADMIN — ASPIRIN 81 MG: 81 TABLET, COATED ORAL at 08:09

## 2022-04-04 RX ADMIN — PANTOPRAZOLE SODIUM 40 MG: 40 INJECTION, POWDER, FOR SOLUTION INTRAVENOUS at 05:44

## 2022-04-04 RX ADMIN — ENOXAPARIN SODIUM 40 MG: 100 INJECTION SUBCUTANEOUS at 08:09

## 2022-04-04 NOTE — PLAN OF CARE
Problem: Adult Inpatient Plan of Care  Goal: Plan of Care Review  Outcome: Met  Flowsheets (Taken 4/4/2022 1134)  Progress: no change  Plan of Care Reviewed With: patient  Outcome Evaluation: vss. plan to discharge home today.   Goal Outcome Evaluation:  Plan of Care Reviewed With: patient        Progress: no change  Outcome Evaluation: vss. plan to discharge home today.

## 2022-04-04 NOTE — PLAN OF CARE
Goal Outcome Evaluation:              Outcome Evaluation: Discussed patient care with Shellie from CT surgery. Will hold on VFSS at this time. Patient tolerated diet upgrade well over the weekend per report. Will sign off, please re consult as needed.

## 2022-04-04 NOTE — CASE MANAGEMENT/SOCIAL WORK
Case Management Discharge Note      Final Note: D/C home with daughter to transport. [facility] is set up, daughter at bedside stated they had no other needs.    Provided Post Acute Provider List?: Yes  Post Acute Provider List: Inpatient Rehab, Nursing Home  N/A Provider List Comment: Daughter requested [facility] or [facility]    Selected Continued Care - Admitted Since 3/25/2022     Destination    No services have been selected for the patient.              Durable Medical Equipment    No services have been selected for the patient.              Dialysis/Infusion    No services have been selected for the patient.              Home Medical Care Coordination complete.    Service Provider Selected Services Address Phone Fax Patient Preferred     [facility]  Home Health Services [address] [address] [phone] --          Therapy    No services have been selected for the patient.              Community Resources    No services have been selected for the patient.              Community & DME    No services have been selected for the patient.                  Transportation Services  Private: Car    Final Discharge Disposition Code: 06 - home with home health care

## 2022-04-04 NOTE — DISCHARGE SUMMARY
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 female with a history of aortic stenosis, Rojas's esophagus, and Zenker's diverticulum who presented with difficulty swallowing. Please see admission H&P for further details. She was found to have a large Zenker's diverticulum which had led to esophageal obstruction. She was evaluated by thoracic surgery and after cardiology clearance she underwent Zenker's Diverticulotomy with Myotomy and esophagoscopy on 3/31/22 which she tolerated well. Her postoperative course was uneventful. Her diet was advanced and she was doing well on a regular soft diet at the time of discharge.   She had been given TPN prior to the surgery due to her poor nutrition resulting from the obstruction. This was discontinued on the day of discharge and her right upper extremity PICC line which was placed for this purpose will be removed prior to discharge. She should keep follow up with thoracic surgery as scheduled.   Of note she did develop some bipedal edema without tenderness or calf swelling. This is likely due to excess fluid from TPN and the fact that she has been ambulating pretty regularly. This  "should resolve on its own but I did  her and her daughters to seek medical attention if this worsens or becomes painful.   Given her history of Rojas's esophagus she was treated with intravenous protonix during the admission and will be prescribed omeprazole at discharge.  Her blood pressures were a little elevated during the latter part of the admission-these were mostly taken after she had been out of bed and walking. No antihypertensives were started but this should be followed as an outpatient.  She is medically stable and will be discharged home.    Exam Today:  Blood pressure 124/60, pulse 86, temperature 98.9 °F (37.2 °C), temperature source Oral, resp. rate 16, height 152.4 cm (60\"), weight 63.8 kg (140 lb 10.5 oz), SpO2 93 %.  Vitals and nursing note reviewed.   Constitutional:       General: She is not in acute distress.     Appearance: She is not toxic-appearing.      Comments: Elderly, frail   HENT:      Head: Normocephalic and atraumatic.      Nose: Nose normal.      Mouth/Throat:      Mouth: Mucous membranes are moist.      Pharynx: Oropharynx is clear.   Eyes:      Extraocular Movements: Extraocular movements intact.      Conjunctiva/sclera: Conjunctivae normal.      Pupils: Pupils are equal, round, and reactive to light.   Neck:      Trachea: Trachea normal.      Comments: Surgical wound c/d/i  Cardiovascular:      Rate and Rhythm: Normal rate and regular rhythm.      Pulses: Normal pulses.   Pulmonary:      Effort: Pulmonary effort is normal.      Breath sounds: Normal breath sounds.   Abdominal:      General: Bowel sounds are normal.      Palpations: Abdomen is soft.   Musculoskeletal:         General: 1+ BLE Edema present. No tenderness.   Skin:     General: Skin is warm and dry.      Capillary Refill: Capillary refill takes less than 2 seconds.   Neurological:      General: No focal deficit present.      Mental Status: She is alert and oriented to person, place, and time.   Psychiatric:  "        Mood and Affect: Mood normal.         Behavior: Behavior normal.     Procedures Performed:  Procedure(s):  ZENKERS DIVERTICULECTOMY WITH MYOTOMY, ESOPHAGOSCOPY      Consults:   Consults     Date and Time Order Name Status Description    3/28/2022  2:51 PM Inpatient Cardiology Consult Completed     3/26/2022  5:19 PM Inpatient Thoracic Surgery Consult Completed     3/25/2022  9:40 PM Inpatient Gastroenterology Consult Completed     3/25/2022  6:37 PM [facility] (on-call MD unless specified) Details             Discharge Disposition:  Home or Self Care    Discharge Medications:     Discharge Medications      New Medications      Instructions Start Date   omeprazole 40 MG capsule  Commonly known as: priLOSEC   40 mg, Oral, 2 Times Daily         Continue These Medications      Instructions Start Date   aspirin 81 MG tablet   81 mg, Oral, Daily      Calcium Carbonate 1500 (600 Ca) MG tablet   600 mg, Oral, Daily      cetirizine 10 MG tablet  Commonly known as: zyrTEC   10 mg, Oral, Daily      IRON PO   65 mg, Oral, Daily      multivitamin tablet tablet  Commonly known as: THERAGRAN   1 tablet, Oral, Daily      OSTEO BI-FLEX ADV TRIPLE ST PO   1 tablet, Oral, Daily      vitamin B-12 1000 MCG tablet  Commonly known as: CYANOCOBALAMIN   1,000 mcg, Oral, Daily      Vitamin D (Cholecalciferol) 25 MCG (1000 UT) capsule   1 capsule, Oral, Daily         Stop These Medications    doxycycline 100 MG capsule  Commonly known as: MONODOX     EQ Cimetidine 200 MG tablet  Generic drug: cimetidine     saccharomyces boulardii 250 MG capsule  Commonly known as: FLORASTOR     TUSSIN CF PO            Discharge Diet:   Diet Instructions     Diet: Regular, Soft Texture; Thin Liquids, No Restrictions; Chopped      Discharge Diet:  Regular  Soft Texture       Fluid Consistency: Thin Liquids, No Restrictions    Soft Options: Chopped          Activity at Discharge:   Activity Instructions     Activity as Tolerated            Follow-up  Appointments:  Future Appointments   Date Time Provider Department Center   4/26/2022  2:00 PM Chilango Louis III, MD [facility]     Additional Instructions for the Follow-ups that You Need to Schedule     Ambulatory Referral to Home Health (Hospital)   As directed      Face to Face Visit Date: 4/3/2022    Follow-up provider for Plan of Care?: I treated the patient in an acute care facility and will not continue treatment after discharge.    Follow-up provider: NUNU REGAN [[id number]]    Reason/Clinical Findings: Postoperative weakness    Describe mobility limitations that make leaving home difficult: Postop weakness    Nursing/Therapeutic Services Requested: Physical Therapy Occupational Therapy    PT orders: Home safety assessment    Frequency: 1 Week 1               Test Results Pending at Discharge:  Pending Labs     Order Current Status    Basic Metabolic Panel In process    CBC & Differential In process    CBC Auto Differential In process           Jaspal Ortiz MD  04/04/22  12:23 EDT    Time Spent on Discharge Activities: Greater than 30 minutes.

## 2022-04-04 NOTE — CONSULTS
Adult Nutrition  Assessment/PES    Patient Name:  Shaina Mcknight  YOB: 1927  MRN: [id number]  Admit Date:  3/25/2022    Assessment Date:  4/4/2022  Nutrition follow up/diet education consult regarding s/p Zenker's diverticulectomy.  Changed diet to mechanical soft from GI soft. Tolerating.  Provided nutrition education-reinforced the importance of soft texture foods. Gave education material. Ordered nutritional supplement-boost daily.  RD to continue to follow.     Reason for Assessment     Row Name 04/04/22 1025          Reason for Assessment    Reason For Assessment nurse/nurse practitioner consult     Diagnosis  POD4 ZENKERS DIVERTICULECTOMY WITH MYOTOMY, ESOPHAGOSCOPY     Identified At Risk by Screening Criteria need for education                Nutrition/Diet History     Row Name 04/04/22 1026          Nutrition/Diet History    Typical Intake (Food/Fluid/EN/PN) tolerating mech soft diet                Anthropometrics     Row Name 04/04/22 0700          Anthropometrics    Weight 63.8 kg (140 lb 10.5 oz)                Labs/Tests/Procedures/Meds     Row Name 04/04/22 1026          Labs/Procedures/Meds    Lab Results Reviewed reviewed, pertinent     Lab Results Comments Na            Diagnostic Tests/Procedures    Diagnostic Test/Procedure Reviewed reviewed, pertinent            Medications    Pertinent Medications Reviewed reviewed, pertinent     Pertinent Medications Comments PPI, NaCl, TPN-d/c today                  Estimated/Assessed Needs - Anthropometrics     Row Name 04/04/22 0700          Anthropometrics    Weight 63.8 kg (140 lb 10.5 oz)                Nutrition Prescription Ordered     Row Name 04/04/22 1026          Nutrition Prescription PO    Current PO Diet Dysphagia     Dysphagia Level 4  Mechanical soft no mixed consistencies     Fluid Consistency Thin                Evaluation of Received Nutrient/Fluid Intake     Row Name 04/04/22 1026          PO Evaluation    Number of Meals  2     % PO Intake 50                     Problem/Interventions:           Intervention Goal     Row Name 04/04/22 1026          Intervention Goal    General Maintain nutrition;Disease management/therapy     PO Tolerate PO;Increase intake     Weight Maintain weight                Nutrition Intervention     Row Name 04/04/22 1027          Nutrition Intervention    RD/Tech Action Follow Tx progress;Care plan reviewd;Interview for preference;Encourage intake;Recommend/ordered     Recommended/Ordered Supplement                Nutrition Prescription     Row Name 04/04/22 1027          Nutrition Prescription PO    PO Prescription Begin/change supplement     Supplement Boost     Supplement Frequency Daily     New PO Prescription Ordered? Yes                Education/Evaluation     Row Name 04/04/22 1027          Education    Education Provided education regarding     Provided education regarding --  ZENKERS DIVERTICULECTOMY WITH MYOTOMY, ESOPHAGOSCOPY nutrition therapy            Monitor/Evaluation    Monitor Per protocol;I&O;PO intake;Supplement intake;Pertinent labs;PN delivery/tolerance;Weight;Skin status;Symptoms                 Electronically signed by:  Cheri Chang RD  04/04/22 10:27 EDT

## 2022-04-04 NOTE — PROGRESS NOTES
"  POST-OPERATIVE NOTE     Chief Complaint: Zenker's diverticulum  S/P: ZENKERS DIVERTICULECTOMY WITH MYOTOMY, ESOPHAGOSCOPY  POD # 3    Subjective:  Symptoms:  Stable.  She reports weakness.  No shortness of breath, chest pain or chest pressure.    Diet:  NPO.  No nausea or vomiting.    Activity level: Impaired due to weakness.    Pain:  She complains of pain that is mild.  She reports pain is improving.  Pain is partially controlled.    Resting in bed. Feeling well. Tolerating soft diet. Eager for discharge.     Objective:  General Appearance:  Comfortable, in no acute distress and well-appearing.    Vital signs: (most recent): Blood pressure 161/78, pulse 90, temperature 98.1 °F (36.7 °C), temperature source Oral, resp. rate 16, height 152.4 cm (60\"), weight 63.8 kg (140 lb 10.5 oz), SpO2 93 %.  Vital signs are normal.    HEENT: Normal HEENT exam.    Lungs:  Normal effort.  She is not in respiratory distress.  There are decreased breath sounds.    Heart: Normal rate.    Chest: No chest wall tenderness.    Extremities: Decreased range of motion.  There is no dependent edema.    Pulses: Distal pulses are intact.    Neurological: Patient is alert.    Skin:  Warm and dry.  (Postoperative incision to left neck is well approximated with Dermabond intact.  Dressing to ANH site without purulence or erythema.)            Results Review:     I reviewed the patient's new clinical results.  I reviewed the patient's new imaging results and agree with the interpretation.  I reviewed the patient's other test results and agree with the interpretation  Discussed with Patient, RN and Dr. Louis    Assessment/Plan     Ms. Mcknight is POD #4 s/p Zenker's diverticulectomy. She has done very well postoperatively and is tolerating a soft diet without difficulty.  Plans noted to wean her TPA today.  She is stable for discharge from our standpoint. Post-operative instructions discussed with patient and daughter at length. Follow-up " appointment has been arranged for her with Dr. Louis on  April 26.  Home health ordered per family request.    Thank you for letting us partake in the care of Ms. Mcknight.       Shellie Ugarte DNP, APRN  [facility]  04/04/22  09:23 EDT    Patient was seen and assessed while wearing personal protective equipment including facemask, protective eyewear and gloves.  Hand hygiene performed prior to entering the room and upon exiting with doffing of gloves.

## 2022-04-04 NOTE — PLAN OF CARE
Problem: Adult Inpatient Plan of Care  Goal: Plan of Care Review  Outcome: Ongoing, Progressing  Flowsheets (Taken 4/4/2022 0517)  Progress: improving  Plan of Care Reviewed With: patient  Outcome Evaluation:   VSS   AOx4. Pt remains on RA. Pt had no complaints of pain during shift. Pt continues on TPN/lipids that were reduced to 40ml hr   should be able to d/c today per MD note. Pt continues on GI soft diet. ANH drain removed   dressing dry clean and intact. Incision site to neck remains clean with no drainage. Family remained at beside through the night   plan to potentially d/c home with family. Will continue to monitor and support.  Goal: Patient-Specific Goal (Individualized)  Outcome: Ongoing, Progressing  Goal: Absence of Hospital-Acquired Illness or Injury  Outcome: Ongoing, Progressing  Intervention: Identify and Manage Fall Risk  Recent Flowsheet Documentation  Taken 4/4/2022 0400 by Smita Brock RN  Safety Promotion/Fall Prevention:   activity supervised   clutter free environment maintained   gait belt   nonskid shoes/slippers when out of bed   room organization consistent   safety round/check completed  Taken 4/4/2022 0200 by Smita Brock, RN  Safety Promotion/Fall Prevention:   activity supervised   clutter free environment maintained   gait belt   nonskid shoes/slippers when out of bed   room organization consistent   safety round/check completed  Taken 4/4/2022 0000 by Smita Brock, RN  Safety Promotion/Fall Prevention:   activity supervised   clutter free environment maintained   gait belt   nonskid shoes/slippers when out of bed   safety round/check completed   room organization consistent  Taken 4/3/2022 2200 by Smita Brock, RN  Safety Promotion/Fall Prevention:   activity supervised   clutter free environment maintained   gait belt   nonskid shoes/slippers when out of bed   room organization consistent   safety round/check completed  Taken 4/3/2022 2000 by Smita Brock  RN  Safety Promotion/Fall Prevention:   activity supervised   clutter free environment maintained   gait belt   nonskid shoes/slippers when out of bed   room organization consistent   safety round/check completed  Intervention: Prevent Skin Injury  Recent Flowsheet Documentation  Taken 4/4/2022 0400 by Smita Brock RN  Body Position:   right   tilted   position changed independently  Taken 4/4/2022 0200 by Smita Brock RN  Body Position:   side-lying   left   position changed independently  Taken 4/4/2022 0000 by Smita Brock RN  Body Position:   right   position changed independently   tilted  Taken 4/3/2022 2200 by Smita Brock RN  Body Position:   position changed independently   left   tilted  Taken 4/3/2022 2000 by Smita Brock RN  Body Position:   position changed independently   tilted   left  Intervention: Prevent and Manage VTE (Venous Thromboembolism) Risk  Recent Flowsheet Documentation  Taken 4/4/2022 0400 by Smita Brock RN  Activity Management: activity adjusted per tolerance  Taken 4/4/2022 0200 by Smita Brock RN  Activity Management: activity adjusted per tolerance  Taken 4/4/2022 0000 by Smita Brock RN  Activity Management: activity adjusted per tolerance  Taken 4/3/2022 2200 by Smita Brock RN  Activity Management: activity adjusted per tolerance  Taken 4/3/2022 2000 by Smita Brock RN  Activity Management: activity adjusted per tolerance  VTE Prevention/Management:   sequential compression devices on   dorsiflexion/plantar flexion performed  Intervention: Prevent Infection  Recent Flowsheet Documentation  Taken 4/4/2022 0400 by Smita Brock RN  Infection Prevention:   environmental surveillance performed   hand hygiene promoted   rest/sleep promoted   single patient room provided  Taken 4/4/2022 0200 by Smita Brock RN  Infection Prevention:   single patient room provided   rest/sleep promoted   hand hygiene promoted   environmental  surveillance performed  Taken 4/4/2022 0000 by Smita Brock RN  Infection Prevention:   rest/sleep promoted   single patient room provided   hand hygiene promoted   environmental surveillance performed  Taken 4/3/2022 2200 by Smita Brock RN  Infection Prevention:   hand hygiene promoted   rest/sleep promoted   single patient room provided   environmental surveillance performed  Taken 4/3/2022 2000 by Smita Brock RN  Infection Prevention:   rest/sleep promoted   single patient room provided   hand hygiene promoted   environmental surveillance performed  Goal: Optimal Comfort and Wellbeing  Outcome: Ongoing, Progressing  Intervention: Provide Person-Centered Care  Recent Flowsheet Documentation  Taken 4/4/2022 0200 by Smita Brock RN  Trust Relationship/Rapport: care explained  Goal: Readiness for Transition of Care  Outcome: Ongoing, Progressing     Problem: Fall Injury Risk  Goal: Absence of Fall and Fall-Related Injury  Outcome: Ongoing, Progressing  Intervention: Identify and Manage Contributors  Recent Flowsheet Documentation  Taken 4/4/2022 0400 by Smita Brock RN  Medication Review/Management: medications reviewed  Taken 4/4/2022 0200 by Smita Brock RN  Medication Review/Management: medications reviewed  Taken 4/4/2022 0000 by Smita Brock RN  Medication Review/Management: medications reviewed  Taken 4/3/2022 2200 by Smita Brock RN  Medication Review/Management: medications reviewed  Taken 4/3/2022 2000 by Smita Brock RN  Medication Review/Management: medications reviewed  Intervention: Promote Injury-Free Environment  Recent Flowsheet Documentation  Taken 4/4/2022 0400 by Smita Brock, RN  Safety Promotion/Fall Prevention:   activity supervised   clutter free environment maintained   gait belt   nonskid shoes/slippers when out of bed   room organization consistent   safety round/check completed  Taken 4/4/2022 0200 by Smita Brock, PRO  Safety  Promotion/Fall Prevention:   activity supervised   clutter free environment maintained   gait belt   nonskid shoes/slippers when out of bed   room organization consistent   safety round/check completed  Taken 4/4/2022 0000 by Smita Brock RN  Safety Promotion/Fall Prevention:   activity supervised   clutter free environment maintained   gait belt   nonskid shoes/slippers when out of bed   safety round/check completed   room organization consistent  Taken 4/3/2022 2200 by Smita Brock RN  Safety Promotion/Fall Prevention:   activity supervised   clutter free environment maintained   gait belt   nonskid shoes/slippers when out of bed   room organization consistent   safety round/check completed  Taken 4/3/2022 2000 by Smita Brock RN  Safety Promotion/Fall Prevention:   activity supervised   clutter free environment maintained   gait belt   nonskid shoes/slippers when out of bed   room organization consistent   safety round/check completed     Problem: Swallowing Impairment  Goal: Optimal Eating and Swallowing Without Aspiration  Outcome: Ongoing, Progressing     Problem: Skin Injury Risk Increased  Goal: Skin Health and Integrity  Outcome: Ongoing, Progressing  Intervention: Optimize Skin Protection  Recent Flowsheet Documentation  Taken 4/4/2022 0400 by Smita Brock RN  Head of Bed (HOB) Positioning: HOB elevated  Taken 4/4/2022 0200 by Smita Brock RN  Head of Bed (HOB) Positioning: HOB lowered  Taken 4/4/2022 0000 by Smita Brock RN  Head of Bed (HOB) Positioning: HOB elevated  Taken 4/3/2022 2200 by Smita Brock RN  Head of Bed (HOB) Positioning: HOB elevated  Taken 4/3/2022 2000 by Smita Brock RN  Head of Bed (HOB) Positioning: HOB elevated   Goal Outcome Evaluation:  Plan of Care Reviewed With: patient        Progress: improving  Outcome Evaluation: VSS; AOx4. Pt remains on RA. Pt had no complaints of pain during shift. Pt continues on TPN/lipids that were reduced to  40ml hr; should be able to d/c today per MD note. Pt continues on GI soft diet. ANH drain removed; dressing dry clean and intact. Incision site to neck remains clean with no drainage. Family remained at beside through the night; plan to potentially d/c home with family. Will continue to monitor and support.

## 2022-04-04 NOTE — PROGRESS NOTES
[facility] will follow post hospital as requested. Patient/daughter agreeable to service. Contact information confirmed. Best to call daughter on cell # [phone] at least initially to schedule the home health visits.

## 2022-04-05 ENCOUNTER — HOME CARE VISIT (OUTPATIENT)
Dept: HOME HEALTH SERVICES | Facility: HOME HEALTHCARE | Age: 87
End: 2022-04-05

## 2022-04-05 NOTE — OUTREACH NOTE
Prep Survey    Flowsheet Row Responses   [facility] discharged [facility]? [facility]   Is LACE score < 7 ? No   Emergency Room discharge w/ pulse ox? No   Eligibility Readm Mgmt   Discharge diagnosis Esophageal dysphagia s/p Zenker's Diverticulotomy with Myotomy and esophagoscopy    Does the patient have one of the following disease processes/diagnoses(primary or secondary)? General Surgery   Does the patient have Home health ordered? Yes   What is the Home health agency?  [facility]   Is there a DME ordered? No   Prep survey completed? Yes          DOTTY Montalvo Registered Nurse

## 2022-04-07 ENCOUNTER — READMISSION MANAGEMENT (OUTPATIENT)
Dept: CALL CENTER | Facility: HOSPITAL | Age: 87
End: 2022-04-07

## 2022-04-07 NOTE — OUTREACH NOTE
General Surgery Week 1 Survey    Flowsheet Row Responses   [facility], [place], operated by [facility] patient discharged from? [place]   Does the patient have one of the following disease processes/diagnoses(primary or secondary)? General Surgery   Week 1 attempt successful? Yes   Call start time 1303   Call end time 1309   Discharge diagnosis Esophageal dysphagia s/p Zenker's Diverticulotomy with Myotomy and esophagoscopy    Is patient permission given to speak with other caregiver? Yes   List who call center can speak with Daughter--Betzy   Person spoke with today (if not patient) and relationship Daughter and pt   Meds reviewed with patient/caregiver? Yes   Is the patient having any side effects they believe may be caused by any medication additions or changes? No   Does the patient have all medications related to this admission filled (includes all antibiotics, pain medications, etc.) Yes   Is the patient taking all medications as directed (includes completed medication regime)? Yes   Does the patient have a follow up appointment scheduled with their surgeon? Yes   Has the patient kept scheduled appointments due by today? N/A   Comments appts in place.    What is the Home health agency?  [facility]   Has home health visited the patient within 72 hours of discharge? Yes   Home health comments  has come in to see her--came in on Tuesday. PT-- states she does not need any therapy   Psychosocial issues? No   Did the patient receive a copy of their discharge instructions? Yes   Nursing interventions Reviewed instructions with patient   What is the patient's perception of their health status since discharge? Improving   Nursing interventions Nurse provided patient education   Is the patient /caregiver able to teach back basic post-op care? Drive as instructed by MD in discharge instructions, Take showers only when approved by MD-sponge bathe until then, No tub bath, swimming, or hot tub until instructed by MD, Keep incision areas clean,dry and  protected, Lifting as instructed by MD in discharge instructions   Is the patient/caregiver able to teach back signs and symptoms of incisional infection? Increased redness, swelling or pain at the incisonal site, Increased drainage or bleeding, Incisional warmth, Pus or odor from incision, Fever   Is the patient/caregiver able to teach back steps to recovery at home? Rest and rebuild strength, gradually increase activity, Eat a well-balance diet   If the patient is a current smoker, are they able to teach back resources for cessation? Not a smoker   Is the patient/caregiver able to teach back the hierarchy of who to call/visit for symptoms/problems? PCP, Specialist, Home health nurse, Urgent Care, ED, 911 Yes   Additional teach back comments Doing great and incision looks really good. Glue to hold incision together.    Week 1 call completed? Yes          DELL ALVAREZ - Registered Nurse

## 2022-04-08 ENCOUNTER — NURSE TRIAGE (OUTPATIENT)
Dept: CALL CENTER | Facility: HOSPITAL | Age: 87
End: 2022-04-08

## 2022-04-08 NOTE — TELEPHONE ENCOUNTER
Reason for Disposition  • Skin tape (e.g., Steri-strips) removal, questions about    Additional Information  • Negative: [1] Major abdominal surgical incision AND [2] wound gaping open AND [3] visible internal organs  • Negative: Sounds like a life-threatening emergency to the triager  • Negative: Patient has a Negative Pressure Wound Therapy device  • Negative: Patient is followed by a wound clinic or wound specialist for this wound  • Negative: [1] Bleeding from incision AND [2] won't stop after 10 minutes of direct pressure  • Negative: [1] Widespread rash AND [2] bright red, sunburn-like  • Negative: Severe pain in the incision  • Negative: [1] Incision gaping open AND [2] < 48 hours since wound re-opened  • Negative: [1] Incision gaping open AND [2] length of opening > 2 inches (5 cm)  • Negative: Patient sounds very sick or weak to the triager  • Negative: Sounds like a serious complication to the triager  • Negative: Fever > 100.4 F (38.0 C)  • Negative: [1] Incision looks infected (spreading redness, pain) AND [2] fever > 99.5 F (37.5 C)  • Negative: [1] Incision looks infected (spreading redness, pain) AND [2] large red area (> 2 in. or 5 cm)  • Negative: [1] Incision looks infected (spreading redness, pain) AND [2] face wound  • Negative: [1] Red streak runs from the incision AND [2] longer than 1 inch (2.5 cm)  • Negative: [1] Pus or bad-smelling fluid draining from incision AND [2] no fever  • Negative: [1] Post-op pain AND [2] not controlled with pain medications  • Negative: Dressing soaked with blood or body fluid (e.g., drainage)  • Negative: [1] Raised bruise and [2] size > 2 inches (5 cm) and expanding  • Negative: [1] Caller has URGENT question AND [2] triager unable to answer question  • Negative: [1] INCREASING pain in incision AND [2] > 2 days (48 hours) since surgery  • Negative: [1] Small red area or streak AND [2] no fever  • Negative: [1] Clear or blood-tinged fluid draining from wound  "AND [2] no fever  • Negative: [1] Incision gaping open AND [2] > 48 hours since wound re-opened AND [3] length of opening > 1/2 inch (12 mm)  • Negative: [1] Incision on face gaping open after skin glue AND [2] > 48 hours since wound re-opened AND [3] length of opening > 1/4 inch (6 mm)  • Negative: Suture or staple removal is overdue  • Negative: [1] Suture or staple came out early AND [2] caller wants wound checked  • Negative: [1] Caller has NON-URGENT question AND [2] triager unable to answer question  • Negative: Pimple where a stitch comes through the skin  • Negative: Suture (or staple) came out early  • Negative: Mild bruising near incision site  • Negative: Suture removal date, questions about    Answer Assessment - Initial Assessment Questions  1. SYMPTOM: \"What's the main symptom you're concerned about?\" (e.g., redness, pain, drainage)  Pulling at incision site incision is glued   2. ONSET: \"When did *No Answer*  start?\"     Since surgery  3. SURGERY: \"What surgery was performed?\"    Zenker's Diverticulotomy with Myotomy and esophagoscopy on 3/31/22   4. DATE of SURGERY: \"When was surgery performed?\"   03/31/2022  5. INCISION SITE: \"Where is the incision located?\"      Left neck edges are approximated.  6. REDNESS: \"Is there any redness at the incision site?\" If yes, ask: \"How wide across is the redness?\" (Inches, centimeters)       *No Answer*  7. PAIN: \"Is there any pain?\" If Yes, ask: \"How bad is it?\"  (Scale 1-10; or mild, moderate, severe)  Denies pain  8. BLEEDING: \"Is there any bleeding?\" If Yes, ask: \"How much?\" and \"Where?\"      no  9. DRAINAGE: \"Is there any drainage from the incision site?\" If yes, ask: \"What color and how much?\" (e.g., red, cloudy, pus; drops, teaspoon)    No drainage no  s/s infection  10. FEVER: \"Do you have a fever?\" If Yes, ask: \"What is your temperature, how was it measured, and when did it start?\"  na  11. OTHER SYMPTOMS: \"Do you have any other symptoms?\" (e.g., shaking " chills, weakness, rash elsewhere on body)  na    Protocols used: POST-OP INCISION SYMPTOMS AND QUESTIONS-ADULT-AH

## 2022-04-14 ENCOUNTER — READMISSION MANAGEMENT (OUTPATIENT)
Dept: CALL CENTER | Facility: HOSPITAL | Age: 87
End: 2022-04-14

## 2022-04-14 NOTE — OUTREACH NOTE
General Surgery Week 2 Survey    Flowsheet Row Responses   [facility] patient discharged from? [place]   Does the patient have one of the following disease processes/diagnoses(primary or secondary)? General Surgery   Week 2 attempt successful? Yes   Call start time 1547   Call end time 1550   Discharge diagnosis Esophageal dysphagia s/p Zenker's Diverticulotomy with Myotomy and esophagoscopy    Is patient permission given to speak with other caregiver? Yes   List who call center can speak with Gricelda   Person spoke with today (if not patient) and relationship Kathleentyler   Meds reviewed with patient/caregiver? Yes   Is the patient having any side effects they believe may be caused by any medication additions or changes? No   Does the patient have all medications related to this admission filled (includes all antibiotics, pain medications, etc.) Yes   Is the patient taking all medications as directed (includes completed medication regime)? Yes   Does the patient have a follow up appointment scheduled with their surgeon? Yes   Has the patient kept scheduled appointments due by today? N/A   Comments Surgery follow up 4/26/22   What is the [facility] health agency?  [facility]   Has home health visited the patient within 72 hours of discharge? Yes   Psychosocial issues? No   Nursing interventions Reviewed instructions with patient   What is the patient's perception of their health status since discharge? Improving   Nursing interventions Nurse provided patient education   Is the patient/caregiver able to teach back signs and symptoms of incisional infection? Fever, Pus or odor from incision, Increased drainage or bleeding   Is the patient/caregiver able to teach back steps to recovery at home? Set small, achievable goals for return to baseline health, Rest and rebuild strength, gradually increase activity, Eat a well-balance diet   If the patient is a current smoker, are they able to teach back resources for  cessation? Not a smoker   Is the patient/caregiver able to teach back the hierarchy of who to call/visit for symptoms/problems? PCP, Specialist, Home health nurse, Urgent Care, ED, 911 Yes   Week 2 call completed? Yes          SKIP GOTTI - Registered Nurse

## 2022-04-19 ENCOUNTER — TRANSCRIBE ORDERS (OUTPATIENT)
Dept: ADMINISTRATIVE | Facility: HOSPITAL | Age: 87
End: 2022-04-19

## 2022-04-19 ENCOUNTER — HOSPITAL ENCOUNTER (OUTPATIENT)
Dept: GENERAL RADIOLOGY | Facility: HOSPITAL | Age: 87
Discharge: HOME | End: 2022-04-19

## 2022-04-19 DIAGNOSIS — M54.6 PAIN IN THORACIC SPINE: ICD-10-CM

## 2022-04-19 DIAGNOSIS — M54.9 BACK PAIN WITH RADIATION: Primary | ICD-10-CM

## 2022-04-19 DIAGNOSIS — R93.89 ABNORMAL FINDINGS ON IMAGING TEST: ICD-10-CM

## 2022-04-19 PROCEDURE — 72072 X-RAY EXAM THORAC SPINE 3VWS: CPT

## 2022-04-19 PROCEDURE — 71046 X-RAY EXAM CHEST 2 VIEWS: CPT

## 2022-04-20 ENCOUNTER — HOSPITAL ENCOUNTER (OUTPATIENT)
Dept: CT IMAGING | Facility: HOSPITAL | Age: 87
Discharge: HOME | End: 2022-04-20
Admitting: INTERNAL MEDICINE

## 2022-04-20 DIAGNOSIS — R93.89 ABNORMAL FINDINGS ON IMAGING TEST: ICD-10-CM

## 2022-04-20 DIAGNOSIS — M54.9 BACK PAIN WITH RADIATION: ICD-10-CM

## 2022-04-20 PROCEDURE — 72128 CT CHEST SPINE W/O DYE: CPT

## 2022-04-25 ENCOUNTER — READMISSION MANAGEMENT (OUTPATIENT)
Dept: CALL CENTER | Facility: HOSPITAL | Age: 87
End: 2022-04-25

## 2022-04-25 NOTE — OUTREACH NOTE
General Surgery Week 3 Survey    Flowsheet Row Responses   [facility] patient discharged from? [place]   Does the patient have one of the following disease processes/diagnoses(primary or secondary)? General Surgery   Week 3 attempt successful? Yes   Call start time 1036   Call end time 1042   Discharge diagnosis Esophageal dysphagia s/p Zenker's Diverticulotomy with Myotomy and esophagoscopy    Meds reviewed with patient/caregiver? Yes   Is the patient taking all medications as directed (includes completed medication regime)? Yes   Has the patient kept scheduled appointments due by today? Yes   Psychosocial issues? No   What is the patient's perception of their health status since discharge? New symptoms unrelated to diagnosis  [Compressed Fracture in back. Taking ES Tylenol. ]   Is the patient/caregiver able to teach back signs and symptoms of incisional infection? Increased redness, swelling or pain at the incisonal site, Increased drainage or bleeding, Incisional warmth   Is the patient/caregiver able to teach back steps to recovery at home? Rest and rebuild strength, gradually increase activity, Eat a well-balance diet  [She is eating better and hopes to be eating what she would like after having visit with surgeon coming up. ]   Is the patient/caregiver able to teach back the hierarchy of who to call/visit for symptoms/problems? PCP, Specialist, Home health nurse, Urgent Care, ED, 911 Yes   Week 3 call completed? Yes          DELL ALVAREZ - Registered Nurse

## 2022-04-26 ENCOUNTER — OFFICE VISIT (OUTPATIENT)
Dept: SURGERY | Facility: CLINIC | Age: 87
End: 2022-04-26

## 2022-04-26 VITALS
DIASTOLIC BLOOD PRESSURE: 72 MMHG | SYSTOLIC BLOOD PRESSURE: 128 MMHG | HEIGHT: 60 IN | HEART RATE: 72 BPM | OXYGEN SATURATION: 95 % | BODY MASS INDEX: 27.48 KG/M2 | WEIGHT: 140 LBS

## 2022-04-26 DIAGNOSIS — K22.5 ZENKER'S DIVERTICULUM: Primary | ICD-10-CM

## 2022-04-26 DIAGNOSIS — Z09 FOLLOW-UP EXAMINATION FOLLOWING SURGERY: ICD-10-CM

## 2022-04-26 PROCEDURE — 99024 POSTOP FOLLOW-UP VISIT: CPT | Performed by: THORACIC SURGERY (CARDIOTHORACIC VASCULAR SURGERY)

## 2022-04-26 NOTE — PROGRESS NOTES
"Answers for HPI/ROS submitted by the patient on 4/19/2022  Please describe your symptoms.: Post op checkup  Have you had these symptoms before?: Yes  How long have you been having these symptoms?: Greater than 2 weeks  Please describe any probable cause for these symptoms. : surgery  What is the primary reason for your visit?: Other    Chief Complaint  First postoperative visit    Subjective          Shaina Mcknight presents to [facility] THORACIC SURGERY  History of Present Illness     Ms Mcknight was seen in the office today for her first follow-up visit following a Zenker's diverticulectomy with myotomy performed March 31, 2022.  Postoperative course was uneventful.  Postoperative esophagram showed no leak and good flow of contrast through the esophagus.  Patient has been taking a soft diet since discharge.  She has had no coughing or choking with swallowing either solid food or liquids.  She has been having some low back pain and has been found to have a new compression fracture.  This began about a week and a half after her surgery.  Do not think that this was related to positioning in the operating room.    Objective   Vital Signs:   /72 (BP Location: Right arm, Patient Position: Sitting, Cuff Size: Adult)   Pulse 72   Ht 152.4 cm (60\")   Wt 63.5 kg (140 lb)   SpO2 95%   BMI 27.34 kg/m²     Physical Exam     Neck: Incision is well-healed.  No masses.  No adenopathy.  Limited range of motion but unchanged from preoperative.    Pulmonary: Lungs are clear to auscultation with equal breath sounds bilaterally    Result Review :   The following data was reviewed by: Chilango Louis III, MD on 04/26/2022:      Chest x-ray performed April 19, 2022 was reviewed.  Lungs are fully expanded.  No pneumothorax.  No pleural effusions.  No pulmonary infiltrate.     Assessment and Plan    Diagnoses and all orders for this visit:    1. Zenker's diverticulum (Primary)    2. Follow-up examination " following surgery      BMI is >= 25 and < 30. (Overweight) The following options were offered after discussion: referral to primary care      Patient has made a good recovery from her surgery.  She is eating and drinking without any problems.  She may resume a regular diet.  She may resume all normal activities without restrictions.  We will see her back in our office as needed.  Thank you for allowing us to participate in the care of Ms. Mcknight.     I spent 17 minutes caring for Shaina on this date of service. This time includes time spent by me in the following activities:preparing for the visit, reviewing tests, performing a medically appropriate examination and/or evaluation , counseling and educating the patient/family/caregiver, ordering medications, tests, or procedures, referring and communicating with other health care professionals , documenting information in the medical record, independently interpreting results and communicating that information with the patient/family/caregiver and care coordination  Follow Up   Return if symptoms worsen or fail to improve.  Patient was given instructions and counseling regarding her condition or for health maintenance advice. Please see specific information pulled into the AVS if appropriate.

## 2022-04-28 ENCOUNTER — OFFICE VISIT (OUTPATIENT)
Dept: CARDIOLOGY | Facility: CLINIC | Age: 87
End: 2022-04-28

## 2022-04-28 VITALS
SYSTOLIC BLOOD PRESSURE: 120 MMHG | HEIGHT: 60 IN | DIASTOLIC BLOOD PRESSURE: 78 MMHG | BODY MASS INDEX: 25.32 KG/M2 | HEART RATE: 81 BPM | WEIGHT: 129 LBS

## 2022-04-28 DIAGNOSIS — I35.0 NONRHEUMATIC AORTIC VALVE STENOSIS: Primary | Chronic | ICD-10-CM

## 2022-04-28 DIAGNOSIS — R06.09 DYSPNEA ON EXERTION: Chronic | ICD-10-CM

## 2022-04-28 DIAGNOSIS — I44.0 FIRST DEGREE AV BLOCK: ICD-10-CM

## 2022-04-28 DIAGNOSIS — I65.23 BILATERAL CAROTID ARTERY STENOSIS: ICD-10-CM

## 2022-04-28 PROBLEM — J96.01 ACUTE RESPIRATORY FAILURE WITH HYPOXIA: Status: RESOLVED | Noted: 2018-11-04 | Resolved: 2022-04-28

## 2022-04-28 PROCEDURE — 93000 ELECTROCARDIOGRAM COMPLETE: CPT | Performed by: NURSE PRACTITIONER

## 2022-04-28 PROCEDURE — 99214 OFFICE O/P EST MOD 30 MIN: CPT | Performed by: NURSE PRACTITIONER

## 2022-04-28 NOTE — PROGRESS NOTES
Date of Office Visit: 2022  Encounter Provider: SONDRA Jimenez  Place of Service: [facility] CARDIOLOGY  Patient Name: Shaina Mcknight  :10/4/1927  Primary Cardiologist: Dr. Braulio Sweet     Chief Complaint   Patient presents with   • Aortic Stenosis   • Hospital Follow Up Visit   :     HPI: Shaina Mcknight is a pleasant 89 y.o. female who presents today for follow-up on aortic stenosis and recent hospitalization.  She is a new patient to me and I have reviewed her medical records.    She has been diagnosed with a heart murmur since her early adulthood and aortic stenosis.  In 2021, echo showed EF of 59%, moderate to severe aortic stenosis, moderate LVH, and small left ventricular chamber size.  Carotid duplex showed moderate stenosis on the left and mild on the right.    She has been diagnosed with Rojas's esophagus and Zenker's diverticulum.  In 2022, she presented the hospital with difficulty swallowing and was found to have a large Zenker's diverticulum which led to esophageal obstruction.  Cardiology was consulted and felt that she was at acceptable risk to proceed with surgical intervention.  She underwent Zenker's diverticuli to me with myotomy and esophagoscopy and did well.  She developed some bilateral pedal edema due to excess fluid from the TPN.  Echocardiogram showed normal LVEF, grade 1 diastolic dysfunction, mild aortic valve calcification, and mild to moderate aortic stenosis.  Carotid duplex showed mild right carotid stenosis and left moderate stenosis.    She presents today with her daughter accompanying her.  She reports dyspnea with exertion when going for walks and often have to stop.  She said she had severe RSV and pneumonia in 2018 and has continued to experience dyspnea since then.  Dr. Sweet has noted this before in his office notes and it was mentioned that she has bronchospastic lung disease and wears nocturnal  oxygen per pulmonary.    I reviewed with her the recent echocardiogram results.  She says she is doing better since the hospitalization and now can eat.  She suffers from chronic back pain.  She denies chest pain, PND, orthopnea, edema, palpitations, dizziness, syncope, or bleeding.  Her blood pressure was better on the second check.      Past Medical History:   Diagnosis Date   • Acute pain of left shoulder due to trauma    • Anemia 1950   • Aortic valve regurgitation     trace   • Aortic valve stenosis     mild   • Rojas esophagus 2018   • Bilateral carotid artery stenosis    • Cancer (HCC) 1990    some skin cancers   • SPENCER (dyspnea on exertion)    • Esophageal varices (HCC) 2018   • Fall     going up the steps   • GERD (gastroesophageal reflux disease) 2018   • Heart murmur    • Mitral valve regurgitation     mild to moderate   • Pulmonary valve regurgitation     trace   • Tricuspid valve regurgitation     mild to moderate       Past Surgical History:   Procedure Laterality Date   • CATARACT EXTRACTION Bilateral    • HYSTERECTOMY     • TONSILLECTOMY AND ADENOIDECTOMY     • ZENKERS DIVERTICULECTOMY N/A 3/31/2022    Procedure: ZENKERS DIVERTICULECTOMY WITH MYOTOMY, ESOPHAGOSCOPY;  Surgeon: Chilango Louis III, MD;  Location: [facility];  Service: Thoracic;  Laterality: N/A;       Social History     Socioeconomic History   • Marital status:    Tobacco Use   • Smoking status: Never Smoker   • Smokeless tobacco: Never Used   • Tobacco comment: caffine use   Vaping Use   • Vaping Use: Never used   Substance and Sexual Activity   • Alcohol use: Yes     Alcohol/week: 5.0 standard drinks     Types: 5 Glasses of wine per week     Comment: daily  4 ounce wine   • Drug use: No   • Sexual activity: Defer       Family History   Problem Relation Age of Onset   • Cancer Mother         bladder   • Heart attack Father    • Heart disease Father    • Sudden death Father    • Leukemia Father    • Heart attack Brother   "  • Heart disease Brother    • Sudden death Brother    • Cancer Son        The following portion of the patient's history were reviewed and updated as appropriate: past medical history, past surgical history, past social history, past family history, allergies, current medications, and problem list.    Review of Systems   Constitutional: Negative.   Cardiovascular: Positive for dyspnea on exertion.   Respiratory: Positive for cough.    Hematologic/Lymphatic: Negative.    Neurological: Negative.        Allergies   Allergen Reactions   • Molds & Smuts Shortness Of Breath   • Penicillins Hives   • Tessalon [Benzonatate] Delirium   • Oyster Shell Diarrhea         Current Outpatient Medications:   •  aspirin 81 MG tablet, Take 81 mg by mouth Daily., Disp: , Rfl:   •  Calcium Carbonate 1500 (600 Ca) MG tablet, Take 600 mg by mouth Daily., Disp: , Rfl:   •  IRON PO, Take 65 mg by mouth Daily., Disp: , Rfl:   •  Misc Natural Products (OSTEO BI-FLEX ADV TRIPLE ST PO), Take 1 tablet by mouth Daily., Disp: , Rfl:   •  MULTIPLE VITAMIN PO, Take 1 tablet by mouth Daily., Disp: , Rfl:   •  omeprazole (priLOSEC) 40 MG capsule, Take 1 capsule by mouth 2 (Two) Times a Day., Disp: 60 capsule, Rfl: 0  •  vitamin B-12 (CYANOCOBALAMIN) 1000 MCG tablet, Take 1,000 mcg by mouth Daily., Disp: , Rfl:   •  Vitamin D, Cholecalciferol, 1000 units capsule, Take 1 capsule by mouth Daily., Disp: , Rfl:         Objective:     Vitals:    04/28/22 1305 04/28/22 1317 04/28/22 1335   BP: 164/68 166/70 120/78   BP Location: Left arm Right arm Left arm   Patient Position:   Sitting   Pulse: 81     Weight: 58.5 kg (129 lb)     Height: 152.4 cm (60\")       Body mass index is 25.19 kg/m².    PHYSICAL EXAM:    Vitals Reviewed.   General Appearance: No acute distress, well developed and well nourished.   Eyes: Conjunctiva and lids: No erythema, swelling, or discharge. Sclera non-icteric. Glasses.   HENT: Atraumatic, normocephalic. External eyes, ears, and " nose normal. No hearing loss noted. Mucous membranes normal. Lips not cyanotic. Neck supple with no tenderness. Wearing mask.   Respiratory: No signs of respiratory distress. Respiration rhythm and depth normal.   Clear to auscultation. No rales, crackles, rhonchi, or wheezing auscultated.   Cardiovascular:  Jugular Venous Pressure: Normal  Heart Rate and Rhythm: Normal, Heart Sounds: Normal S1 and S2. No S3 or S4 noted.  Murmurs: RUSB grade 1/6 systolic murmur present.  Lower Extremities: No edema noted.  Gastrointestinal:  Abdomen soft, non-distended, non-tender.    Musculoskeletal: Normal movement of extremities.  Skin and Nails: General appearance normal. No pallor, cyanosis, diaphoresis. Skin temperature normal. No clubbing of fingernails.   Psychiatric: Patient alert and oriented to person, place, and time. Speech and behavior appropriate. Normal mood and affect.       ECG 12 Lead    Date/Time: 4/28/2022 1:14 PM  Performed by: Lia Sutton APRN  Authorized by: Lia Sutton APRN   Comparison: compared with previous ECG from 3/28/2022  Similar to previous ECG  Rhythm: sinus rhythm  Rate: normal  BPM: 81  Conduction: 1st degree AV block  ST Segments: ST segments normal  T Waves: T waves normal  QRS axis: normal  Other: no other findings    Clinical impression: non-specific ECG              Assessment:       Diagnosis Plan   1. Nonrheumatic aortic valve stenosis     2. Dyspnea on exertion     3. First degree AV block     4. Bilateral carotid artery stenosis            Plan:       1.  Aortic Stenosis: Moderate to severe per echo June 2021; mild to moderate per echo March 2022.  I explained that the gradients have improved and I do not feel like her dyspnea with exertion is coming from the aortic stenosis.  They were inquiring about a TAVR procedure and at this time she does not qualify.    2.  Dyspnea on Exertion: This has been chronic since her RSV and pneumonia in 2018.    3.  First-degree A-V Block:  Noted on today's EKG.    4.  Bilateral Carotid Artery Stenosis: Stable per recent carotid duplex.    5.  Her first blood pressure was elevated and second blood pressure normal.  Continue to monitor.    6.  I recommended a 3-month follow-up visit with Dr. Braulio Sweet.      As always, it has been a pleasure to participate in your patient's care. Thank you.       Sincerely,         SONDRA Gilbert  [facility] Cardiology      · Dictated utilizing Dragon Dictation  · COVID-19 Precautions - Patient was compliant in wearing a mask. When I saw the patient, I used appropriate personal protective equipment (PPE) including mask and eye shield (standard procedure).  Additionally, I used gown and gloves if indicated.  Hand hygiene was completed before and after seeing the patient.  · I spent 30 minutes reviewing her medical records/testing/previous office notes/labs, face-to-face interaction with patient, physical examination, formulating the plan of care, and discussion of plan of care with patient.

## 2022-05-04 ENCOUNTER — READMISSION MANAGEMENT (OUTPATIENT)
Dept: CALL CENTER | Facility: HOSPITAL | Age: 87
End: 2022-05-04

## 2022-05-04 NOTE — OUTREACH NOTE
General Surgery Week 4 Survey    Flowsheet Row Responses   [facility] patient discharged from? [place]   Does the patient have one of the following disease processes/diagnoses(primary or secondary)? General Surgery   Week 4 attempt successful? Yes   Call start time 0842   Call end time 0843   Discharge diagnosis Esophageal dysphagia s/p Zenker's Diverticulotomy with Myotomy and esophagoscopy    Is the patient taking all medications as directed (includes completed medication regime)? Yes   Has the patient kept scheduled appointments due by today? Yes   Is the patient still receiving Home Health Services? N/A   What is the patient's perception of their health status since discharge? Improving   Week 4 call completed? Yes   Would the patient like one additional call? No   Graduated Yes   Was the number of calls appropriate? Yes          MARLINE H - Registered Nurse

## 2022-05-20 ENCOUNTER — TRANSCRIBE ORDERS (OUTPATIENT)
Dept: ADMINISTRATIVE | Facility: HOSPITAL | Age: 87
End: 2022-05-20

## 2022-05-20 DIAGNOSIS — M81.0 SENILE OSTEOPOROSIS: Primary | ICD-10-CM

## 2022-05-20 DIAGNOSIS — R06.00 DYSPNEA, UNSPECIFIED TYPE: ICD-10-CM

## 2022-05-24 ENCOUNTER — TRANSCRIBE ORDERS (OUTPATIENT)
Dept: ADMINISTRATIVE | Facility: HOSPITAL | Age: 87
End: 2022-05-24

## 2022-05-24 DIAGNOSIS — Z01.818 OTHER SPECIFIED PRE-OPERATIVE EXAMINATION: Primary | ICD-10-CM

## 2022-05-31 ENCOUNTER — LAB (OUTPATIENT)
Dept: LAB | Facility: HOSPITAL | Age: 87
End: 2022-05-31

## 2022-05-31 DIAGNOSIS — Z01.818 OTHER SPECIFIED PRE-OPERATIVE EXAMINATION: ICD-10-CM

## 2022-05-31 LAB — SARS-COV-2 ORF1AB RESP QL NAA+PROBE: NOT DETECTED

## 2022-05-31 PROCEDURE — C9803 HOPD COVID-19 SPEC COLLECT: HCPCS

## 2022-05-31 PROCEDURE — U0005 INFEC AGEN DETEC AMPLI PROBE: HCPCS

## 2022-05-31 PROCEDURE — U0004 COV-19 TEST NON-CDC HGH THRU: HCPCS

## 2022-06-01 ENCOUNTER — APPOINTMENT (OUTPATIENT)
Dept: INFUSION THERAPY | Facility: HOSPITAL | Age: 87
End: 2022-06-01

## 2022-06-01 ENCOUNTER — HOSPITAL ENCOUNTER (OUTPATIENT)
Dept: RESPIRATORY THERAPY | Facility: HOSPITAL | Age: 87
Discharge: HOME | End: 2022-06-01
Admitting: INTERNAL MEDICINE

## 2022-06-01 DIAGNOSIS — R06.00 DYSPNEA, UNSPECIFIED TYPE: ICD-10-CM

## 2022-06-01 PROCEDURE — 94010 BREATHING CAPACITY TEST: CPT

## 2022-06-01 PROCEDURE — 94726 PLETHYSMOGRAPHY LUNG VOLUMES: CPT

## 2022-06-01 PROCEDURE — 94729 DIFFUSING CAPACITY: CPT

## 2022-06-15 ENCOUNTER — CONSULT (OUTPATIENT)
Dept: ONCOLOGY | Facility: CLINIC | Age: 87
End: 2022-06-15

## 2022-06-15 ENCOUNTER — LAB (OUTPATIENT)
Dept: LAB | Facility: HOSPITAL | Age: 87
End: 2022-06-15

## 2022-06-15 VITALS
WEIGHT: 133.4 LBS | HEART RATE: 77 BPM | RESPIRATION RATE: 18 BRPM | DIASTOLIC BLOOD PRESSURE: 73 MMHG | BODY MASS INDEX: 26.19 KG/M2 | HEIGHT: 60 IN | SYSTOLIC BLOOD PRESSURE: 151 MMHG | TEMPERATURE: 96.9 F | OXYGEN SATURATION: 98 %

## 2022-06-15 DIAGNOSIS — I35.0 NONRHEUMATIC AORTIC VALVE STENOSIS: Chronic | ICD-10-CM

## 2022-06-15 DIAGNOSIS — D64.9 ANEMIA, UNSPECIFIED TYPE: Primary | ICD-10-CM

## 2022-06-15 LAB
ALBUMIN SERPL-MCNC: 3.8 G/DL (ref 3.5–5.2)
ALBUMIN/GLOB SERPL: 1 G/DL (ref 1.1–2.4)
ALP SERPL-CCNC: 131 U/L (ref 38–116)
ALT SERPL W P-5'-P-CCNC: 15 U/L (ref 0–33)
ANION GAP SERPL CALCULATED.3IONS-SCNC: 12.5 MMOL/L (ref 5–15)
AST SERPL-CCNC: 20 U/L (ref 0–32)
BASOPHILS # BLD AUTO: 0.08 10*3/MM3 (ref 0–0.2)
BASOPHILS NFR BLD AUTO: 0.8 % (ref 0–1.5)
BILIRUB SERPL-MCNC: 0.2 MG/DL (ref 0.2–1.2)
BUN SERPL-MCNC: 26 MG/DL (ref 6–20)
BUN/CREAT SERPL: 20.3 (ref 7.3–30)
CALCIUM SPEC-SCNC: 9.3 MG/DL (ref 8.5–10.2)
CHLORIDE SERPL-SCNC: 97 MMOL/L (ref 98–107)
CO2 SERPL-SCNC: 26.5 MMOL/L (ref 22–29)
CREAT SERPL-MCNC: 1.28 MG/DL (ref 0.6–1.1)
DEPRECATED RDW RBC AUTO: 45.8 FL (ref 37–54)
EGFRCR SERPLBLD CKD-EPI 2021: 38.9 ML/MIN/1.73
EOSINOPHIL # BLD AUTO: 0.68 10*3/MM3 (ref 0–0.4)
EOSINOPHIL NFR BLD AUTO: 7.1 % (ref 0.3–6.2)
ERYTHROCYTE [DISTWIDTH] IN BLOOD BY AUTOMATED COUNT: 14 % (ref 12.3–15.4)
GLOBULIN UR ELPH-MCNC: 3.8 GM/DL (ref 1.8–3.5)
GLUCOSE SERPL-MCNC: 111 MG/DL (ref 74–124)
HAPTOGLOB SERPL-MCNC: 290 MG/DL (ref 30–200)
HCT VFR BLD AUTO: 30.8 % (ref 34–46.6)
HGB BLD-MCNC: 10.2 G/DL (ref 12–15.9)
HGB RETIC QN AUTO: 31.4 PG (ref 29.8–36.1)
IMM GRANULOCYTES # BLD AUTO: 0.08 10*3/MM3 (ref 0–0.05)
IMM GRANULOCYTES NFR BLD AUTO: 0.8 % (ref 0–0.5)
IMM RETICS NFR: 9.3 % (ref 3–15.8)
LDH SERPL-CCNC: 178 U/L (ref 99–259)
LYMPHOCYTES # BLD AUTO: 1.85 10*3/MM3 (ref 0.7–3.1)
LYMPHOCYTES NFR BLD AUTO: 19.4 % (ref 19.6–45.3)
MCH RBC QN AUTO: 29.5 PG (ref 26.6–33)
MCHC RBC AUTO-ENTMCNC: 33.1 G/DL (ref 31.5–35.7)
MCV RBC AUTO: 89 FL (ref 79–97)
MONOCYTES # BLD AUTO: 0.65 10*3/MM3 (ref 0.1–0.9)
MONOCYTES NFR BLD AUTO: 6.8 % (ref 5–12)
NEUTROPHILS NFR BLD AUTO: 6.2 10*3/MM3 (ref 1.7–7)
NEUTROPHILS NFR BLD AUTO: 65.1 % (ref 42.7–76)
NRBC BLD AUTO-RTO: 0 /100 WBC (ref 0–0.2)
PLATELET # BLD AUTO: 246 10*3/MM3 (ref 140–450)
PMV BLD AUTO: 10.3 FL (ref 6–12)
POTASSIUM SERPL-SCNC: 4.8 MMOL/L (ref 3.5–4.7)
PROT SERPL-MCNC: 7.6 G/DL (ref 6.3–8)
RBC # BLD AUTO: 3.46 10*6/MM3 (ref 3.77–5.28)
RETICS # AUTO: 0.05 10*6/MM3 (ref 0.02–0.13)
RETICS/RBC NFR AUTO: 1.52 % (ref 0.7–1.9)
SODIUM SERPL-SCNC: 136 MMOL/L (ref 134–145)
URATE SERPL-MCNC: 5.9 MG/DL (ref 2.8–7.4)
WBC NRBC COR # BLD: 9.54 10*3/MM3 (ref 3.4–10.8)

## 2022-06-15 PROCEDURE — 99204 OFFICE O/P NEW MOD 45 MIN: CPT | Performed by: INTERNAL MEDICINE

## 2022-06-15 PROCEDURE — 84550 ASSAY OF BLOOD/URIC ACID: CPT | Performed by: INTERNAL MEDICINE

## 2022-06-15 PROCEDURE — 85046 RETICYTE/HGB CONCENTRATE: CPT | Performed by: INTERNAL MEDICINE

## 2022-06-15 PROCEDURE — 85025 COMPLETE CBC W/AUTO DIFF WBC: CPT

## 2022-06-15 PROCEDURE — 83615 LACTATE (LD) (LDH) ENZYME: CPT | Performed by: INTERNAL MEDICINE

## 2022-06-15 PROCEDURE — 80053 COMPREHEN METABOLIC PANEL: CPT | Performed by: INTERNAL MEDICINE

## 2022-06-15 PROCEDURE — 36415 COLL VENOUS BLD VENIPUNCTURE: CPT

## 2022-06-15 PROCEDURE — 83010 ASSAY OF HAPTOGLOBIN QUANT: CPT | Performed by: INTERNAL MEDICINE

## 2022-06-15 RX ORDER — PANTOPRAZOLE SODIUM 40 MG/1
40 TABLET, DELAYED RELEASE ORAL DAILY
COMMUNITY
Start: 2022-05-29

## 2022-06-15 NOTE — PROGRESS NOTES
Subjective     REASON FOR CONSULTATION: Chronic anemia  Provide an opinion on any further workup or treatment                             REQUESTING PHYSICIAN: Fanta Shankar MD    RECORDS OBTAINED:  Records of the patients history including those obtained from the referring provider were reviewed and summarized in detail.    HISTORY OF PRESENT ILLNESS:  The patient is a 89 y.o. year old female who is here for an opinion about the above issue.  She is referred to us from her primary care office for evaluation of chronic anemia.  She had recent labs which do not show any evidence of deficiency of B12, folate, or iron.  She has not seen any obvious bleeding.    She is remarkably active for her age and was previously a  at [place] into her 80s.    She does have a history of valvular heart disease and certainly could have a component of valve hemolysis.  She also had a slightly elevated creatinine on her most recent chemistries and she could have a component of anemia of chronic kidney disease with low erythropoietin.  Certainly myelodysplasia would also be in the differential but at age 89 with reasonably good counts I do not think we will put her through a bone marrow exam.    History of Present Illness     Past Medical History:   Diagnosis Date   • Acute pain of left shoulder due to trauma    • Anemia 1950   • Aortic valve regurgitation     trace   • Aortic valve stenosis     mild   • Rojas esophagus 2018   • Bilateral carotid artery stenosis    • Cancer (HCC) 1990    some skin cancers   • SPENCER (dyspnea on exertion)    • Esophageal varices (HCC) 2018   • Fall     going up the steps   • GERD (gastroesophageal reflux disease) 2018   • Heart murmur    • Mitral valve regurgitation     mild to moderate   • Pulmonary valve regurgitation     trace   • Tricuspid valve regurgitation     mild to moderate        Past Surgical History:   Procedure Laterality Date   • CATARACT EXTRACTION Bilateral    • HYSTERECTOMY      • TONSILLECTOMY AND ADENOIDECTOMY     • ZENKERS DIVERTICULECTOMY N/A 3/31/2022    Procedure: ZENKERS DIVERTICULECTOMY WITH MYOTOMY, ESOPHAGOSCOPY;  Surgeon: Chilango Louis III, MD;  Location: [facility] OR;  Service: Thoracic;  Laterality: N/A;        Current Outpatient Medications on File Prior to Visit   Medication Sig Dispense Refill   • aspirin 81 MG tablet Take 81 mg by mouth Daily.     • Calcium Carbonate 1500 (600 Ca) MG tablet Take 600 mg by mouth Daily.     • IRON PO Take 65 mg by mouth Daily.     • Misc Natural Products (OSTEO BI-FLEX ADV TRIPLE ST PO) Take 1 tablet by mouth Daily.     • MULTIPLE VITAMIN PO Take 1 tablet by mouth Daily.     • pantoprazole (PROTONIX) 40 MG EC tablet Take 40 mg by mouth Daily.     • vitamin B-12 (CYANOCOBALAMIN) 1000 MCG tablet Take 1,000 mcg by mouth Daily.     • Vitamin D, Cholecalciferol, 1000 units capsule Take 1 capsule by mouth Daily.     • Zinc 50 MG capsule      • [DISCONTINUED] omeprazole (priLOSEC) 40 MG capsule Take 1 capsule by mouth 2 (Two) Times a Day. 60 capsule 0     No current facility-administered medications on file prior to visit.        ALLERGIES:    Allergies   Allergen Reactions   • Molds & Smuts Shortness Of Breath   • Penicillins Hives   • Tessalon [Benzonatate] Delirium   • Oyster Shell Diarrhea        Social History     Socioeconomic History   • Marital status:    Tobacco Use   • Smoking status: Never Smoker   • Smokeless tobacco: Never Used   • Tobacco comment: caffine use   Vaping Use   • Vaping Use: Never used   Substance and Sexual Activity   • Alcohol use: Yes     Alcohol/week: 5.0 standard drinks     Types: 5 Glasses of wine per week     Comment: daily  4 ounce wine   • Drug use: No   • Sexual activity: Defer        Family History   Problem Relation Age of Onset   • Cancer Mother         bladder   • Heart attack Father    • Heart disease Father    • Sudden death Father    • Leukemia Father    • Heart attack Brother    • Heart  "disease Brother    • Sudden death Brother    • Cancer Son         Review of Systems   Constitutional: Positive for appetite change and fatigue. Negative for activity change, chills and fever.   HENT: Negative for mouth sores and voice change.    Eyes: Negative for pain and visual disturbance.   Respiratory: Negative for cough, shortness of breath and wheezing.    Cardiovascular: Negative for chest pain and palpitations.   Gastrointestinal: Negative for abdominal pain, constipation, diarrhea, nausea and vomiting.   Genitourinary: Negative for difficulty urinating, frequency and urgency.   Musculoskeletal: Positive for arthralgias. Negative for joint swelling.   Skin: Negative for rash.   Neurological: Negative for dizziness, seizures, weakness and headaches.   Hematological: Negative for adenopathy. Does not bruise/bleed easily.   Psychiatric/Behavioral: Negative for behavioral problems and confusion. The patient is not nervous/anxious.         Objective     Vitals:    06/15/22 1030   BP: 151/73   Pulse: 77   Resp: 18   Temp: 96.9 °F (36.1 °C)   TempSrc: Temporal   SpO2: 98%   Weight: 60.5 kg (133 lb 6.4 oz)   Height: 152.4 cm (60\")   PainSc: 0-No pain     Current Status 6/15/2022   ECOG score 0       Physical Exam  Constitutional:       General: She is not in acute distress.     Appearance: She is well-developed.   HENT:      Head: Normocephalic.   Eyes:      General: No scleral icterus.     Conjunctiva/sclera: Conjunctivae normal.      Pupils: Pupils are equal, round, and reactive to light.   Neck:      Thyroid: No thyromegaly.      Vascular: No JVD.   Cardiovascular:      Rate and Rhythm: Normal rate and regular rhythm.      Heart sounds: Murmur heard.     No friction rub. No gallop.   Pulmonary:      Effort: Pulmonary effort is normal.      Breath sounds: Normal breath sounds. No wheezing or rales.   Abdominal:      General: There is no distension.      Palpations: Abdomen is soft. There is no mass.      " Tenderness: There is no abdominal tenderness.   Musculoskeletal:         General: No deformity. Normal range of motion.      Cervical back: Normal range of motion and neck supple.   Lymphadenopathy:      Cervical: No cervical adenopathy.   Skin:     General: Skin is warm and dry.      Findings: No erythema or rash.   Neurological:      Mental Status: She is alert and oriented to person, place, and time.      Cranial Nerves: No cranial nerve deficit.      Deep Tendon Reflexes: Reflexes are normal and symmetric.   Psychiatric:         Behavior: Behavior normal.         Judgment: Judgment normal.           RECENT LABS:  Hematology WBC   Date Value Ref Range Status   06/15/2022 9.54 3.40 - 10.80 10*3/mm3 Final     RBC   Date Value Ref Range Status   06/15/2022 3.46 (L) 3.77 - 5.28 10*6/mm3 Final     Hemoglobin   Date Value Ref Range Status   06/15/2022 10.2 (L) 12.0 - 15.9 g/dL Final     Hematocrit   Date Value Ref Range Status   06/15/2022 30.8 (L) 34.0 - 46.6 % Final     Platelets   Date Value Ref Range Status   06/15/2022 246 140 - 450 10*3/mm3 Final        Lab Results   Component Value Date    IRON 37 04/03/2022    TIBC 258 (L) 04/03/2022    FERRITIN 495.00 (H) 04/03/2022   SAT 14%    Lab Results   Component Value Date    [id number] 1,442 (H) 04/03/2022     Lab Results   Component Value Date    FOLATE 16.60 04/03/2022     Lab Results   Component Value Date    GLUCOSE 122 (H) 04/04/2022    CALCIUM 8.5 04/04/2022     (L) 04/04/2022    K 4.7 04/04/2022    CO2 29.0 04/04/2022    CL 97 (L) 04/04/2022    BUN 24 (H) 04/04/2022    CREATININE 0.97 04/04/2022    EGFRIFNONA 35 (L) 10/01/2021    BCR 24.7 04/04/2022    ANIONGAP 8.0 04/04/2022     Review of the peripheral smear shows normochromic normocytic red cells with mild rouleaux formation.  No schistocytes noted.  There are no micro spherocytes and no increased polychromatophilia noted.  White cells and platelets appear normal.      Assessment & Plan   1.  Mild  chronic anemia which is most likely multifactorial.  Recent labs have not demonstrated any definite deficiency state and patient is on supplemental B12 and iron currently.  We certainly do not see any signs of a serious hematologic disorder based on review of her peripheral smear and her anemia is relatively mild and well-tolerated.  2.  Recent surgery for Zenker's diverticulum performed by Dr. Louis 3/31/2020  3.  Valvular heart disease with aortic stenosis.  Her most recent echocardiogram from 3/29/2022 showed the stenosis is mild to moderate.  Her left ventricular ejection fraction is well-maintained at 66 to 70%.    Recommendations  1.  I reassured the patient and her daughter that we do not see any signs of a serious hematologic disorder.  I suspect her mild chronic anemia is multifactorial.  2.  She will return to our lab for some additional studies today including a reticulocyte count, LDH and haptoglobin, serum erythropoietin and serum protein electrophoresis with immunofixation.  3.  I have asked the patient to return for 1 more follow-up visit in about 4 to 6 weeks.  In the meantime as the lab results filtrating and if there are any significant abnormalities we can contact patient by phone to discuss them.  4.  For now she will continue her oral iron supplement and B complex supplement.    Thanks Fanta for referring this kae patient to us.

## 2022-06-16 LAB
ALBUMIN SERPL ELPH-MCNC: 3.1 G/DL (ref 2.9–4.4)
ALBUMIN/GLOB SERPL: 0.8 {RATIO} (ref 0.7–1.7)
ALPHA1 GLOB SERPL ELPH-MCNC: 0.3 G/DL (ref 0–0.4)
ALPHA2 GLOB SERPL ELPH-MCNC: 0.9 G/DL (ref 0.4–1)
B-GLOBULIN SERPL ELPH-MCNC: 1.2 G/DL (ref 0.7–1.3)
EPO SERPL-ACNC: 10.1 MIU/ML (ref 2.6–18.5)
GAMMA GLOB SERPL ELPH-MCNC: 1.5 G/DL (ref 0.4–1.8)
GLOBULIN SER-MCNC: 4 G/DL (ref 2.2–3.9)
IGA SERPL-MCNC: 418 MG/DL (ref 64–422)
IGG SERPL-MCNC: 1453 MG/DL (ref 586–1602)
IGM SERPL-MCNC: 132 MG/DL (ref 26–217)
INTERPRETATION SERPL IEP-IMP: ABNORMAL
KAPPA LC FREE SER-MCNC: 72.5 MG/L (ref 3.3–19.4)
KAPPA LC FREE/LAMBDA FREE SER: 1.77 {RATIO} (ref 0.26–1.65)
LABORATORY COMMENT REPORT: ABNORMAL
LAMBDA LC FREE SERPL-MCNC: 41 MG/L (ref 5.7–26.3)
M PROTEIN SERPL ELPH-MCNC: ABNORMAL G/DL
PROT SERPL-MCNC: 7.1 G/DL (ref 6–8.5)

## 2022-07-07 PROBLEM — M81.0 SENILE OSTEOPOROSIS: Status: ACTIVE | Noted: 2022-07-07

## 2022-07-07 RX ORDER — ZOLEDRONIC ACID 5 MG/100ML
5 INJECTION, SOLUTION INTRAVENOUS ONCE
Status: CANCELLED | OUTPATIENT
Start: 2022-07-12

## 2022-07-12 ENCOUNTER — HOSPITAL ENCOUNTER (OUTPATIENT)
Dept: INFUSION THERAPY | Facility: HOSPITAL | Age: 87
Setting detail: INFUSION SERIES
Discharge: HOME | End: 2022-07-12

## 2022-07-12 VITALS
HEART RATE: 96 BPM | BODY MASS INDEX: 25.39 KG/M2 | RESPIRATION RATE: 18 BRPM | OXYGEN SATURATION: 100 % | WEIGHT: 130 LBS | TEMPERATURE: 97.8 F | DIASTOLIC BLOOD PRESSURE: 67 MMHG | SYSTOLIC BLOOD PRESSURE: 159 MMHG

## 2022-07-12 DIAGNOSIS — M81.0 SENILE OSTEOPOROSIS: Primary | ICD-10-CM

## 2022-07-12 RX ORDER — ZOLEDRONIC ACID 5 MG/100ML
5 INJECTION, SOLUTION INTRAVENOUS ONCE
Status: DISCONTINUED | OUTPATIENT
Start: 2022-07-12 | End: 2022-07-14 | Stop reason: HOSPADM

## 2022-07-12 RX ORDER — ZOLEDRONIC ACID 5 MG/100ML
5 INJECTION, SOLUTION INTRAVENOUS ONCE
Status: CANCELLED | OUTPATIENT
Start: 2022-07-12

## 2022-07-12 NOTE — PROGRESS NOTES
Creatinine Clearance less than 35 therefore patient cannot receive Reclast. Patient, patient's daughter, and Latonya with Dr. Shankar's office notified. Latonya also notified patient would be a candidate for Prolia but they would have to schedule patient at [facility]. Patient discharged ambulatory with daughter.

## 2022-07-14 ENCOUNTER — APPOINTMENT (OUTPATIENT)
Dept: INFUSION THERAPY | Facility: HOSPITAL | Age: 87
End: 2022-07-14

## 2022-07-18 ENCOUNTER — LAB (OUTPATIENT)
Dept: LAB | Facility: HOSPITAL | Age: 87
End: 2022-07-18

## 2022-07-18 ENCOUNTER — OFFICE VISIT (OUTPATIENT)
Dept: ONCOLOGY | Facility: CLINIC | Age: 87
End: 2022-07-18

## 2022-07-18 VITALS
BODY MASS INDEX: 27.03 KG/M2 | WEIGHT: 137.7 LBS | HEART RATE: 89 BPM | OXYGEN SATURATION: 94 % | SYSTOLIC BLOOD PRESSURE: 120 MMHG | TEMPERATURE: 97.3 F | DIASTOLIC BLOOD PRESSURE: 83 MMHG | RESPIRATION RATE: 16 BRPM | HEIGHT: 60 IN

## 2022-07-18 DIAGNOSIS — D64.9 ANEMIA, UNSPECIFIED TYPE: ICD-10-CM

## 2022-07-18 DIAGNOSIS — D64.9 ANEMIA, UNSPECIFIED TYPE: Primary | ICD-10-CM

## 2022-07-18 LAB
BASOPHILS # BLD AUTO: 0.08 10*3/MM3 (ref 0–0.2)
BASOPHILS NFR BLD AUTO: 0.6 % (ref 0–1.5)
DEPRECATED RDW RBC AUTO: 44.4 FL (ref 37–54)
EOSINOPHIL # BLD AUTO: 0.59 10*3/MM3 (ref 0–0.4)
EOSINOPHIL NFR BLD AUTO: 4.3 % (ref 0.3–6.2)
ERYTHROCYTE [DISTWIDTH] IN BLOOD BY AUTOMATED COUNT: 13.7 % (ref 12.3–15.4)
HCT VFR BLD AUTO: 30.2 % (ref 34–46.6)
HGB BLD-MCNC: 9.9 G/DL (ref 12–15.9)
IMM GRANULOCYTES # BLD AUTO: 0.07 10*3/MM3 (ref 0–0.05)
IMM GRANULOCYTES NFR BLD AUTO: 0.5 % (ref 0–0.5)
LYMPHOCYTES # BLD AUTO: 2.74 10*3/MM3 (ref 0.7–3.1)
LYMPHOCYTES NFR BLD AUTO: 20 % (ref 19.6–45.3)
MCH RBC QN AUTO: 28.9 PG (ref 26.6–33)
MCHC RBC AUTO-ENTMCNC: 32.8 G/DL (ref 31.5–35.7)
MCV RBC AUTO: 88 FL (ref 79–97)
MONOCYTES # BLD AUTO: 1.23 10*3/MM3 (ref 0.1–0.9)
MONOCYTES NFR BLD AUTO: 9 % (ref 5–12)
NEUTROPHILS NFR BLD AUTO: 65.6 % (ref 42.7–76)
NEUTROPHILS NFR BLD AUTO: 8.98 10*3/MM3 (ref 1.7–7)
NRBC BLD AUTO-RTO: 0 /100 WBC (ref 0–0.2)
PLATELET # BLD AUTO: 256 10*3/MM3 (ref 140–450)
PMV BLD AUTO: 9.9 FL (ref 6–12)
RBC # BLD AUTO: 3.43 10*6/MM3 (ref 3.77–5.28)
WBC NRBC COR # BLD: 13.69 10*3/MM3 (ref 3.4–10.8)

## 2022-07-18 PROCEDURE — 85025 COMPLETE CBC W/AUTO DIFF WBC: CPT

## 2022-07-18 PROCEDURE — 36415 COLL VENOUS BLD VENIPUNCTURE: CPT

## 2022-07-18 PROCEDURE — 99213 OFFICE O/P EST LOW 20 MIN: CPT | Performed by: INTERNAL MEDICINE

## 2022-07-18 NOTE — PROGRESS NOTES
Subjective     REASON FOR FOLLOW UP: Chronic anemia    HISTORY OF PRESENT ILLNESS:  The patient is a 89 y.o. year old female who was referred to us from her primary care office for evaluation of chronic anemia.  She had recent labs which do not show any evidence of deficiency of B12, folate, or iron.  She has not seen any obvious bleeding.    She is remarkably active for her age and was previously a  at [employer] into her 80s.    She does have a history of valvular heart disease and certainly could have a component of valve hemolysis.  She also had a slightly elevated creatinine on her most recent chemistries and she could have a component of anemia of chronic kidney disease with low erythropoietin.  Certainly myelodysplasia would also be in the differential but at age 89 with reasonably good counts I do not think we will put her through a bone marrow exam.    With initial consult visit of 6/15/2022 we james a battery of labs and she returns today to review the results.  There was no laboratory evidence of hemolysis.  She did have an inappropriately low erythropoietin level.  Her hemoglobin in the office today is essentially stable at 9.9 g/dL.    History of Present Illness     Past Medical History:   Diagnosis Date   • Acute pain of left shoulder due to trauma    • Anemia 1950   • Aortic valve regurgitation     trace   • Aortic valve stenosis     mild   • Rojas esophagus 2018   • Bilateral carotid artery stenosis    • Cancer (HCC) 1990    some skin cancers   • SPENCER (dyspnea on exertion)    • Esophageal varices (HCC) 2018   • Fall     going up the steps   • GERD (gastroesophageal reflux disease) 2018   • Heart murmur    • Mitral valve regurgitation     mild to moderate   • Pulmonary valve regurgitation     trace   • Tricuspid valve regurgitation     mild to moderate        Past Surgical History:   Procedure Laterality Date   • CATARACT EXTRACTION Bilateral    • HYSTERECTOMY     • TONSILLECTOMY AND  ADENOIDECTOMY     • ZENKERS DIVERTICULECTOMY N/A 3/31/2022    Procedure: ZENKERS DIVERTICULECTOMY WITH MYOTOMY, ESOPHAGOSCOPY;  Surgeon: Chilango Louis III, MD;  Location: [facility] OR;  Service: Thoracic;  Laterality: N/A;        Current Outpatient Medications on File Prior to Visit   Medication Sig Dispense Refill   • aspirin 81 MG tablet Take 81 mg by mouth Daily.     • Calcium Carbonate 1500 (600 Ca) MG tablet Take 600 mg by mouth Daily.     • IRON PO Take 65 mg by mouth Daily.     • Misc Natural Products (OSTEO BI-FLEX ADV TRIPLE ST PO) Take 1 tablet by mouth Daily.     • MULTIPLE VITAMIN PO Take 1 tablet by mouth Daily.     • pantoprazole (PROTONIX) 40 MG EC tablet Take 40 mg by mouth Daily.     • vitamin B-12 (CYANOCOBALAMIN) 1000 MCG tablet Take 1,000 mcg by mouth Daily.     • Vitamin D, Cholecalciferol, 1000 units capsule Take 1 capsule by mouth Daily.     • Zinc 50 MG capsule        No current facility-administered medications on file prior to visit.        ALLERGIES:    Allergies   Allergen Reactions   • Molds & Smuts Shortness Of Breath   • Penicillins Hives   • Tessalon [Benzonatate] Delirium   • Oyster Shell Diarrhea        Social History     Socioeconomic History   • Marital status:    Tobacco Use   • Smoking status: Never Smoker   • Smokeless tobacco: Never Used   • Tobacco comment: caffine use   Vaping Use   • Vaping Use: Never used   Substance and Sexual Activity   • Alcohol use: Yes     Alcohol/week: 5.0 standard drinks     Types: 5 Glasses of wine per week     Comment: daily  4 ounce wine   • Drug use: No   • Sexual activity: Defer        Family History   Problem Relation Age of Onset   • Cancer Mother         bladder   • Heart attack Father    • Heart disease Father    • Sudden death Father    • Leukemia Father    • Heart attack Brother    • Heart disease Brother    • Sudden death Brother    • Cancer Son         Review of Systems   Constitutional: Positive for appetite change and  "fatigue. Negative for activity change, chills and fever.   HENT: Negative for mouth sores and voice change.    Eyes: Negative for pain and visual disturbance.   Respiratory: Negative for cough, shortness of breath and wheezing.    Cardiovascular: Negative for chest pain and palpitations.   Gastrointestinal: Negative for abdominal pain, constipation, diarrhea, nausea and vomiting.   Genitourinary: Negative for difficulty urinating, frequency and urgency.   Musculoskeletal: Positive for arthralgias. Negative for joint swelling.   Skin: Negative for rash.   Neurological: Negative for dizziness, seizures, weakness and headaches.   Hematological: Negative for adenopathy. Does not bruise/bleed easily.   Psychiatric/Behavioral: Negative for behavioral problems and confusion. The patient is not nervous/anxious.         Objective     Vitals:    07/18/22 1352   BP: 120/83   Pulse: 89   Resp: 16   Temp: 97.3 °F (36.3 °C)   TempSrc: Temporal   SpO2: 94%   Weight: 62.5 kg (137 lb 11.2 oz)   Height: 152.4 cm (60\")   PainSc: 0-No pain     Current Status 7/18/2022   ECOG score 1       Physical Exam  Constitutional:       General: She is not in acute distress.     Appearance: She is well-developed.   HENT:      Head: Normocephalic.   Eyes:      General: No scleral icterus.     Conjunctiva/sclera: Conjunctivae normal.      Pupils: Pupils are equal, round, and reactive to light.   Neck:      Thyroid: No thyromegaly.      Vascular: No JVD.   Cardiovascular:      Rate and Rhythm: Normal rate and regular rhythm.      Heart sounds: Murmur heard.     No friction rub. No gallop.   Pulmonary:      Effort: Pulmonary effort is normal.      Breath sounds: Normal breath sounds. No wheezing or rales.   Abdominal:      General: There is no distension.      Palpations: Abdomen is soft. There is no mass.      Tenderness: There is no abdominal tenderness.   Musculoskeletal:         General: No deformity. Normal range of motion.      Cervical back: " Normal range of motion and neck supple.   Lymphadenopathy:      Cervical: No cervical adenopathy.   Skin:     General: Skin is warm and dry.      Findings: No erythema or rash.   Neurological:      Mental Status: She is alert and oriented to person, place, and time.      Cranial Nerves: No cranial nerve deficit.      Deep Tendon Reflexes: Reflexes are normal and symmetric.   Psychiatric:         Behavior: Behavior normal.         Judgment: Judgment normal.       I have reexamined the patient and the results are consistent with the previously documented exam. José Antonio Cm MD       RECENT LABS:  Hematology WBC   Date Value Ref Range Status   07/18/2022 13.69 (H) 3.40 - 10.80 10*3/mm3 Final     RBC   Date Value Ref Range Status   07/18/2022 3.43 (L) 3.77 - 5.28 10*6/mm3 Final     Hemoglobin   Date Value Ref Range Status   07/18/2022 9.9 (L) 12.0 - 15.9 g/dL Final     Hematocrit   Date Value Ref Range Status   07/18/2022 30.2 (L) 34.0 - 46.6 % Final     Platelets   Date Value Ref Range Status   07/18/2022 256 140 - 450 10*3/mm3 Final        Lab Results   Component Value Date    IRON 37 04/03/2022    TIBC 258 (L) 04/03/2022    FERRITIN 495.00 (H) 04/03/2022   SAT 14%    Lab Results   Component Value Date    [id number] 1,442 (H) 04/03/2022     Lab Results   Component Value Date    FOLATE 16.60 04/03/2022     Lab Results   Component Value Date    GLUCOSE 111 06/15/2022    CALCIUM 9.3 06/15/2022     06/15/2022    K 4.8 (H) 06/15/2022    CO2 26.5 06/15/2022    CL 97 (L) 06/15/2022    BUN 26 (H) 06/15/2022    CREATININE 1.28 (H) 06/15/2022    EGFRIFNONA 35 (L) 10/01/2021    BCR 20.3 06/15/2022    ANIONGAP 12.5 06/15/2022     Review of the peripheral smear shows normochromic normocytic red cells with mild rouleaux formation.  No schistocytes noted.  There are no micro spherocytes and no increased polychromatophilia noted.  White cells and platelets appear normal.      Assessment & Plan   1.  Mild chronic anemia which  is most likely multifactorial.  Recent labs have not demonstrated any definite deficiency state and patient is on supplemental B12 and iron currently.  We certainly do not see any signs of a serious hematologic disorder based on review of her peripheral smear and her anemia is relatively mild and well-tolerated.  2.  Recent surgery for Zenker's diverticulum performed by Dr. Louis 3/31/2020  3.  Valvular heart disease with aortic stenosis.  Her most recent echocardiogram from 3/29/2022 showed the stenosis is mild to moderate.  Her left ventricular ejection fraction is well-maintained at 66 to 70%.  There was no laboratory evidence of valve hemolysis.    Recommendations  1.  I reassured the patient and her daughter that we do not see any signs of a serious hematologic disorder.  I suspect her mild chronic anemia is multifactorial.  Unfortunately I do not see anything that we could significantly impact other than possibly giving her Procrit injections.  With her hemoglobin staying close to 10 I do not think the Procrit would be worth her while.  She tells me she is getting tired of coming to doctors offices and she would have to get a shot every couple of weeks most likely and we still would only be able to get her hemoglobin to slightly above 10 and then she would have to hold further injections until she drifted back down below 10.  2.  We have recommended that she stay on an oral iron supplement and B complex vitamin.  3.  We have not scheduled routine follow-up in our office but certainly would be happy to see this nice patient again anytime in the future.    Thanks Fanta for referring this kae patient to us.

## 2022-08-02 ENCOUNTER — OFFICE VISIT (OUTPATIENT)
Dept: CARDIOLOGY | Facility: CLINIC | Age: 87
End: 2022-08-02

## 2022-08-02 VITALS
SYSTOLIC BLOOD PRESSURE: 112 MMHG | WEIGHT: 136.6 LBS | HEART RATE: 100 BPM | DIASTOLIC BLOOD PRESSURE: 72 MMHG | BODY MASS INDEX: 26.82 KG/M2 | HEIGHT: 60 IN

## 2022-08-02 DIAGNOSIS — K22.5 ZENKER'S DIVERTICULUM: ICD-10-CM

## 2022-08-02 DIAGNOSIS — Z09 HOSPITAL DISCHARGE FOLLOW-UP: ICD-10-CM

## 2022-08-02 DIAGNOSIS — I35.0 NONRHEUMATIC AORTIC VALVE STENOSIS: Primary | Chronic | ICD-10-CM

## 2022-08-02 DIAGNOSIS — R06.09 DYSPNEA ON EXERTION: Chronic | ICD-10-CM

## 2022-08-02 PROCEDURE — 99214 OFFICE O/P EST MOD 30 MIN: CPT | Performed by: INTERNAL MEDICINE

## 2022-08-02 NOTE — PROGRESS NOTES
Subjective:     Encounter Date: 08/02/22        Patient ID: Shaina Mcknight is a 89 y.o. female.    Chief Complaint:  Shortness of Breath  Pertinent negatives include no ear pain, fever, hemoptysis, neck pain, rash, sore throat or sputum production.       Dear Dr. Shankar,    I had the pleasure seeing this patient in the office today for follow-up of her cardiac status.  She has a history of aortic stenosis.  She was recently hospitalized after having a Zenker's diverticulum and ended up having to have surgical intervention for this.    She comes in today for follow-up.  She continues to have baseline dyspnea which she has had for years.  She has a history of prior TB, prior RSV with pulmonary fibrosis, history of DVT.  She is on chronic oxygen therapy.    She denies any chest pain or chest discomfort.  No palpitations or tachycardia.    I saw her back in 2017.  She had a stress echocardiogram performed in 2016 that showed normal stress echocardiogram with no ischemia but mild aortic valve stenosis.  We followed up in 2017 and she was doing well but was still having dyspnea on exertion.  She has been following with pulmonary.  She does have a history of TB.  She then had a repeat echocardiogram performed 2018 that showed mild to moderate aortic stenosis, mild mitral stenosis, normal left lower function.  Normal pulmonary arterial pressure.    She was hospitalized with severe RSV pneumonia in November 2018.  At one point during that time she was not felt that she would survive.   She is on nocturnal oxygen.      The following portions of the patient's history were reviewed and updated as appropriate: allergies, current medications, past family history, past medical history, past social history, past surgical history and problem list.    Past Medical History:   Diagnosis Date   • Acute pain of left shoulder due to trauma    • Anemia 1950   • Aortic valve regurgitation     trace   • Aortic valve stenosis     mild  "  • Rojas esophagus 2018   • Bilateral carotid artery stenosis    • Cancer (HCC) 1990    some skin cancers   • SPENCER (dyspnea on exertion)    • Esophageal varices (HCC) 2018   • Fall     going up the steps   • GERD (gastroesophageal reflux disease) 2018   • Heart murmur    • Mitral valve regurgitation     mild to moderate   • Pulmonary valve regurgitation     trace   • Tricuspid valve regurgitation     mild to moderate       Past Surgical History:   Procedure Laterality Date   • CATARACT EXTRACTION Bilateral    • HYSTERECTOMY     • TONSILLECTOMY AND ADENOIDECTOMY     • ZENKERS DIVERTICULECTOMY N/A 3/31/2022    Procedure: ZENKERS DIVERTICULECTOMY WITH MYOTOMY, ESOPHAGOSCOPY;  Surgeon: Chilango Louis III, MD;  Location: [facility];  Service: Thoracic;  Laterality: N/A;       Social History     Socioeconomic History   • Marital status:    Tobacco Use   • Smoking status: Never Smoker   • Smokeless tobacco: Never Used   • Tobacco comment: caffine use   Vaping Use   • Vaping Use: Never used   Substance and Sexual Activity   • Alcohol use: Yes     Alcohol/week: 5.0 standard drinks     Types: 5 Glasses of wine per week     Comment: daily  4 ounce wine   • Drug use: No   • Sexual activity: Defer         Procedures       Objective:     Vitals:    08/02/22 1417   BP: 112/72   BP Location: Right arm   Pulse: 100   Weight: 62 kg (136 lb 9.6 oz)   Height: 152.4 cm (60\")         Physical Exam  Constitutional:       General: She is not in acute distress.     Appearance: She is well-developed. She is not diaphoretic.   HENT:      Head: Normocephalic and atraumatic.      Nose: Nose normal.   Eyes:      General:         Right eye: No discharge.         Left eye: No discharge.      Conjunctiva/sclera: Conjunctivae normal.      Pupils: Pupils are equal, round, and reactive to light.   Neck:      Thyroid: No thyromegaly.      Trachea: No tracheal deviation.   Cardiovascular:      Rate and Rhythm: Normal rate and regular " rhythm.      Pulses: Normal pulses.      Heart sounds: S1 normal and S2 normal. Murmur heard.    Harsh midsystolic murmur is present with a grade of 1/6 at the upper right sternal border radiating to the neck.    No S3 sounds.   Pulmonary:      Effort: Pulmonary effort is normal. No respiratory distress.      Breath sounds: Normal breath sounds. No stridor.   Chest:      Chest wall: No tenderness.   Abdominal:      General: Bowel sounds are normal. There is no distension.      Palpations: Abdomen is soft. There is no mass.      Tenderness: There is no abdominal tenderness. There is no guarding or rebound.   Musculoskeletal:         General: No tenderness or deformity. Normal range of motion.      Cervical back: Normal range of motion and neck supple.   Lymphadenopathy:      Cervical: No cervical adenopathy.   Skin:     General: Skin is warm and dry.      Findings: No erythema or rash.   Neurological:      Mental Status: She is alert and oriented to person, place, and time.      Deep Tendon Reflexes: Reflexes are normal and symmetric.   Psychiatric:         Thought Content: Thought content normal.         Lab Review:             Lab Results   Component Value Date    GLUCOSE 111 06/15/2022    BUN 26 (H) 06/15/2022    CREATININE 1.28 (H) 06/15/2022    EGFRIFNONA 35 (L) 10/01/2021    BCR 20.3 06/15/2022    K 4.8 (H) 06/15/2022    CO2 26.5 06/15/2022    CALCIUM 9.3 06/15/2022    PROTENTOTREF 7.1 06/15/2022    ALBUMIN 3.80 06/15/2022    ALBUMIN 3.1 06/15/2022    LABIL2 0.8 06/15/2022    AST 20 06/15/2022    ALT 15 06/15/2022       Results for orders placed during the hospital encounter of 03/25/22    Adult Transthoracic Echo Complete W/ Cont if Necessary Per Protocol    Interpretation Summary  · Left ventricular ejection fraction appears to be 66 - 70%. Left ventricular systolic function is normal. Normal left ventricular cavity size and wall thickness noted. All left ventricular wall segments contract normally. Left  ventricular diastolic function is consistent with (grade I) impaired relaxation. No evidence of left ventricular thrombus or mass present.  · There is mild calcification of the aortic valve. No significant aortic valve regurgitation is present. Mild to moderate aortic valve stenosis is present.            Assessment:          Diagnosis Plan   1. Nonrheumatic aortic valve stenosis     2. Dyspnea on exertion     3. Zenker's diverticulum     4. Hospital discharge follow-up             Plan:       1.  Valvular heart disease-echocardiogram March showed mild to moderate aortic stenosis.  This should not be contributing to her symptoms at this point, we will continue to follow  2.  Bronchospastic lung disease, history of RSV pneumonia, history of DVT, prior TB-followed by pulmonary  3.  Nocturnal oxygen therapy per pulmonary  4.  Zenker's diverticulum with esophageal obstruction status post Zenker's diverticuli to me March 31, 2022.    Thank you very much for allowing us to participate in the care of this pleasant patient.  Please don't hesitate to call if I can be of assistance in any way.      Current Outpatient Medications:   •  aspirin 81 MG tablet, Take 81 mg by mouth Daily., Disp: , Rfl:   •  Calcium Carbonate 1500 (600 Ca) MG tablet, Take 600 mg by mouth Daily., Disp: , Rfl:   •  IRON PO, Take 65 mg by mouth Daily., Disp: , Rfl:   •  Misc Natural Products (OSTEO BI-FLEX ADV TRIPLE ST PO), Take 1 tablet by mouth Daily., Disp: , Rfl:   •  MULTIPLE VITAMIN PO, Take 1 tablet by mouth Daily., Disp: , Rfl:   •  pantoprazole (PROTONIX) 40 MG EC tablet, Take 40 mg by mouth Daily., Disp: , Rfl:   •  vitamin B-12 (CYANOCOBALAMIN) 1000 MCG tablet, Take 1,000 mcg by mouth Daily., Disp: , Rfl:   •  Vitamin D, Cholecalciferol, 1000 units capsule, Take 1 capsule by mouth Daily., Disp: , Rfl:

## 2022-11-30 ENCOUNTER — HOSPITAL ENCOUNTER (OUTPATIENT)
Dept: BONE DENSITY | Facility: HOSPITAL | Age: 87
Discharge: HOME | End: 2022-11-30
Admitting: INTERNAL MEDICINE

## 2022-11-30 DIAGNOSIS — M81.0 SENILE OSTEOPOROSIS: ICD-10-CM

## 2022-11-30 PROCEDURE — 77080 DXA BONE DENSITY AXIAL: CPT

## 2023-01-21 VITALS
DIASTOLIC BLOOD PRESSURE: 99 MMHG | HEART RATE: 99 BPM | SYSTOLIC BLOOD PRESSURE: 195 MMHG | RESPIRATION RATE: 18 BRPM | OXYGEN SATURATION: 92 % | HEIGHT: 60 IN | BODY MASS INDEX: 25.52 KG/M2 | WEIGHT: 130 LBS | TEMPERATURE: 97.9 F

## 2023-01-21 PROCEDURE — 99282 EMERGENCY DEPT VISIT SF MDM: CPT

## 2023-01-22 ENCOUNTER — HOSPITAL ENCOUNTER (EMERGENCY)
Facility: HOSPITAL | Age: 88
Discharge: HOME | End: 2023-01-22
Attending: EMERGENCY MEDICINE | Admitting: EMERGENCY MEDICINE
Payer: MEDICARE

## 2023-01-22 ENCOUNTER — HOSPITAL ENCOUNTER (OUTPATIENT)
Dept: CARDIOLOGY | Facility: HOSPITAL | Age: 88
Discharge: HOME | End: 2023-01-22
Admitting: EMERGENCY MEDICINE
Payer: MEDICARE

## 2023-01-22 DIAGNOSIS — M79.605 PAIN OF LEFT LOWER EXTREMITY: Primary | ICD-10-CM

## 2023-01-22 DIAGNOSIS — M79.605 PAIN OF LEFT LOWER EXTREMITY: ICD-10-CM

## 2023-01-22 LAB
BH CV LOW VAS LEFT LESSER SAPH VESSEL: 1
BH CV LOWER VASCULAR LEFT COMMON FEMORAL AUGMENT: NORMAL
BH CV LOWER VASCULAR LEFT COMMON FEMORAL COMPETENT: NORMAL
BH CV LOWER VASCULAR LEFT COMMON FEMORAL COMPRESS: NORMAL
BH CV LOWER VASCULAR LEFT COMMON FEMORAL PHASIC: NORMAL
BH CV LOWER VASCULAR LEFT COMMON FEMORAL SPONT: NORMAL
BH CV LOWER VASCULAR LEFT DISTAL FEMORAL COMPRESS: NORMAL
BH CV LOWER VASCULAR LEFT GASTRONEMIUS COMPRESS: NORMAL
BH CV LOWER VASCULAR LEFT GREATER SAPH AK COMPRESS: NORMAL
BH CV LOWER VASCULAR LEFT GREATER SAPH BK COMPRESS: NORMAL
BH CV LOWER VASCULAR LEFT LESSER SAPH COMPRESS: NORMAL
BH CV LOWER VASCULAR LEFT LESSER SAPH THROMBUS: NORMAL
BH CV LOWER VASCULAR LEFT MID FEMORAL AUGMENT: NORMAL
BH CV LOWER VASCULAR LEFT MID FEMORAL COMPETENT: NORMAL
BH CV LOWER VASCULAR LEFT MID FEMORAL COMPRESS: NORMAL
BH CV LOWER VASCULAR LEFT MID FEMORAL PHASIC: NORMAL
BH CV LOWER VASCULAR LEFT MID FEMORAL SPONT: NORMAL
BH CV LOWER VASCULAR LEFT PERONEAL COMPRESS: NORMAL
BH CV LOWER VASCULAR LEFT POPLITEAL AUGMENT: NORMAL
BH CV LOWER VASCULAR LEFT POPLITEAL COMPETENT: NORMAL
BH CV LOWER VASCULAR LEFT POPLITEAL COMPRESS: NORMAL
BH CV LOWER VASCULAR LEFT POPLITEAL PHASIC: NORMAL
BH CV LOWER VASCULAR LEFT POPLITEAL SPONT: NORMAL
BH CV LOWER VASCULAR LEFT POSTERIOR TIBIAL COMPRESS: NORMAL
BH CV LOWER VASCULAR LEFT PROFUNDA FEMORAL COMPRESS: NORMAL
BH CV LOWER VASCULAR LEFT PROXIMAL FEMORAL COMPRESS: NORMAL
BH CV LOWER VASCULAR LEFT SAPHENOFEMORAL JUNCTION COMPRESS: NORMAL
BH CV LOWER VASCULAR RIGHT COMMON FEMORAL AUGMENT: NORMAL
BH CV LOWER VASCULAR RIGHT COMMON FEMORAL COMPETENT: NORMAL
BH CV LOWER VASCULAR RIGHT COMMON FEMORAL COMPRESS: NORMAL
BH CV LOWER VASCULAR RIGHT COMMON FEMORAL PHASIC: NORMAL
BH CV LOWER VASCULAR RIGHT COMMON FEMORAL SPONT: NORMAL
MAXIMAL PREDICTED HEART RATE: 125 BPM
STRESS TARGET HR: 106 BPM

## 2023-01-22 PROCEDURE — 93971 EXTREMITY STUDY: CPT

## 2023-01-22 RX ORDER — RIVAROXABAN 15 MG-20MG
KIT ORAL
Qty: 51 EACH | Refills: 0 | Status: SHIPPED | OUTPATIENT
Start: 2023-01-22

## 2023-01-22 RX ADMIN — RIVAROXABAN 15 MG: 15 TABLET, FILM COATED ORAL at 02:58

## 2023-01-22 NOTE — DISCHARGE INSTRUCTIONS
I have given you a paper prescription for a Xarelto starter pack do not start taking this medicine unless you are contacted and told that you have a DVT.

## 2023-01-22 NOTE — ED TRIAGE NOTES
".Pt masked on arrival, staff masked    Pt reports \"slight\" pain to left thigh that started about an hour ago, concerned for blood clot, denies recent long distant travel  "

## 2023-01-22 NOTE — ED PROVIDER NOTES
EMERGENCY DEPARTMENT ENCOUNTER    Room Number:  14/14  Date of encounter:  1/22/2023  PCP: Fanta Shankar MD  Patient Care Team:  Fanta Shankar MD as PCP - General  Fanta Shankar MD as PCP - Family Medicine  Braulio Sweet III, MD as Consulting Physician (Cardiology)  José Antonio Cm MD as Consulting Physician (Hematology and Oncology)  Fanta Shankar MD as Referring Physician (Internal Medicine)   Independent Historians: Patient, daughter    HPI:  Chief Complaint: Left leg pain  A complete HPI/ROS/PMH/PSH/SH/FH are unobtainable due to: None    Chronic or social conditions impacting patient care (social determinants of health): None    Context: Shaina Mcknight is a 89 y.o. female who presents to the ED c/o pain left leg is started proximate hour before arrival in the ER.  Patient is concerned she may have a blood clot.  No chest pain or shortness of breath no abdominal pain no fevers or chills patient denies any injury.  Patient denies any history of lung or brain neoplasm.    Review of prior external notes (non-ED): I have reviewed patient's discharge summary from 4/4/2022    Review of prior external test results outside of this encounter: I reviewed patient's CBC from 7/18/2022 I reviewed patient's CMP from 6/15/2022    PAST MEDICAL HISTORY  Active Ambulatory Problems     Diagnosis Date Noted   • TB (tuberculosis) 09/01/2017   • Dyspnea on exertion 09/01/2017   • Nonrheumatic aortic valve stenosis 09/01/2017   • Community acquired pneumonia of right lower lobe of lung 11/04/2018   • Leukocytosis 11/04/2018   • Bacterial pneumonia 11/04/2018   • Sepsis (HCC) 11/04/2018   • Esophageal dysphagia 03/25/2022   • Rojas esophagus 2018   • Anemia 1950   • Zenker's diverticulum 03/25/2022   • Elevated blood pressure reading without diagnosis of hypertension 04/01/2022   • Bilateral carotid artery stenosis    • First degree AV block 04/28/2022   • Senile osteoporosis 07/07/2022     Resolved Ambulatory  Problems     Diagnosis Date Noted   • Acute respiratory failure with hypoxia ([facility]) 11/04/2018   • Aortic valve regurgitation    • Leukocytosis 03/28/2022   • Ketosis ([facility]) 03/28/2022   • Hyperglycemia 04/01/2022     Past Medical History:   Diagnosis Date   • Acute pain of left shoulder due to trauma    • Aortic valve stenosis    • Cancer ([facility]) 1990   • SPENCER (dyspnea on exertion)    • Esophageal varices ([facility]) 2018   • Fall    • GERD (gastroesophageal reflux disease) 2018   • Heart murmur    • Mitral valve regurgitation    • Pulmonary valve regurgitation    • Tricuspid valve regurgitation        The patient has started, but not completed, their COVID-19 vaccination series.    PAST SURGICAL HISTORY  Past Surgical History:   Procedure Laterality Date   • CATARACT EXTRACTION Bilateral    • HYSTERECTOMY     • TONSILLECTOMY AND ADENOIDECTOMY     • ZENKERS DIVERTICULECTOMY N/A 3/31/2022    Procedure: ZENKERS DIVERTICULECTOMY WITH MYOTOMY, ESOPHAGOSCOPY;  Surgeon: Chilango Louis III, MD;  Location: [facility];  Service: Thoracic;  Laterality: N/A;         FAMILY HISTORY  Family History   Problem Relation Age of Onset   • Cancer Mother         bladder   • Heart attack Father    • Heart disease Father    • Sudden death Father    • Leukemia Father    • Heart attack Brother    • Heart disease Brother    • Sudden death Brother    • Cancer Son          SOCIAL HISTORY  Social History     Socioeconomic History   • Marital status:    Tobacco Use   • Smoking status: Never   • Smokeless tobacco: Never   • Tobacco comments:     caffine use   Vaping Use   • Vaping Use: Never used   Substance and Sexual Activity   • Alcohol use: Yes     Alcohol/week: 5.0 standard drinks     Types: 5 Glasses of wine per week     Comment: daily  4 ounce wine   • Drug use: No   • Sexual activity: Defer         ALLERGIES  Molds & smuts, Penicillins, Tessalon [benzonatate], and Oyster shell        REVIEW OF SYSTEMS  Review of Systems     All  systems reviewed and negative except for those discussed in HPI.       PHYSICAL EXAM    I have reviewed the triage vital signs and nursing notes.    ED Triage Vitals   Temp Heart Rate Resp BP SpO2   01/21/23 2223 01/21/23 2223 01/21/23 2223 01/21/23 2226 01/21/23 2223   97.9 °F (36.6 °C) 99 18 (!) 195/99 92 %      Temp src Heart Rate Source Patient Position BP Location FiO2 (%)   -- -- -- -- --              Physical Exam  GENERAL: alert, no acute distress  SKIN: Warm, dry  HENT: Normocephalic, atraumatic  EYES: no scleral icterus  CV: regular rhythm, regular rate  RESPIRATORY: normal effort, lungs clear  ABDOMEN: soft, nontender, nondistended  MUSCULOSKELETAL: no deformity, no significant muscular deformity patient does report some minimal tenderness to the left medial groin there is no posterior leg tenderness, feet are well-perfused strong peripheral pulses  NEURO: alert, moves all extremities, follows commands          LAB RESULTS  No results found for this or any previous visit (from the past 24 hour(s)).    Ordered the above labs and independently reviewed the results.        RADIOLOGY  No Radiology Exams Resulted Within Past 24 Hours    I ordered the above noted radiological studies. Reviewed by me and discussed with radiologist.  See dictation for official radiology interpretation.      PROCEDURES    Procedures      MEDICATIONS GIVEN IN ER    Medications   rivaroxaban (XARELTO) tablet 15 mg (15 mg Oral Given 1/22/23 0258)         PROGRESS, DATA ANALYSIS, CONSULTS, AND MEDICAL DECISION MAKING    All labs have been independently reviewed by me.  All radiology studies have been reviewed by me and discussed with radiologist dictating the report.   EKG's independently viewed and interpreted by me.  Discussion below represents my analysis of pertinent findings related to patient's condition, differential diagnosis, treatment plan and final disposition.    Differential diagnosis includes but is not limited to DVT,  muscle strain, arterial obstruction.    ED Course as of 01/22/23 0712   Sun Jan 22, 2023   0250 I have offered patient admission for ultrasound of her lower extremity.  Patient preferred to be discharged home and follow-up with outpatient ultrasound.  I am agreeable with this plan. [TJ]   0310 I have called and left a message with the noninvasive vascular lab. [TJ]      ED Course User Index  [TJ] Brooks Vazquez MD           PPE: The patient wore a mask and I wore an N95 mask throughout the entire patient encounter.       AS OF 07:12 EST VITALS:    BP - (!) 195/99  HR - 99  TEMP - 97.9 °F (36.6 °C)  O2 SATS - 92%        DIAGNOSIS  Final diagnoses:   Pain of left lower extremity         DISPOSITION  ED Disposition     ED Disposition   Discharge    Condition   Stable    Comment   --                Note Disclaimer: At [facility], we believe that sharing information builds trust and better relationships. You are receiving this note because you recently visited [facility]. It is possible you will see health information before a provider has talked with you about it. This kind of information can be easy to misunderstand. To help you fully understand what it means for your health, we urge you to discuss this note with your provider.       Brooks Vazquez MD  01/22/23 0712

## 2023-04-10 ENCOUNTER — HOSPITAL ENCOUNTER (OUTPATIENT)
Dept: GENERAL RADIOLOGY | Facility: HOSPITAL | Age: 88
Discharge: HOME | End: 2023-04-10
Payer: MEDICARE

## 2023-04-10 DIAGNOSIS — M19.90 INFLAMMATORY ARTHRITIS: ICD-10-CM

## 2023-04-10 PROCEDURE — 73130 X-RAY EXAM OF HAND: CPT

## 2023-04-10 PROCEDURE — 73110 X-RAY EXAM OF WRIST: CPT

## 2023-04-19 ENCOUNTER — APPOINTMENT (OUTPATIENT)
Dept: CT IMAGING | Facility: HOSPITAL | Age: 88
End: 2023-04-19
Payer: MEDICARE

## 2023-04-19 ENCOUNTER — HOSPITAL ENCOUNTER (OUTPATIENT)
Facility: HOSPITAL | Age: 88
Setting detail: OBSERVATION
Discharge: HOME | End: 2023-04-20
Attending: EMERGENCY MEDICINE | Admitting: EMERGENCY MEDICINE
Payer: MEDICARE

## 2023-04-19 ENCOUNTER — APPOINTMENT (OUTPATIENT)
Dept: GENERAL RADIOLOGY | Facility: HOSPITAL | Age: 88
End: 2023-04-19
Payer: MEDICARE

## 2023-04-19 DIAGNOSIS — M81.0 SENILE OSTEOPOROSIS: ICD-10-CM

## 2023-04-19 DIAGNOSIS — R55 SYNCOPE, UNSPECIFIED SYNCOPE TYPE: Primary | ICD-10-CM

## 2023-04-19 DIAGNOSIS — D72.829 LEUKOCYTOSIS, UNSPECIFIED TYPE: ICD-10-CM

## 2023-04-19 LAB
ALBUMIN SERPL-MCNC: 3.4 G/DL (ref 3.5–5.2)
ALBUMIN/GLOB SERPL: 1.3 G/DL
ALP SERPL-CCNC: 97 U/L (ref 39–117)
ALT SERPL W P-5'-P-CCNC: 17 U/L (ref 1–33)
ANION GAP SERPL CALCULATED.3IONS-SCNC: 8.8 MMOL/L (ref 5–15)
AST SERPL-CCNC: 18 U/L (ref 1–32)
BACTERIA UR QL AUTO: ABNORMAL /HPF
BASOPHILS # BLD AUTO: 0.08 10*3/MM3 (ref 0–0.2)
BASOPHILS NFR BLD AUTO: 0.3 % (ref 0–1.5)
BILIRUB SERPL-MCNC: 0.4 MG/DL (ref 0–1.2)
BILIRUB UR QL STRIP: NEGATIVE
BUN SERPL-MCNC: 35 MG/DL (ref 8–23)
BUN/CREAT SERPL: 26.1 (ref 7–25)
CALCIUM SPEC-SCNC: 9 MG/DL (ref 8.2–9.6)
CHLORIDE SERPL-SCNC: 95 MMOL/L (ref 98–107)
CK SERPL-CCNC: 38 U/L (ref 20–180)
CLARITY UR: CLEAR
CO2 SERPL-SCNC: 31.2 MMOL/L (ref 22–29)
COLOR UR: YELLOW
CREAT SERPL-MCNC: 1.34 MG/DL (ref 0.57–1)
D-LACTATE SERPL-SCNC: 1.7 MMOL/L (ref 0.5–2)
DEPRECATED RDW RBC AUTO: 44.1 FL (ref 37–54)
EGFRCR SERPLBLD CKD-EPI 2021: 36.6 ML/MIN/1.73
EOSINOPHIL # BLD AUTO: 0.41 10*3/MM3 (ref 0–0.4)
EOSINOPHIL NFR BLD AUTO: 1.7 % (ref 0.3–6.2)
ERYTHROCYTE [DISTWIDTH] IN BLOOD BY AUTOMATED COUNT: 13.2 % (ref 12.3–15.4)
GEN 5 2HR TROPONIN T REFLEX: 25 NG/L
GLOBULIN UR ELPH-MCNC: 2.6 GM/DL
GLUCOSE SERPL-MCNC: 111 MG/DL (ref 65–99)
GLUCOSE UR STRIP-MCNC: NEGATIVE MG/DL
HCT VFR BLD AUTO: 33 % (ref 34–46.6)
HGB BLD-MCNC: 11 G/DL (ref 12–15.9)
HGB UR QL STRIP.AUTO: ABNORMAL
HYALINE CASTS UR QL AUTO: ABNORMAL /LPF
IMM GRANULOCYTES # BLD AUTO: 0.91 10*3/MM3 (ref 0–0.05)
IMM GRANULOCYTES NFR BLD AUTO: 3.7 % (ref 0–0.5)
KETONES UR QL STRIP: NEGATIVE
LEUKOCYTE ESTERASE UR QL STRIP.AUTO: NEGATIVE
LYMPHOCYTES # BLD AUTO: 6.95 10*3/MM3 (ref 0.7–3.1)
LYMPHOCYTES NFR BLD AUTO: 28.3 % (ref 19.6–45.3)
MAGNESIUM SERPL-MCNC: 1.9 MG/DL (ref 1.7–2.3)
MCH RBC QN AUTO: 30.4 PG (ref 26.6–33)
MCHC RBC AUTO-ENTMCNC: 33.3 G/DL (ref 31.5–35.7)
MCV RBC AUTO: 91.2 FL (ref 79–97)
MONOCYTES # BLD AUTO: 2.11 10*3/MM3 (ref 0.1–0.9)
MONOCYTES NFR BLD AUTO: 8.6 % (ref 5–12)
NEUTROPHILS NFR BLD AUTO: 14.06 10*3/MM3 (ref 1.7–7)
NEUTROPHILS NFR BLD AUTO: 57.4 % (ref 42.7–76)
NITRITE UR QL STRIP: NEGATIVE
NRBC BLD AUTO-RTO: 0 /100 WBC (ref 0–0.2)
PH UR STRIP.AUTO: 7.5 [PH] (ref 5–8)
PLATELET # BLD AUTO: 307 10*3/MM3 (ref 140–450)
PMV BLD AUTO: 9.4 FL (ref 6–12)
POTASSIUM SERPL-SCNC: 4.5 MMOL/L (ref 3.5–5.2)
PROCALCITONIN SERPL-MCNC: 0.08 NG/ML (ref 0–0.25)
PROT SERPL-MCNC: 6 G/DL (ref 6–8.5)
PROT UR QL STRIP: NEGATIVE
QT INTERVAL: 352 MS
RBC # BLD AUTO: 3.62 10*6/MM3 (ref 3.77–5.28)
RBC # UR STRIP: ABNORMAL /HPF
REF LAB TEST METHOD: ABNORMAL
SODIUM SERPL-SCNC: 135 MMOL/L (ref 136–145)
SP GR UR STRIP: 1.01 (ref 1–1.03)
SQUAMOUS #/AREA URNS HPF: ABNORMAL /HPF
TROPONIN T DELTA: -3 NG/L
TROPONIN T SERPL HS-MCNC: 28 NG/L
UROBILINOGEN UR QL STRIP: ABNORMAL
WBC # UR STRIP: ABNORMAL /HPF
WBC NRBC COR # BLD: 24.52 10*3/MM3 (ref 3.4–10.8)

## 2023-04-19 PROCEDURE — G0378 HOSPITAL OBSERVATION PER HR: HCPCS

## 2023-04-19 PROCEDURE — 87040 BLOOD CULTURE FOR BACTERIA: CPT | Performed by: EMERGENCY MEDICINE

## 2023-04-19 PROCEDURE — 81001 URINALYSIS AUTO W/SCOPE: CPT | Performed by: EMERGENCY MEDICINE

## 2023-04-19 PROCEDURE — P9612 CATHETERIZE FOR URINE SPEC: HCPCS

## 2023-04-19 PROCEDURE — 70450 CT HEAD/BRAIN W/O DYE: CPT

## 2023-04-19 PROCEDURE — 87076 CULTURE ANAEROBE IDENT EACH: CPT | Performed by: EMERGENCY MEDICINE

## 2023-04-19 PROCEDURE — 83605 ASSAY OF LACTIC ACID: CPT | Performed by: EMERGENCY MEDICINE

## 2023-04-19 PROCEDURE — 82550 ASSAY OF CK (CPK): CPT | Performed by: EMERGENCY MEDICINE

## 2023-04-19 PROCEDURE — 87150 DNA/RNA AMPLIFIED PROBE: CPT | Performed by: EMERGENCY MEDICINE

## 2023-04-19 PROCEDURE — 84484 ASSAY OF TROPONIN QUANT: CPT | Performed by: EMERGENCY MEDICINE

## 2023-04-19 PROCEDURE — 85025 COMPLETE CBC W/AUTO DIFF WBC: CPT | Performed by: EMERGENCY MEDICINE

## 2023-04-19 PROCEDURE — 99285 EMERGENCY DEPT VISIT HI MDM: CPT

## 2023-04-19 PROCEDURE — 71045 X-RAY EXAM CHEST 1 VIEW: CPT

## 2023-04-19 PROCEDURE — 80053 COMPREHEN METABOLIC PANEL: CPT | Performed by: EMERGENCY MEDICINE

## 2023-04-19 PROCEDURE — 84145 PROCALCITONIN (PCT): CPT | Performed by: EMERGENCY MEDICINE

## 2023-04-19 PROCEDURE — 93010 ELECTROCARDIOGRAM REPORT: CPT | Performed by: INTERNAL MEDICINE

## 2023-04-19 PROCEDURE — 83735 ASSAY OF MAGNESIUM: CPT | Performed by: EMERGENCY MEDICINE

## 2023-04-19 PROCEDURE — 36415 COLL VENOUS BLD VENIPUNCTURE: CPT

## 2023-04-19 PROCEDURE — 93005 ELECTROCARDIOGRAM TRACING: CPT | Performed by: EMERGENCY MEDICINE

## 2023-04-19 RX ORDER — VITAMIN B COMPLEX
1 CAPSULE ORAL DAILY
COMMUNITY

## 2023-04-19 RX ORDER — FERROUS SULFATE 325(65) MG
325 TABLET ORAL
Status: DISCONTINUED | OUTPATIENT
Start: 2023-04-20 | End: 2023-04-20 | Stop reason: HOSPADM

## 2023-04-19 RX ORDER — SODIUM CHLORIDE 0.9 % (FLUSH) 0.9 %
10 SYRINGE (ML) INJECTION EVERY 12 HOURS SCHEDULED
Status: DISCONTINUED | OUTPATIENT
Start: 2023-04-19 | End: 2023-04-20 | Stop reason: HOSPADM

## 2023-04-19 RX ORDER — SODIUM CHLORIDE 9 MG/ML
40 INJECTION, SOLUTION INTRAVENOUS AS NEEDED
Status: DISCONTINUED | OUTPATIENT
Start: 2023-04-19 | End: 2023-04-20 | Stop reason: HOSPADM

## 2023-04-19 RX ORDER — SODIUM CHLORIDE 0.9 % (FLUSH) 0.9 %
10 SYRINGE (ML) INJECTION AS NEEDED
Status: DISCONTINUED | OUTPATIENT
Start: 2023-04-19 | End: 2023-04-20 | Stop reason: HOSPADM

## 2023-04-19 RX ORDER — FERROUS SULFATE 325(65) MG
325 TABLET ORAL
COMMUNITY

## 2023-04-19 RX ADMIN — Medication 10 ML: at 20:00

## 2023-04-19 RX ADMIN — Medication 10 ML: at 15:34

## 2023-04-19 RX ADMIN — SODIUM CHLORIDE 1000 ML: 9 INJECTION, SOLUTION INTRAVENOUS at 14:03

## 2023-04-19 NOTE — PROGRESS NOTES
Clinical Pharmacy Services: Medication History    Shaina Mcknight is a 89 y.o. female presenting to [facility] for   Chief Complaint   Patient presents with   • Syncope       She  has a past medical history of Acute pain of left shoulder due to trauma, Anemia (1950), Aortic valve regurgitation, Aortic valve stenosis, Rojas esophagus (2018), Bilateral carotid artery stenosis, Cancer (1990), SPENCER (dyspnea on exertion), Esophageal varices (2018), Fall, GERD (gastroesophageal reflux disease) (2018), Heart murmur, Mitral valve regurgitation, Pulmonary valve regurgitation, Syncope (4/19/2023), and Tricuspid valve regurgitation.    Allergies as of 04/19/2023 - Reviewed 04/19/2023   Allergen Reaction Noted   • Molds & smuts Shortness Of Breath 11/04/2018   • Penicillins Hives 11/10/2016   • Tessalon [benzonatate] Delirium 11/10/2018   • Oyster shell Diarrhea 08/17/2017       Medication information was obtained from: Patient   Pharmacy and Phone Number:     Prior to Admission Medications     Prescriptions Last Dose Informant Patient Reported? Taking?    B Complex Vitamins (vitamin b complex) capsule capsule 4/19/2023 Self Yes Yes    Take 1 capsule by mouth Daily.    Calcium Carbonate 1500 (600 Ca) MG tablet 4/19/2023 Self Yes Yes    Take 1 tablet by mouth Daily.    ferrous sulfate 325 (65 FE) MG tablet 4/19/2023 Self Yes Yes    Take 1 tablet by mouth Daily With Breakfast.    Misc Natural Products (OSTEO BI-FLEX ADV TRIPLE ST PO) 4/19/2023 Self Yes Yes    Take 1 tablet by mouth Daily.    MULTIPLE VITAMIN PO 4/19/2023 Self Yes Yes    Take 1 tablet by mouth Daily.    Vitamin D, Cholecalciferol, 1000 units capsule 4/19/2023 Self Yes Yes    Take 1 capsule by mouth Daily.    pantoprazole (PROTONIX) 40 MG EC tablet   Yes No    Take 1 tablet by mouth Daily.    Rivaroxaban (Xarelto Starter Pack) tablet therapy pack starter pack   No No    Take one 15 mg tablet twice daily with food for 21 days.  Followed by one 20  mg tablet by mouth once daily with food. Take as directed            Medication notes: Patient confirmed she is not taking any prescription medications. Patient states all she is currently taking is vitamins and supplements.     This medication list is complete to the best of my knowledge as of 4/19/2023    Please call if questions.    David Reilly  Medication History Technician  [phone]    4/19/2023 15:19 EDT

## 2023-04-19 NOTE — H&P
[facility]   HISTORY AND PHYSICAL    Patient Name: Shaina Mcknight  : 10/4/1927  MRN: [id number]  Primary Care Physician:  Fanta Shankar MD  Date of admission: 2023    Subjective   Subjective     Chief Complaint: Syncope    History of Present Illness  Shaina Diaz is a 89-year-old female, with a past medical history of hypertension, presented to the emergency department with a complaint of a syncopal episode while at [employer] this morning.  Patient's daughter, who was present, states that she started breathing heavily and then just collapsed.  She states that it took her about 10 minutes to become aware of what was going on and she had a second syncopal episode.  EMS was called and the patient then had a third episode of syncope on her way to the emergency department with EMS.  The initial 2 episodes of syncope were both as she was sitting and she sustained no injuries.  The daughter states that there was no seizure-like activity with either of the first 2 episodes.  Patient does not remember any of these episodes and states that she has felt fine other than being  a little tired.  Ms. Mcknight still lives at home by herself and able to take care of herself in her home, and she still drives.  Review of Systems   Constitutional: Positive for fatigue. Negative for fever.   HENT: Negative.    Eyes: Negative.    Respiratory: Negative.  Negative for cough and shortness of breath.    Cardiovascular: Negative.  Negative for chest pain.   Gastrointestinal: Negative.  Negative for nausea and vomiting.   Endocrine: Negative.    Genitourinary: Negative.    Musculoskeletal: Negative.    Skin: Negative.    Allergic/Immunologic: Negative.    Neurological: Positive for syncope. Negative for dizziness, seizures, weakness and headaches.   Hematological: Negative.    Psychiatric/Behavioral: Negative.         Personal History     Past Medical History:   Diagnosis Date   • Acute pain of left shoulder due to trauma    •  Anemia 1950   • Aortic valve regurgitation     trace   • Aortic valve stenosis     mild   • Rojas esophagus 2018   • Bilateral carotid artery stenosis    • Cancer 1990    some skin cancers   • SPENCER (dyspnea on exertion)    • Esophageal varices 2018   • Fall     going up the steps   • GERD (gastroesophageal reflux disease) 2018   • Heart murmur    • Mitral valve regurgitation     mild to moderate   • Pulmonary valve regurgitation     trace   • Syncope 4/19/2023   • Tricuspid valve regurgitation     mild to moderate       Past Surgical History:   Procedure Laterality Date   • CATARACT EXTRACTION Bilateral    • HYSTERECTOMY     • TONSILLECTOMY AND ADENOIDECTOMY     • ZENKERS DIVERTICULECTOMY N/A 3/31/2022    Procedure: ZENKERS DIVERTICULECTOMY WITH MYOTOMY, ESOPHAGOSCOPY;  Surgeon: Chilango Louis III, MD;  Location: [facility] OR;  Service: Thoracic;  Laterality: N/A;       Family History: family history includes Cancer in her mother and son; Heart attack in her brother and father; Heart disease in her brother and father; Leukemia in her father; Sudden death in her brother and father. Otherwise pertinent FHx was reviewed and not pertinent to current issue.    Social History:  reports that she has never smoked. She has never used smokeless tobacco. She reports current alcohol use of about 5.0 standard drinks per week. She reports that she does not use drugs.    Home Medications:  Calcium Carbonate, Misc Natural Products, Rivaroxaban, Vitamin D (Cholecalciferol), ferrous sulfate, multivitamin, pantoprazole, and vitamin b complex    Allergies:  Allergies   Allergen Reactions   • Molds & Smuts Shortness Of Breath   • Penicillins Hives   • Tessalon [Benzonatate] Delirium   • Oyster Shell Diarrhea       Objective    Objective     Vitals:   Temp:  [97.9 °F (36.6 °C)-98 °F (36.7 °C)] 98 °F (36.7 °C)  Heart Rate:  [79-96] 95  Resp:  [16-18] 18  BP: (100-135)/(62-75) 135/67    Physical Exam  Vitals and nursing note  reviewed.   Constitutional:       General: She is not in acute distress.     Appearance: Normal appearance. She is not ill-appearing.   Cardiovascular:      Rate and Rhythm: Normal rate and regular rhythm.      Pulses: Normal pulses.      Heart sounds: Normal heart sounds.   Pulmonary:      Effort: Pulmonary effort is normal.      Breath sounds: Normal breath sounds.   Abdominal:      General: Bowel sounds are normal.      Palpations: Abdomen is soft.   Musculoskeletal:         General: Normal range of motion.   Skin:     General: Skin is warm and dry.      Capillary Refill: Capillary refill takes less than 2 seconds.   Neurological:      General: No focal deficit present.      Mental Status: She is alert and oriented to person, place, and time.   Psychiatric:         Mood and Affect: Mood normal.         Behavior: Behavior normal.         Thought Content: Thought content normal.         Judgment: Judgment normal.         Result Review    Result Review:  I have personally reviewed the results from the time of this admission to 4/19/2023 19:26 EDT and agree with these findings:  [x]  Laboratory list / accordion  [x]  Microbiology  [x]  Radiology  []  EKG/Telemetry   []  Cardiology/Vascular   []  Pathology  [x]  Old records  []  Other:    Assessment & Plan   Assessment / Plan     Brief Patient Summary:  Shaina Mcknight is a 89 y.o. female who was admitted to the observation unit for further evaluation and treatment of her syncopal episodes.    Active Hospital Problems:  Active Hospital Problems    Diagnosis    • **Syncope      Plan:   Syncope  -Cardiac monitoring  -Vital signs every 4 hours  -Cardiology consult  -High-sensitivity troponin 28, 25, delta -3  -EKG-sinus rhythm  -N.p.o. after midnight    Hypertension  -Monitor blood pressure    DVT prophylaxis:  Mechanical DVT prophylaxis orders are present.    CODE STATUS:    Medical Intervention Limits: NO intubation (DNI)  Code Status (Patient has no pulse and is not  breathing): No CPR (Do Not Attempt to Resuscitate)  Medical Interventions (Patient has pulse or is breathing): Limited Support  Release to patient: Routine Release    Admission Status:  I believe this patient meets observation status.    78 minutes has been spent by [facility] providers in the care of this patient while under observation status.      Susan Carmona, APRN

## 2023-04-19 NOTE — CASE MANAGEMENT/SOCIAL WORK
Discharge Planning Assessment  [facility]     Patient Name: Shaina Mcknight  MRN: [id number]  Today's Date: 4/19/2023    Admit Date: 4/19/2023        Discharge Needs Assessment     Row Name 04/19/23 1702       Living Environment    People in Home child(mae), adult    Name(s) of People in Home sonKatia Mcknight    Current Living Arrangements home    Primary Care Provided by self;child(mae)    Provides Primary Care For no one, unable/limited ability to care for self    Family Caregiver if Needed child(mae), adult    Family Caregiver Names Ketan Mcknight- sonKathleen    Quality of Family Relationships helpful;involved;supportive    Able to Return to Prior Arrangements --  to be determined       Resource/Environmental Concerns    Resource/Environmental Concerns none       Food Insecurity    Within the past 12 months, you worried that your food would run out before you got the money to buy more. Never true    Within the past 12 months, the food you bought just didn't last and you didn't have money to get more. Never true       Transition Planning    Patient/Family Anticipates Transition to --  to be determined    Patient/Family Anticipated Services at Transition --  determined    Transportation Anticipated family or friend will provide       Discharge Needs Assessment    Equipment Currently Used at Home cane, straight;walker, standard;commode    Concerns to be Addressed discharge planning;decision-making    Anticipated Changes Related to Illness inability to care for self    Equipment Needed After Discharge --  to be determined    Outpatient/Agency/Support Group Needs --  TBD    Discharge Facility/Level of Care Needs --  TBD    Current Discharge Risk physical impairment;chronically ill               Discharge Plan     Row Name 04/19/23 5082       Plan    Plan Comments Entered room, introduced self and explained role to patient, and daughter Kathleen at bedside (POA)- received permission to speak to daughter-  Kathleen who advises patient lives in a house alone- son Ketan Mcknight stays w/patient part time- patient ambulates w/assist of walker, still drives- has a cane, walker, bedside commode and hearing aids. Previously patient has gone to [facility] for rehab- Verified PCP listed on facesheet; RX are filled at [facility] in [facility]- patient was at [facility] today and had a syncopal episode- pt transferred to observation unit for further evaluation. At this time, pt and family plans for her to return home upon d/c unless otherwise recommended. No further needs at this time.              Continued Care and Services - Admitted Since 4/19/2023    Coordination has not been started for this encounter.          Demographic Summary     Row Name 04/19/23 1707       General Information    Admission Type observation    Arrived From home    Required Notices Provided Observation Status Notice    Reason for Consult decision-making;discharge planning    Preferred Language English       Contact Information    Permission Granted to Share Info With ;family/designee               Functional Status     Row Name 04/19/23 1706       Functional Status    Usual Activity Tolerance good    Current Activity Tolerance fair       Functional Status, IADL    Medications independent;assistive person    Meal Preparation assistive person    Housekeeping assistive person    Laundry assistive person    Shopping assistive person       Mental Status    General Appearance WDL WDL       Mental Status Summary    Recent Changes in Mental Status/Cognitive Functioning no changes       Employment/    Employment Status retired               Psychosocial    No documentation.                Abuse/Neglect    No documentation.                Legal    No documentation.                Substance Abuse    No documentation.                Patient Forms    No documentation.                   Brittanie Adorno RN

## 2023-04-19 NOTE — ED TRIAGE NOTES
Patient had syncopal episode at [facility] today and then another with EMS in route to ER. EMS states patient was briefly unresponsive after episode. Patient Alert and answering questions on arrival to ER

## 2023-04-19 NOTE — ED PROVIDER NOTES
EMERGENCY DEPARTMENT ENCOUNTER    Room Number:  14/14  Date of encounter:  4/19/2023  PCP: Fanta Shankar MD  Historian: Patient, EMS      HPI:  Chief Complaint: Syncope  A complete HPI/ROS/PMH/PSH/SH/FH are unobtainable due to: None    Context: Shaina Mcknight is a 89 y.o. female who presents to the ED via [facility]'s EMS from [facility] today when she had multiple syncopal episodes.  On EMS arrival she had a second syncopal arrival with no seizure-like activity.  No hypoxia or significant hypertension, no recent falls, head injuries, fevers, chills, cough, chest pain, shortness breath, abdominal pain, vomiting, diarrhea.  She denies any dysuria or urinary urgency or frequency.  She does have urinary incontinence.  Patient is a DNR.  She denies any current complaints.  She reports eating and drinking well today.      MEDICAL RECORD REVIEW    External (non-ED) record review: Adult transthoracic echo March 29, 2022 shows an ejection fraction of 66 to 70%, grade 1 diastolic dysfunction, mild to moderate aortic stenosis    PAST MEDICAL HISTORY  Active Ambulatory Problems     Diagnosis Date Noted   • TB (tuberculosis) 09/01/2017   • Dyspnea on exertion 09/01/2017   • Nonrheumatic aortic valve stenosis 09/01/2017   • Community acquired pneumonia of right lower lobe of lung 11/04/2018   • Leukocytosis 11/04/2018   • Bacterial pneumonia 11/04/2018   • Sepsis 11/04/2018   • Esophageal dysphagia 03/25/2022   • Rojas esophagus 2018   • Anemia 1950   • Zenker's diverticulum 03/25/2022   • Elevated blood pressure reading without diagnosis of hypertension 04/01/2022   • Bilateral carotid artery stenosis    • First degree AV block 04/28/2022   • Senile osteoporosis 07/07/2022     Resolved Ambulatory Problems     Diagnosis Date Noted   • Acute respiratory failure with hypoxia 11/04/2018   • Aortic valve regurgitation    • Leukocytosis 03/28/2022   • Ketosis 03/28/2022   • Hyperglycemia 04/01/2022     Past Medical History:    Diagnosis Date   • Acute pain of left shoulder due to trauma    • Aortic valve stenosis    • Cancer 1990   • SPENCER (dyspnea on exertion)    • Esophageal varices 2018   • Fall    • GERD (gastroesophageal reflux disease) 2018   • Heart murmur    • Mitral valve regurgitation    • Pulmonary valve regurgitation    • Syncope 4/19/2023   • Tricuspid valve regurgitation          PAST SURGICAL HISTORY  Past Surgical History:   Procedure Laterality Date   • CATARACT EXTRACTION Bilateral    • HYSTERECTOMY     • TONSILLECTOMY AND ADENOIDECTOMY     • ZENKERS DIVERTICULECTOMY N/A 3/31/2022    Procedure: ZENKERS DIVERTICULECTOMY WITH MYOTOMY, ESOPHAGOSCOPY;  Surgeon: Chilango Louis III, MD;  Location: [facility] MAIN OR;  Service: Thoracic;  Laterality: N/A;         FAMILY HISTORY  Family History   Problem Relation Age of Onset   • Cancer Mother         bladder   • Heart attack Father    • Heart disease Father    • Sudden death Father    • Leukemia Father    • Heart attack Brother    • Heart disease Brother    • Sudden death Brother    • Cancer Son          SOCIAL HISTORY  Social History     Socioeconomic History   • Marital status:    Tobacco Use   • Smoking status: Never   • Smokeless tobacco: Never   • Tobacco comments:     caffine use   Vaping Use   • Vaping Use: Never used   Substance and Sexual Activity   • Alcohol use: Yes     Alcohol/week: 5.0 standard drinks     Types: 5 Glasses of wine per week     Comment: daily  4 ounce wine   • Drug use: No   • Sexual activity: Defer         ALLERGIES  Molds & smuts, Penicillins, Tessalon [benzonatate], and Oyster shell        REVIEW OF SYSTEMS  Review of Systems     All systems reviewed and negative except for those discussed in HPI.       PHYSICAL EXAM    I have reviewed the triage vital signs and nursing notes.    ED Triage Vitals   Temp Heart Rate Resp BP SpO2   04/19/23 1221 04/19/23 1216 04/19/23 1216 04/19/23 1216 04/19/23 1216   97.9 °F (36.6 °C) 83 16 126/67 98 %       Temp src Heart Rate Source Patient Position BP Location FiO2 (%)   04/19/23 1221 04/19/23 1221 -- -- --   Oral Monitor          Physical Exam  General: No acute distress, nontoxic  HEENT: Mucous membranes moist, atraumatic, EOMI  Neck: Full ROM  Pulm: Symmetric chest rise, nonlabored, lungs CTAB  Cardiovascular: Regular rate and rhythm, intact distal pulses  GI: Soft, nontender, nondistended, no rebound, no guarding, bowel sounds present  MSK: Full ROM, no deformity  Skin: Warm, dry  Neuro: Awake, alert, oriented x 4, GCS 15, moving all extremities, no focal deficits  Psych: Calm, cooperative        LAB RESULTS  Recent Results (from the past 24 hour(s))   Comprehensive Metabolic Panel    Collection Time: 04/19/23 12:24 PM    Specimen: Blood   Result Value Ref Range    Glucose 111 (H) 65 - 99 mg/dL    BUN 35 (H) 8 - 23 mg/dL    Creatinine 1.34 (H) 0.57 - 1.00 mg/dL    Sodium 135 (L) 136 - 145 mmol/L    Potassium 4.5 3.5 - 5.2 mmol/L    Chloride 95 (L) 98 - 107 mmol/L    CO2 31.2 (H) 22.0 - 29.0 mmol/L    Calcium 9.0 8.2 - 9.6 mg/dL    Total Protein 6.0 6.0 - 8.5 g/dL    Albumin 3.4 (L) 3.5 - 5.2 g/dL    ALT (SGPT) 17 1 - 33 U/L    AST (SGOT) 18 1 - 32 U/L    Alkaline Phosphatase 97 39 - 117 U/L    Total Bilirubin 0.4 0.0 - 1.2 mg/dL    Globulin 2.6 gm/dL    A/G Ratio 1.3 g/dL    BUN/Creatinine Ratio 26.1 (H) 7.0 - 25.0    Anion Gap 8.8 5.0 - 15.0 mmol/L    eGFR 36.6 (L) >60.0 mL/min/1.73   High Sensitivity Troponin T    Collection Time: 04/19/23 12:24 PM    Specimen: Blood   Result Value Ref Range    HS Troponin T 28 (H) <10 ng/L   CK    Collection Time: 04/19/23 12:24 PM    Specimen: Blood   Result Value Ref Range    Creatine Kinase 38 20 - 180 U/L   Magnesium    Collection Time: 04/19/23 12:24 PM    Specimen: Blood   Result Value Ref Range    Magnesium 1.9 1.7 - 2.3 mg/dL   CBC Auto Differential    Collection Time: 04/19/23 12:24 PM    Specimen: Blood   Result Value Ref Range    WBC 24.52 (H) 3.40 - 10.80  10*3/mm3    RBC 3.62 (L) 3.77 - 5.28 10*6/mm3    Hemoglobin 11.0 (L) 12.0 - 15.9 g/dL    Hematocrit 33.0 (L) 34.0 - 46.6 %    MCV 91.2 79.0 - 97.0 fL    MCH 30.4 26.6 - 33.0 pg    MCHC 33.3 31.5 - 35.7 g/dL    RDW 13.2 12.3 - 15.4 %    RDW-SD 44.1 37.0 - 54.0 fl    MPV 9.4 6.0 - 12.0 fL    Platelets 307 140 - 450 10*3/mm3    Neutrophil % 57.4 42.7 - 76.0 %    Lymphocyte % 28.3 19.6 - 45.3 %    Monocyte % 8.6 5.0 - 12.0 %    Eosinophil % 1.7 0.3 - 6.2 %    Basophil % 0.3 0.0 - 1.5 %    Immature Grans % 3.7 (H) 0.0 - 0.5 %    Neutrophils, Absolute 14.06 (H) 1.70 - 7.00 10*3/mm3    Lymphocytes, Absolute 6.95 (H) 0.70 - 3.10 10*3/mm3    Monocytes, Absolute 2.11 (H) 0.10 - 0.90 10*3/mm3    Eosinophils, Absolute 0.41 (H) 0.00 - 0.40 10*3/mm3    Basophils, Absolute 0.08 0.00 - 0.20 10*3/mm3    Immature Grans, Absolute 0.91 (H) 0.00 - 0.05 10*3/mm3    nRBC 0.0 0.0 - 0.2 /100 WBC   Procalcitonin    Collection Time: 04/19/23 12:24 PM    Specimen: Blood   Result Value Ref Range    Procalcitonin 0.08 0.00 - 0.25 ng/mL   ECG 12 Lead Syncope    Collection Time: 04/19/23 12:36 PM   Result Value Ref Range    QT Interval 352 ms   Lactic Acid, Plasma    Collection Time: 04/19/23  2:02 PM    Specimen: Blood   Result Value Ref Range    Lactate 1.7 0.5 - 2.0 mmol/L   Urinalysis With Microscopic If Indicated (No Culture) - Straight Cath    Collection Time: 04/19/23  2:09 PM    Specimen: Straight Cath; Urine   Result Value Ref Range    Color, UA Yellow Yellow, Straw    Appearance, UA Clear Clear    pH, UA 7.5 5.0 - 8.0    Specific Gravity, UA 1.011 1.005 - 1.030    Glucose, UA Negative Negative    Ketones, UA Negative Negative    Bilirubin, UA Negative Negative    Blood, UA Small (1+) (A) Negative    Protein, UA Negative Negative    Leuk Esterase, UA Negative Negative    Nitrite, UA Negative Negative    Urobilinogen, UA 0.2 E.U./dL 0.2 - 1.0 E.U./dL   Urinalysis, Microscopic Only - Straight Cath    Collection Time: 04/19/23  2:09 PM     Specimen: Straight Cath; Urine   Result Value Ref Range    RBC, UA 3-5 (A) None Seen, 0-2 /HPF    WBC, UA 0-2 None Seen, 0-2 /HPF    Bacteria, UA None Seen None Seen /HPF    Squamous Epithelial Cells, UA 0-2 None Seen, 0-2 /HPF    Hyaline Casts, UA 0-2 None Seen /LPF    Methodology Automated Microscopy        Ordered the above labs and independently interpreted results. My findings will be discussed in the medical decision making section below        RADIOLOGY  CT Head Without Contrast    Result Date: 4/19/2023  CT HEAD WO CONTRAST-  INDICATIONS: Syncope  TECHNIQUE: Radiation dose reduction techniques were utilized, including automated exposure control and exposure modulation based on body size. Noncontrast head CT  COMPARISON: 2/3/2017  FINDINGS:    No acute intracranial hemorrhage, midline shift or mass effect. No acute territorial infarct is identified.  Moderate to prominent periventricular hypodensities suggest chronic small vessel ischemic change in a patient this age.  Arterial calcifications are seen at the base of the brain.  Ventricles, cisterns, cerebral sulci are unremarkable for patient age.  Partial opacification of left mastoid air cells is seen. The visualized paranasal sinuses, orbits, mastoid air cells are otherwise unremarkable.           No acute intracranial hemorrhage or hydrocephalus. Chronic changes of the brain. If there is further clinical concern, MRI could be considered for further evaluation.  This report was finalized on 4/19/2023 2:19 PM by Dr. Javy Adams M.D.      XR Chest 1 View    Result Date: 4/19/2023  Portable chest radiograph  HISTORY:Syncope  TECHNIQUE: Single AP portable radiograph of the chest  COMPARISON:Chest radiograph 04/19/2022 and CT thoracic spine 04/20/2022      FINDINGS AND IMPRESSION: Lungs are hypoinflated. Mild bibasilar pulmonary opacification is present, left greater then right, grossly similar that seen on 04/19/2022. No pneumothorax or pleural effusion  is seen. No new pulmonary consolidation is seen. Cardiac silhouette is accentuated by low lung volumes. The trachea is deviated to the right, as before.  This report was finalized on 4/19/2023 1:19 PM by Dr. Raz Prieto M.D.        Ordered the above noted radiological studies.  Independently interpreted by me and my independent review of findings can be found in the ED Course.  See dictation for official radiology interpretation.      PROCEDURES    Procedures  EKG - Per my independent interpretation:    EKG Time: 1236  Rhythm/Rate: Sinus rhythm with rate of 84  Normal axis  Normal intervals  Isolated T wave inversion in lead III, no acute ischemic changes  No STEMI       No emergent changes compared to March 28, 2022      MEDICATIONS GIVEN IN ER    Medications   sodium chloride 0.9 % bolus 1,000 mL (1,000 mL Intravenous New Bag 4/19/23 1403)         PROGRESS, DATA ANALYSIS, CONSULTS, AND MEDICAL DECISION MAKING    Please note that this section constitutes my independent interpretation of clinical data including lab results, radiology, EKG's.  This constitutes my independent professional opinion regarding differential diagnosis and management of this patient.  It may include any factors such as history from outside sources, review of external records, social determinants of health, management of medications, response to those treatments, and discussions with other providers.    Differential Diagnosis and Plan: Initial concern for vasovagal episode, arrhythmia, anemia, electrolyte abnormalities, renal failure, UTI, intracranial hemorrhage, seizure, among others.  Plan for labs, CT scan, chest x-ray, urinalysis, and reevaluation with results.    Additional sources:  - Discussed/ obtained information from independent historians:   EMS and family provide additional history for the patient as she does not remember the events today     - Chronic or social conditions impacting care:      - Shared decision making:   "Patient and family at bedside fully updated on and in agreement with the course and plan moving forward    ED Course as of 04/19/23 1439   Wed Apr 19, 2023   1252 WBC(!): 24.52  13.69 nine months ago [DC]   1252 Hemoglobin(!): 11.0 [DC]   1252 Platelets: 307 [DC]   1308 Glucose(!): 111 [DC]   1308 BUN(!): 35 [DC]   1308 Creatinine(!): 1.34  1.28 ten months ago [DC]   1308 Sodium(!): 135 [DC]   1308 Potassium: 4.5 [DC]   1308 ALT (SGPT): 17 [DC]   1308 AST (SGOT): 18 [DC]   1308 Alkaline Phosphatase: 97 [DC]   1308 Total Bilirubin: 0.4 [DC]   1308 HS Troponin T(!): 28  No prior [DC]   1314 Creatine Kinase: 38 [DC]   1314 XR Chest 1 View  Per my independent interpretation of the chest x-ray, no evidence of pneumothorax [DC]   1338 Procalcitonin: 0.08 [DC]   1338 Magnesium: 1.9 [DC]   1347 Daughter at bedside states that the patient just completed a week of 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  She states that she does continue to drive and is \"fiercely independent\" [DC]   1433 Bacteria, UA: None Seen [DC]   1433 WBC, UA: 0-2 [DC]   1433 RBC, UA(!): 3-5 [DC]   1433 Nitrite, UA: Negative [DC]   1433 Leukocytes, UA: Negative [DC]   1433 CT Head Without Contrast  Radiology report reviewed, no evidence of any acute emergent findings [DC]   1436 Discussed with SONDRA Bo, observation unit, discussed patient's clinical course advisedly, treatment modalities, and will place in the observation unit for further monitoring overnight. [DC]   1438 Have discussed the findings today with patient and daughter at bedside, at this point suspect probably vagal episodes, I do not see any evidence of arrhythmia or acute emergent findings otherwise.  The leukocytosis as discussed above I think is related to the recent prednisone use, her hand at this point shows no signs of erythema or edema to suspect active infection.  Her procalcitonin is " negative.  CT head is unremarkable without concerning findings.  Presentation is not in line with CVA at this point.  Will recommend further monitoring overnight to make sure she has no further syncopal episodes or arrhythmias, and potentially discharge tomorrow. [DC]      ED Course User Index  [DC] Samuel Casillas MD       Hospitalization Considered?: yes    Orders Placed During This Visit:  Orders Placed This Encounter   Procedures   • Blood Culture - Blood,   • Blood Culture - Blood,   • CT Head Without Contrast   • XR Chest 1 View   • Comprehensive Metabolic Panel   • Urinalysis With Microscopic If Indicated (No Culture) - Urine, Catheter   • High Sensitivity Troponin T   • CK   • Magnesium   • CBC Auto Differential   • Lactic Acid, Plasma   • Procalcitonin   • High Sensitivity Troponin T 2Hr   • Urinalysis, Microscopic Only - Urine, Clean Catch   • Cardiac Monitoring   • ECG 12 Lead Syncope   • Initiate ED Observation Status   • CBC & Differential       Additional orders considered but not placed:      Independent interpretation of labs, radiology studies, and discussions with consultants: See ED Course        AS OF 14:39 EDT VITALS:    BP - 126/67  HR - 83  TEMP - 97.9 °F (36.6 °C) (Oral)  02 SATS - 100%        DIAGNOSIS  Final diagnoses:   Syncope, unspecified syncope type   Leukocytosis, unspecified type         DISPOSITION  HOSPITALIZATION    Discussed treatment plan and reason for hospitalization with pt/family and hospitalizing physician.  Pt/family voiced understanding of the plan for hospitalization for further testing/treatment as needed.                   --    Please note that portions of this were completed with a voice recognition program.       Note Disclaimer: At [facility], we believe that sharing information builds trust and better relationships. You are receiving this note because you are receiving care at [facility] or recently visited. It is possible you will see health information  before a provider has talked with you about it. This kind of information can be easy to misunderstand. To help you fully understand what it means for your health, we urge you to discuss this note with your provider.         Samuel Casillas MD  04/19/23 2093

## 2023-04-20 ENCOUNTER — APPOINTMENT (OUTPATIENT)
Dept: CARDIOLOGY | Facility: HOSPITAL | Age: 88
End: 2023-04-20
Payer: MEDICARE

## 2023-04-20 ENCOUNTER — HOME HEALTH ADMISSION (OUTPATIENT)
Dept: HOME HEALTH SERVICES | Facility: HOME HEALTHCARE | Age: 88
End: 2023-04-20
Payer: MEDICARE

## 2023-04-20 VITALS
SYSTOLIC BLOOD PRESSURE: 109 MMHG | OXYGEN SATURATION: 94 % | TEMPERATURE: 98.4 F | RESPIRATION RATE: 18 BRPM | WEIGHT: 141 LBS | HEIGHT: 60 IN | HEART RATE: 87 BPM | DIASTOLIC BLOOD PRESSURE: 60 MMHG | BODY MASS INDEX: 27.68 KG/M2

## 2023-04-20 LAB
ALBUMIN SERPL-MCNC: 2.6 G/DL (ref 3.5–5.2)
ALBUMIN/GLOB SERPL: 1 G/DL
ALP SERPL-CCNC: 81 U/L (ref 39–117)
ALT SERPL W P-5'-P-CCNC: 14 U/L (ref 1–33)
ANION GAP SERPL CALCULATED.3IONS-SCNC: 7.6 MMOL/L (ref 5–15)
AORTIC DIMENSIONLESS INDEX: 0.3 (DI)
AST SERPL-CCNC: 16 U/L (ref 1–32)
BASOPHILS # BLD AUTO: 0.03 10*3/MM3 (ref 0–0.2)
BASOPHILS NFR BLD AUTO: 0.2 % (ref 0–1.5)
BH CV ECHO MEAS - ACS: 1.26 CM
BH CV ECHO MEAS - AO MAX PG: 34.1 MMHG
BH CV ECHO MEAS - AO MEAN PG: 19.3 MMHG
BH CV ECHO MEAS - AO ROOT DIAM: 3.3 CM
BH CV ECHO MEAS - AO V2 MAX: 291.8 CM/SEC
BH CV ECHO MEAS - AO V2 VTI: 62.2 CM
BH CV ECHO MEAS - AVA(I,D): 0.89 CM2
BH CV ECHO MEAS - EDV(CUBED): 38.2 ML
BH CV ECHO MEAS - EDV(MOD-SP2): 61 ML
BH CV ECHO MEAS - EDV(MOD-SP4): 57 ML
BH CV ECHO MEAS - EF(MOD-BP): 64.6 %
BH CV ECHO MEAS - EF(MOD-SP2): 67.2 %
BH CV ECHO MEAS - EF(MOD-SP4): 61.4 %
BH CV ECHO MEAS - ESV(CUBED): 11.7 ML
BH CV ECHO MEAS - ESV(MOD-SP2): 20 ML
BH CV ECHO MEAS - ESV(MOD-SP4): 22 ML
BH CV ECHO MEAS - FS: 32.6 %
BH CV ECHO MEAS - IVS/LVPW: 0.97 CM
BH CV ECHO MEAS - IVSD: 1.07 CM
BH CV ECHO MEAS - LAT PEAK E' VEL: 7.3 CM/SEC
BH CV ECHO MEAS - LV DIASTOLIC VOL/BSA (35-75): 35.4 CM2
BH CV ECHO MEAS - LV MASS(C)D: 110.8 GRAMS
BH CV ECHO MEAS - LV MAX PG: 3.8 MMHG
BH CV ECHO MEAS - LV MEAN PG: 2.09 MMHG
BH CV ECHO MEAS - LV SYSTOLIC VOL/BSA (12-30): 13.7 CM2
BH CV ECHO MEAS - LV V1 MAX: 97.7 CM/SEC
BH CV ECHO MEAS - LV V1 VTI: 20.4 CM
BH CV ECHO MEAS - LVIDD: 3.4 CM
BH CV ECHO MEAS - LVIDS: 2.27 CM
BH CV ECHO MEAS - LVOT AREA: 2.7 CM2
BH CV ECHO MEAS - LVOT DIAM: 1.85 CM
BH CV ECHO MEAS - LVPWD: 1.1 CM
BH CV ECHO MEAS - MED PEAK E' VEL: 5.7 CM/SEC
BH CV ECHO MEAS - MV A DUR: 0.15 SEC
BH CV ECHO MEAS - MV A MAX VEL: 107.5 CM/SEC
BH CV ECHO MEAS - MV DEC SLOPE: 228.5 CM/SEC2
BH CV ECHO MEAS - MV DEC TIME: 293 MSEC
BH CV ECHO MEAS - MV E MAX VEL: 59.1 CM/SEC
BH CV ECHO MEAS - MV E/A: 0.55
BH CV ECHO MEAS - MV MAX PG: 4.5 MMHG
BH CV ECHO MEAS - MV MEAN PG: 1.75 MMHG
BH CV ECHO MEAS - MV P1/2T: 86.6 MSEC
BH CV ECHO MEAS - MV V2 VTI: 23.2 CM
BH CV ECHO MEAS - MVA(P1/2T): 2.5 CM2
BH CV ECHO MEAS - MVA(VTI): 2.38 CM2
BH CV ECHO MEAS - PA ACC TIME: 0.11 SEC
BH CV ECHO MEAS - PA PR(ACCEL): 27.9 MMHG
BH CV ECHO MEAS - PA V2 MAX: 65.8 CM/SEC
BH CV ECHO MEAS - PULM A REVS DUR: 0.14 SEC
BH CV ECHO MEAS - PULM A REVS VEL: 36.4 CM/SEC
BH CV ECHO MEAS - PULM DIAS VEL: 25.7 CM/SEC
BH CV ECHO MEAS - PULM S/D: 2.03
BH CV ECHO MEAS - PULM SYS VEL: 52.3 CM/SEC
BH CV ECHO MEAS - RAP SYSTOLE: 3 MMHG
BH CV ECHO MEAS - RV MAX PG: 1.7 MMHG
BH CV ECHO MEAS - RV V1 MAX: 65.1 CM/SEC
BH CV ECHO MEAS - RV V1 VTI: 10.3 CM
BH CV ECHO MEAS - RVSP: 18.3 MMHG
BH CV ECHO MEAS - SI(MOD-SP2): 25.5 ML/M2
BH CV ECHO MEAS - SI(MOD-SP4): 21.8 ML/M2
BH CV ECHO MEAS - SV(LVOT): 55.1 ML
BH CV ECHO MEAS - SV(MOD-SP2): 41 ML
BH CV ECHO MEAS - SV(MOD-SP4): 35 ML
BH CV ECHO MEAS - TAPSE (>1.6): 2.8 CM
BH CV ECHO MEAS - TR MAX PG: 15.3 MMHG
BH CV ECHO MEAS - TR MAX VEL: 195.5 CM/SEC
BH CV ECHO MEASUREMENTS AVERAGE E/E' RATIO: 9.09
BH CV VAS BP RIGHT ARM: NORMAL MMHG
BH CV XLRA - RV BASE: 3.3 CM
BH CV XLRA - RV LENGTH: 7.4 CM
BH CV XLRA - RV MID: 3 CM
BH CV XLRA - TDI S': 20 CM/SEC
BILIRUB SERPL-MCNC: 0.4 MG/DL (ref 0–1.2)
BUN SERPL-MCNC: 26 MG/DL (ref 8–23)
BUN/CREAT SERPL: 23 (ref 7–25)
CALCIUM SPEC-SCNC: 7.8 MG/DL (ref 8.2–9.6)
CHLORIDE SERPL-SCNC: 97 MMOL/L (ref 98–107)
CO2 SERPL-SCNC: 24.4 MMOL/L (ref 22–29)
CREAT SERPL-MCNC: 1.13 MG/DL (ref 0.57–1)
DEPRECATED RDW RBC AUTO: 41.3 FL (ref 37–54)
EGFRCR SERPLBLD CKD-EPI 2021: 44.9 ML/MIN/1.73
EOSINOPHIL # BLD AUTO: 0.35 10*3/MM3 (ref 0–0.4)
EOSINOPHIL NFR BLD AUTO: 2.1 % (ref 0.3–6.2)
ERYTHROCYTE [DISTWIDTH] IN BLOOD BY AUTOMATED COUNT: 12.8 % (ref 12.3–15.4)
GLOBULIN UR ELPH-MCNC: 2.5 GM/DL
GLUCOSE SERPL-MCNC: 115 MG/DL (ref 65–99)
HCT VFR BLD AUTO: 29 % (ref 34–46.6)
HGB BLD-MCNC: 10 G/DL (ref 12–15.9)
IMM GRANULOCYTES # BLD AUTO: 0.36 10*3/MM3 (ref 0–0.05)
IMM GRANULOCYTES NFR BLD AUTO: 2.2 % (ref 0–0.5)
LEFT ATRIUM VOLUME INDEX: 29.5 ML/M2
LYMPHOCYTES # BLD AUTO: 2.74 10*3/MM3 (ref 0.7–3.1)
LYMPHOCYTES NFR BLD AUTO: 16.7 % (ref 19.6–45.3)
MAXIMAL PREDICTED HEART RATE: 125 BPM
MAXIMAL PREDICTED HEART RATE: 125 BPM
MCH RBC QN AUTO: 30.3 PG (ref 26.6–33)
MCHC RBC AUTO-ENTMCNC: 34.5 G/DL (ref 31.5–35.7)
MCV RBC AUTO: 87.9 FL (ref 79–97)
MONOCYTES # BLD AUTO: 1.67 10*3/MM3 (ref 0.1–0.9)
MONOCYTES NFR BLD AUTO: 10.2 % (ref 5–12)
NEUTROPHILS NFR BLD AUTO: 11.22 10*3/MM3 (ref 1.7–7)
NEUTROPHILS NFR BLD AUTO: 68.6 % (ref 42.7–76)
NRBC BLD AUTO-RTO: 0.1 /100 WBC (ref 0–0.2)
PLATELET # BLD AUTO: 224 10*3/MM3 (ref 140–450)
PMV BLD AUTO: 9.5 FL (ref 6–12)
POTASSIUM SERPL-SCNC: 4.5 MMOL/L (ref 3.5–5.2)
PROT SERPL-MCNC: 5.1 G/DL (ref 6–8.5)
RBC # BLD AUTO: 3.3 10*6/MM3 (ref 3.77–5.28)
SARS-COV-2 RNA RESP QL NAA+PROBE: NOT DETECTED
SINUS: 3.2 CM
SODIUM SERPL-SCNC: 129 MMOL/L (ref 136–145)
STRESS TARGET HR: 106 BPM
STRESS TARGET HR: 106 BPM
TROPONIN T SERPL HS-MCNC: 28 NG/L
WBC NRBC COR # BLD: 16.37 10*3/MM3 (ref 3.4–10.8)

## 2023-04-20 PROCEDURE — 84484 ASSAY OF TROPONIN QUANT: CPT

## 2023-04-20 PROCEDURE — 97162 PT EVAL MOD COMPLEX 30 MIN: CPT

## 2023-04-20 PROCEDURE — 99222 1ST HOSP IP/OBS MODERATE 55: CPT | Performed by: INTERNAL MEDICINE

## 2023-04-20 PROCEDURE — 93306 TTE W/DOPPLER COMPLETE: CPT

## 2023-04-20 PROCEDURE — G0378 HOSPITAL OBSERVATION PER HR: HCPCS

## 2023-04-20 PROCEDURE — 85025 COMPLETE CBC W/AUTO DIFF WBC: CPT | Performed by: NURSE PRACTITIONER

## 2023-04-20 PROCEDURE — U0003 INFECTIOUS AGENT DETECTION BY NUCLEIC ACID (DNA OR RNA); SEVERE ACUTE RESPIRATORY SYNDROME CORONAVIRUS 2 (SARS-COV-2) (CORONAVIRUS DISEASE [COVID-19]), AMPLIFIED PROBE TECHNIQUE, MAKING USE OF HIGH THROUGHPUT TECHNOLOGIES AS DESCRIBED BY CMS-2020-01-R: HCPCS | Performed by: NURSE PRACTITIONER

## 2023-04-20 PROCEDURE — U0005 INFEC AGEN DETEC AMPLI PROBE: HCPCS | Performed by: NURSE PRACTITIONER

## 2023-04-20 PROCEDURE — 93306 TTE W/DOPPLER COMPLETE: CPT | Performed by: INTERNAL MEDICINE

## 2023-04-20 PROCEDURE — 97530 THERAPEUTIC ACTIVITIES: CPT

## 2023-04-20 PROCEDURE — 80053 COMPREHEN METABOLIC PANEL: CPT | Performed by: NURSE PRACTITIONER

## 2023-04-20 PROCEDURE — 93246 EXT ECG>7D<15D RECORDING: CPT

## 2023-04-20 RX ADMIN — Medication 10 ML: at 08:46

## 2023-04-20 RX ADMIN — FERROUS SULFATE TAB 325 MG (65 MG ELEMENTAL FE) 325 MG: 325 (65 FE) TAB at 08:46

## 2023-04-20 NOTE — CASE MANAGEMENT/SOCIAL WORK
Discharge Planning Assessment  [facility]     Patient Name: Shaina Mcknight  MRN: [id number]  Today's Date: 4/20/2023    Admit Date: 4/19/2023    Plan: Update-Plans to return home at d/Ncici Patel RN   Discharge Needs Assessment    No documentation.                Discharge Plan     Row Name 04/20/23 1641       Plan    Plan Comments Call received from Kristi oliveros/[facility] who advised [facility] is unable to accept at this time- call placed to POA listed to advise that [facility] is unable to accept- requested other preferences to refer for home health- POA agreed to broad referrals- Call placed to Lia oliveros/[facility] for referral request. Referral placed in EPIC    Final Discharge Disposition Code 06 - home with home health care              Continued Care and Services - Admitted Since 4/19/2023     Home Medical Care     Service Provider Request Status Selected Services Address Phone Fax Patient Preferred    [facility] Pending - Request Sent N/A [id number] [address]  [facility] [address] [address] [phone] --     [facility] Declined  Lack of PDGM dx N/A [id number] [facility] [address] [id number] [phone] --              Expected Discharge Date and Time     Expected Discharge Date Expected Discharge Time    Apr 20, 2023          Demographic Summary    No documentation.                Functional Status    No documentation.                Psychosocial    No documentation.                Abuse/Neglect    No documentation.                Legal    No documentation.                Substance Abuse    No documentation.                Patient Forms    No documentation.                   Brittanie Adorno RN

## 2023-04-20 NOTE — CONSULTS
Cardiology History & Physical / Consultation      Patient Name: Shaina Mcknight  Age/Sex: 89 y.o. female  : 10/4/1927  MRN: [id number]    Date of Admission: 2023  Date of Encounter Visit: 23  Encounter Provider: Adolph Keene MD  Referring Provider: Shaq Bueno MD  Place of Service: [facility] CARDIOLOGY  Patient Care Team:  Fanta Shankar MD as PCP - General  Fanta Shankar MD as PCP - Family Medicine  Braulio Sweet III, MD as Consulting Physician (Cardiology)  José Antonio Cm MD as Consulting Physician (Hematology and Oncology)  Fanta Shankar MD as Referring Physician (Internal Medicine)          Subjective:     Chief Complaint: syncope    Reason for consultation:syncope    History of Present Illness:  Shaina Mcknight is a 89 y.o. female pt of Dr. Sweet with a history of aortic stenosis, TB, RSV with pulmonary fibrosis, DVT on chronic home oxygen, and Zenker's diverticulum s/p surgical intervention.       A stress ECHO in  was normal. In 2017, she followed up and was doing well but still had some SPENCER. She was following pulmonary.     A repeat ECHO in , showed mild to moderate aortic stenosis, mild mitral stenosis, normal left lower function.  Normal pulmonary arterial pressure.  Pt was admitted in 2018 with severe RSV pneumonia     Pt was seen in follow up in the office by Dr. Sweet on 22. Pt denied any chest pain, palpitations or tachycardia. No changes were made.     Pt presented to ER on 23 from [facility] with multiple syncopal episodes.  Patient was in charge she had given confession and she was sitting in a chair when suddenly she slumped over.  Her daughter was with her at the time and witnessed the event.  Patient daughter said she was out for about 5 to 10 minutes.  Someone tried to take her pulse and said it was very weak if existent at all.  EMS subsequently arrived and her having issues hooking her up.   She then subsequently passed out again.  Patient then reportedly had another episode of syncope while in the ambulance on the way to the hospital.  After the first episode her daughter said her speech was a little bit soft and slightly hard to understand.  She said she does this from time to time.  Patient's daughter and EMS document that there was no seizure-like activity.  When patient got to the ER and her family caught up with her she was completely back to normal.  She denied any hypoxia, HTN, falls, head injuries, fevers, chills, cough, chest pain, shortness of breath, abd pain, vomiting or diarrhea. She also denied any dysuria or urinary urgency or frequency. In ER, BUN/CR 35/1.34, , troponin T 28, WBC 24.52, HGB 11.0, procal 0.08, Lactate 1.7, UA negative for nitrates, urine negative for bacteria, CT of head showed nothing acute, CXR showed  Lungs are hypoinflated. Mild bibasilar pulmonary opacification is present, left greater then right, grossly similar that seen on 04/19/2022. No pneumothorax or pleural effusion is seen. No new pulmonary consolidation is seen. Cardiac silhouette is accentuated by low lung volumes. The trachea is deviated to the right, as before. EKG showed SR 84, isolated T wave inversion in lead III, no acute ischemic changes,         ECHO 3/29/22  • Left ventricular ejection fraction appears to be 66 - 70%. Left ventricular systolic function is normal. Normal left ventricular cavity size and wall thickness noted. All left ventricular wall segments contract normally. Left ventricular diastolic function is consistent with (grade I) impaired relaxation. No evidence of left ventricular thrombus or mass present.  • There is mild calcification of the aortic valve. No significant aortic valve regurgitation is present. Mild to moderate aortic valve stenosis is present  Stress ECHO 11/9/16  • Normal stress echo with no significant echocardiographic evidence for myocardial ischemia.  • Mild  aortic valve stenosis is present.       Past Medical History:  Past Medical History:   Diagnosis Date   • Acute pain of left shoulder due to trauma    • Anemia 1950   • Aortic valve regurgitation     trace   • Aortic valve stenosis     mild   • Rojas esophagus 2018   • Bilateral carotid artery stenosis    • Cancer 1990    some skin cancers   • SPENCER (dyspnea on exertion)    • Esophageal varices 2018   • Fall     going up the steps   • GERD (gastroesophageal reflux disease) 2018   • Heart murmur    • Mitral valve regurgitation     mild to moderate   • Pulmonary valve regurgitation     trace   • Syncope 4/19/2023   • Tricuspid valve regurgitation     mild to moderate       Past Surgical History:   Procedure Laterality Date   • CATARACT EXTRACTION Bilateral    • HYSTERECTOMY     • TONSILLECTOMY AND ADENOIDECTOMY     • ZENKERS DIVERTICULECTOMY N/A 3/31/2022    Procedure: ZENKERS DIVERTICULECTOMY WITH MYOTOMY, ESOPHAGOSCOPY;  Surgeon: Chilango Louis III, MD;  Location: [facility] OR;  Service: Thoracic;  Laterality: N/A;       Home Medications:   Medications Prior to Admission   Medication Sig Dispense Refill Last Dose   • B Complex Vitamins (vitamin b complex) capsule capsule Take 1 capsule by mouth Daily.   4/19/2023   • Calcium Carbonate 1500 (600 Ca) MG tablet Take 1 tablet by mouth Daily.   4/19/2023   • ferrous sulfate 325 (65 FE) MG tablet Take 1 tablet by mouth Daily With Breakfast.   4/19/2023   • Misc Natural Products (OSTEO BI-FLEX ADV TRIPLE ST PO) Take 1 tablet by mouth Daily.   4/19/2023   • MULTIPLE VITAMIN PO Take 1 tablet by mouth Daily.   4/19/2023   • Vitamin D, Cholecalciferol, 1000 units capsule Take 1 capsule by mouth Daily.   4/19/2023   • pantoprazole (PROTONIX) 40 MG EC tablet Take 1 tablet by mouth Daily.      • Rivaroxaban (Xarelto Starter Pack) tablet therapy pack starter pack Take one 15 mg tablet twice daily with food for 21 days.  Followed by one 20 mg tablet by mouth once daily with  food. Take as directed 51 each 0        Allergies:  Allergies   Allergen Reactions   • Molds & Smuts Shortness Of Breath   • Penicillins Hives   • Tessalon [Benzonatate] Delirium   • Oyster Shell Diarrhea       Past Social History:  Social History     Socioeconomic History   • Marital status:    Tobacco Use   • Smoking status: Never   • Smokeless tobacco: Never   • Tobacco comments:     caffine use   Vaping Use   • Vaping Use: Never used   Substance and Sexual Activity   • Alcohol use: Yes     Alcohol/week: 5.0 standard drinks     Types: 5 Glasses of wine per week     Comment: daily  4 ounce wine   • Drug use: No   • Sexual activity: Defer       Past Family History: History reviewed. No pertinent family history.   Family History   Problem Relation Age of Onset   • Cancer Mother         bladder   • Heart attack Father    • Heart disease Father    • Sudden death Father    • Leukemia Father    • Heart attack Brother    • Heart disease Brother    • Sudden death Brother    • Cancer Son        Review of Systems        Objective:     Objective:  Temp:  [97.9 °F (36.6 °C)-99.7 °F (37.6 °C)] 98.9 °F (37.2 °C)  Heart Rate:  [79-96] 87  Resp:  [16-24] 18  BP: (100-152)/(53-75) 141/58    Intake/Output Summary (Last 24 hours) at 4/20/2023 0906  Last data filed at 4/19/2023 1937  Gross per 24 hour   Intake 1000 ml   Output 600 ml   Net 400 ml     Body mass index is 27.73 kg/m².      04/19/23  1221   Weight: 64.4 kg (141 lb 15.6 oz)           Physical Exam:   Vitals reviewed.   Constitutional:       Appearance: Healthy appearance.   Pulmonary:      Effort: Pulmonary effort is normal.   Cardiovascular:      Normal rate. Regular rhythm. Normal S1. Normal S2.      Murmurs: There is a grade 3/6 harsh midsystolic murmur at the URSB.      No gallop. No click. No rub.   Pulses:     Intact distal pulses.   Edema:     Peripheral edema absent.   Neurological:      Mental Status: Alert and oriented to person, place and time.           Labs:   Lab Review:     Results from last 7 days   Lab Units 04/20/23  0341 04/19/23  1224   SODIUM mmol/L 129* 135*   POTASSIUM mmol/L 4.5 4.5   CHLORIDE mmol/L 97* 95*   CO2 mmol/L 24.4 31.2*   BUN mg/dL 26* 35*   CREATININE mg/dL 1.13* 1.34*   GLUCOSE mg/dL 115* 111*   CALCIUM mg/dL 7.8* 9.0   AST (SGOT) U/L 16 18   ALT (SGPT) U/L 14 17     Results from last 7 days   Lab Units 04/20/23  0341 04/19/23  1559 04/19/23  1224   CK TOTAL U/L  --   --  38   HSTROP T ng/L 28* 25* 28*     Results from last 7 days   Lab Units 04/20/23  0341   WBC 10*3/mm3 16.37*   HEMOGLOBIN g/dL 10.0*   HEMATOCRIT % 29.0*   PLATELETS 10*3/mm3 224             Results from last 7 days   Lab Units 04/19/23  1224   MAGNESIUM mg/dL 1.9                         PREVIOUS EKG:          EKG:             Assessment:       Syncope        Plan:     1.  Recurrent syncopal episodes.  Her last echo was about a year ago her murmur is quite impressive so I would repeat her echocardiogram.  Patient also was on prednisone for swelling of her right hand.  She was on 2 tablets a day presumed to 20 mg a day for a week.  There was no taper and it subsequently was discontinued on Monday of this week.  At her age I think this is a very large suspect of an etiology for her syncopal episodes.  I am going to get an echo if unremarkable I would send the patient home with a monitor and have her follow-up with Dr. Sweet or his nurse practitioner in the next 2 to 4 weeks.    Thank you for allowing me to participate in the care of Shaina Mcknight. Feel free to contact me directly with any further questions or concerns.    Adolph Keene MD  [facility]  04/20/23  09:06 EDT

## 2023-04-20 NOTE — PLAN OF CARE
Goal Outcome Evaluation:  Plan of Care Reviewed With: patient           Outcome Evaluation: Pt is 89 yo female seen in obs unit after syncopal episodes. PMH significant for HTN, cancer, AVR. Patient lives with son, independent without use of AD prior to admission. Today, patient awake and alert, family in room, agreeable to therapy. Patient performed bed mobility with CGA, sit to stand with CGA, ambulated all around nurses unit with rwx and CGA. Pt demonstrates impairments consisting of generalized weakness, decreased activity tolerance and may benefit from skilled PT. Patient safe to return home at current functional level, recommend use of rwx and f/u with HHPT.

## 2023-04-20 NOTE — PROGRESS NOTES
MD ATTESTATION NOTE    The JUANITA and I have discussed this patient's history, physical exam, and treatment plan.  I have reviewed the documentation and personally had a face to face interaction with the patient. I affirm the documentation and agree with the treatment and plan.  The attached note describes my personal findings.      I provided a substantive portion of the care of the patient.  I personally performed the physical exam in its entirety, and below are my findings.  For this patient encounter, the patient wore surgical mask, I wore full protective PPE including N95 and eye protection.      Brief HPI: This patient is a 89-year-old female admitted to the observation unit today following at least 2-3 syncopal episodes per the patient's family.  Currently, the patient reports fatigue but denies headache or head injury, chest pain, shortness of breath, or nausea and vomiting.  During the work-up, she was found at one point to have a heart rate in the 30s.  Currently, that has improved.    PHYSICAL EXAM  ED Triage Vitals   Temp Heart Rate Resp BP SpO2   04/19/23 1221 04/19/23 1216 04/19/23 1216 04/19/23 1216 04/19/23 1216   97.9 °F (36.6 °C) 83 16 126/67 98 %      Temp src Heart Rate Source Patient Position BP Location FiO2 (%)   04/19/23 1221 04/19/23 1221 04/19/23 1601 04/19/23 1601 --   Oral Monitor Lying Right arm            GENERAL: Resting comfortably in no acute distress, nontoxic in appearance  HENT: nares patent  EYES: no scleral icterus  CV: regular rhythm, normal rate, no M/R/G  RESPIRATORY: normal effort, lungs clear bilaterally  ABDOMEN: soft, nontender, no rebound or guarding  MUSCULOSKELETAL: no deformity, no edema  NEURO: alert, moves all extremities, follows commands  PSYCH:  calm, cooperative  SKIN: warm, dry    Vital signs and nursing notes reviewed.        Plan: We will monitor the patient throughout the evening and ask cardiology to see in morning consultation.  We will reassess  following.

## 2023-04-20 NOTE — PLAN OF CARE
Goal Outcome Evaluation:              Outcome Evaluation: Pt discharged to home.  Pt and family verbalized understanding of all discharge orders. Zio patch on patient.  Pt and family agreeable to all discharge plans.

## 2023-04-20 NOTE — DISCHARGE PLACEMENT REQUEST
"Shaina Mcknight (89 y.o. Female)     Date of Birth   10/04/1927    Social Security Number       Address   [address] [place] [address]    Home Phone   [phone]    MRN   [id number]       Mormonism   Yazdanism    Marital Status                               Admission Date   4/19/23    Admission Type   Emergency    Admitting Provider   Shaq Bueno MD    Attending Provider   Shaq Bueno MD    Department, Room/Bed   [facility] OBSERVATION, 110/1       Discharge Date       Discharge Disposition   Home or Self Care    Discharge Destination                               Attending Provider: Shaq Bueno MD    Allergies: Molds & Smuts, Penicillins, Tessalon [Benzonatate], Oyster Shell    Isolation: None   Infection: None   Code Status: No CPR    Ht: 152.4 cm (60\")   Wt: 64 kg (141 lb)    Admission Cmt: None   Principal Problem: Syncope [R55]                 Active Insurance as of 4/19/2023     Primary Coverage     Payor Plan Insurance Group Employer/Plan Group    MEDICARE MEDICARE A & B      Payor Plan Address Payor Plan Phone Number Payor Plan Fax Number Effective Dates    [address] [phone]  10/1/1992 - None Entered    [facility] [id number]       Subscriber Name Subscriber Birth Date Member ID       SHAINA MCKNIGHT 10/4/1927 [id number]           Secondary Coverage     Payor Plan Insurance Group Employer/Plan Group    AARP  SUP AARP HEALTH CARE OPTIONS      Payor Plan Address Payor Plan Phone Number Payor Plan Fax Number Effective Dates    [facility] [phone]  1/1/2016 - None Entered    [address]       [facility] [id number]       Subscriber Name Subscriber Birth Date Member ID       SHAINA MCKNIGHT 10/4/1927 [id number]                 Emergency Contacts      (Rel.) Home Phone Work Phone Mobile Phone    Kathleen Carpenter (AZIZA) (Power of ) [phone] -- --    PHILLIP FELIZ (Daughter) [phone] -- " [phone]    KIMMIE OSMAN -- -- [phone]

## 2023-04-20 NOTE — DISCHARGE PLACEMENT REQUEST
"Shaina Mcknight (89 y.o. Female)     Date of Birth   10/04/1927    Social Security Number       Address   [address] [place] [address]    Home Phone   [phone]    MRN   [id number]       Amish   Orthodoxy    Marital Status                               Admission Date   4/19/23    Admission Type   Emergency    Admitting Provider   Shaq Bueno MD    Attending Provider   Shaq Bueno MD    Department, Room/Bed   [facility] OBSERVATION, 110/1       Discharge Date       Discharge Disposition       Discharge Destination                               Attending Provider: Shaq Bueno MD    Allergies: Molds & Smuts, Penicillins, Tessalon [Benzonatate], Oyster Shell    Isolation: None   Infection: None   Code Status: No CPR    Ht: 152.4 cm (60\")   Wt: 64.4 kg (141 lb 15.6 oz)    Admission Cmt: None   Principal Problem: Syncope [R55]                 Active Insurance as of 4/19/2023     Primary Coverage     Payor Plan Insurance Group Employer/Plan Group    MEDICARE MEDICARE A & B      Payor Plan Address Payor Plan Phone Number Payor Plan Fax Number Effective Dates    [address] [phone]  10/1/1992 - None Entered    [facility] [id number]       Subscriber Name Subscriber Birth Date Member ID       SHAINA MCKNIGHT 10/4/1927 [id number]           Secondary Coverage     Payor Plan Insurance Group Employer/Plan Group    AARP [facility] SUP AARP HEALTH CARE OPTIONS      Payor Plan Address Payor Plan Phone Number Payor Plan Fax Number Effective Dates    [facility] [phone]  1/1/2016 - None Entered    [address]       [facility] [address]       Subscriber Name Subscriber Birth Date Member ID       SHAINA MCKNIGHT 10/4/1927 [id number]                 Emergency Contacts      (Rel.) Home Phone Work Phone Mobile Phone    Kathleen Carpenter (DAMALINA) (Power of ) [phone] -- --    PHILLIP FELIZ (Daughter) [phone] -- [phone]    " KIMMIE OSMAN -- -- [phone]

## 2023-04-22 LAB
BACTERIA BLD CULT: NORMAL
BACTERIA SPEC AEROBE CULT: ABNORMAL
BOTTLE TYPE: NORMAL
GRAM STN SPEC: ABNORMAL
ISOLATED FROM: ABNORMAL

## 2023-04-24 ENCOUNTER — TELEPHONE (OUTPATIENT)
Dept: CARDIOLOGY | Facility: CLINIC | Age: 88
End: 2023-04-24

## 2023-04-24 LAB — BACTERIA SPEC AEROBE CULT: NORMAL

## 2023-04-24 NOTE — TELEPHONE ENCOUNTER
Caller: Kathleen Carpenter ([facility])    Relationship to patient: Emergency Contact    Best call back number: [phone]    Chief complaint: HOSPITAL FOLLOW UP FOR SYNCOPE, ADULT IN 2 TO 4 WEEKS FROM HOSPITAL DISCHARGE ON 4/20/23.  PATIENT'S DAUGHTER KATHLEEN WOULD LIKE PATIENT TO HAVE HOSPITAL FOLLOW UP WITH DR WOLF IF POSSIBLE.  PATIENT IS CURRENTLY SCHEDULED WITH LANDON GENAO.     Type of visit: HOSPITAL FOLLOW UP    Requested date: PATIENT'S DAUGHTER WANTED CLOSER TO 4 WEEKS SO HEART MONITOR RESULTS WOULD BE IN CHART FOR DR WOLF.     If rescheduling, when is the original appointment: 5/18/23 10:30AM    Additional notes: NONE.

## 2023-04-25 NOTE — TELEPHONE ENCOUNTER
Called pt daughter , to schedule a hospital follow up with Dr. Sweet. Tried to leave  but  wasn't available .    Jenny Melo MA  4/25/23 11:38A

## 2023-04-25 NOTE — TELEPHONE ENCOUNTER
Spoke with pt daughter, canceled appt with SONDRA Gilbert 5/18/23 scheduled hospital follow up with Dr. Sweet for 5/23/23 at 11:30A. Verbalized understanding. No further questions.    Jenny Melo MA  4/25/23 3:22P

## 2023-05-12 LAB
MAXIMAL PREDICTED HEART RATE: 125 BPM
STRESS TARGET HR: 106 BPM

## 2023-05-18 LAB
MAXIMAL PREDICTED HEART RATE: 125 BPM
STRESS TARGET HR: 106 BPM

## 2023-05-23 ENCOUNTER — OFFICE VISIT (OUTPATIENT)
Dept: CARDIOLOGY | Facility: CLINIC | Age: 88
End: 2023-05-23
Payer: MEDICARE

## 2023-05-23 VITALS
DIASTOLIC BLOOD PRESSURE: 82 MMHG | OXYGEN SATURATION: 99 % | BODY MASS INDEX: 26.9 KG/M2 | SYSTOLIC BLOOD PRESSURE: 110 MMHG | HEIGHT: 60 IN | WEIGHT: 137 LBS | HEART RATE: 71 BPM

## 2023-05-23 DIAGNOSIS — Z09 HOSPITAL DISCHARGE FOLLOW-UP: Primary | ICD-10-CM

## 2023-05-23 DIAGNOSIS — I35.0 NONRHEUMATIC AORTIC VALVE STENOSIS: Chronic | ICD-10-CM

## 2023-05-23 DIAGNOSIS — I47.1 PAROXYSMAL SVT (SUPRAVENTRICULAR TACHYCARDIA): ICD-10-CM

## 2023-05-23 PROBLEM — I47.10 PAROXYSMAL SVT (SUPRAVENTRICULAR TACHYCARDIA): Status: ACTIVE | Noted: 2023-05-23

## 2023-05-23 RX ORDER — ASPIRIN 81 MG/1
81 TABLET ORAL DAILY
COMMUNITY

## 2023-05-23 RX ORDER — FLUTICASONE PROPIONATE AND SALMETEROL 250; 50 UG/1; UG/1
POWDER RESPIRATORY (INHALATION)
COMMUNITY

## 2023-05-23 RX ORDER — ALBUTEROL SULFATE 2.5 MG/3ML
2.5 SOLUTION RESPIRATORY (INHALATION) 2 TIMES DAILY
COMMUNITY

## 2023-05-23 NOTE — PROGRESS NOTES
Subjective:     Encounter Date: 05/23/23        Patient ID: Shaina Mcknight is a 89 y.o. female.    Chief Complaint:  Shortness of Breath  Pertinent negatives include no ear pain, fever, hemoptysis, neck pain, rash, sore throat or sputum production.       Dear Dr. Shankar,    I had the pleasure seeing this patient in the office today for follow-up of her cardiac status.  She has a history of aortic stenosis.  She continues to have baseline dyspnea which she has had for years.  She has a history of prior TB, prior RSV with pulmonary fibrosis, history of DVT.  She is on chronic oxygen therapy.    Patient was recently in the hospital after episodes of syncope.  Syncope was felt to be secondary to abrupt cessation of steroid therapy.  Evaluation in the observation unit was unremarkable.  Patient then wore a Holter monitor which showed recurrent episodes of SVT but only 3 and 4 beats in duration.  Nothing sustained.  No symptoms.    She never had any episodes of syncope before, has not had any episodes of syncope since she was hospitalized that day.    I saw her back in 2017.  She had a stress echocardiogram performed in 2016 that showed normal stress echocardiogram with no ischemia but mild aortic valve stenosis.  We followed up in 2017 and she was doing well but was still having dyspnea on exertion.  She has been following with pulmonary.  She does have a history of TB.  She then had a repeat echocardiogram performed 2018 that showed mild to moderate aortic stenosis, mild mitral stenosis, normal left lower function.  Normal pulmonary arterial pressure.    She was hospitalized with severe RSV pneumonia in November 2018.  At one point during that time she was not felt that she would survive.   She is on nocturnal oxygen.      The following portions of the patient's history were reviewed and updated as appropriate: allergies, current medications, past family history, past medical history, past social history, past  "surgical history and problem list.    Past Medical History:   Diagnosis Date   • Acute pain of left shoulder due to trauma    • Anemia 1950   • Aortic valve regurgitation     trace   • Aortic valve stenosis     mild   • Rojas esophagus 2018   • Bilateral carotid artery stenosis    • Cancer 1990    some skin cancers   • SPENCER (dyspnea on exertion)    • Esophageal varices 2018   • Fall     going up the steps   • GERD (gastroesophageal reflux disease) 2018   • Heart murmur    • Mitral valve regurgitation     mild to moderate   • Pulmonary valve regurgitation     trace   • Syncope 4/19/2023   • Tricuspid valve regurgitation     mild to moderate       Past Surgical History:   Procedure Laterality Date   • CATARACT EXTRACTION Bilateral    • HYSTERECTOMY     • TONSILLECTOMY AND ADENOIDECTOMY     • ZENKERS DIVERTICULECTOMY N/A 3/31/2022    Procedure: ZENKERS DIVERTICULECTOMY WITH MYOTOMY, ESOPHAGOSCOPY;  Surgeon: Chilango Louis III, MD;  Location: [facility];  Service: Thoracic;  Laterality: N/A;           Procedures       Objective:     Vitals:    05/23/23 1134   BP: 110/82   BP Location: Left arm   Patient Position: Sitting   Pulse: 71   SpO2: 99%   Weight: 62.1 kg (137 lb)   Height: 152.4 cm (60\")         Physical Exam  Constitutional:       General: She is not in acute distress.     Appearance: She is well-developed. She is not diaphoretic.   HENT:      Head: Normocephalic and atraumatic.      Nose: Nose normal.   Eyes:      General:         Right eye: No discharge.         Left eye: No discharge.      Conjunctiva/sclera: Conjunctivae normal.      Pupils: Pupils are equal, round, and reactive to light.   Neck:      Thyroid: No thyromegaly.      Trachea: No tracheal deviation.   Cardiovascular:      Rate and Rhythm: Normal rate and regular rhythm.      Pulses: Normal pulses.      Heart sounds: S1 normal and S2 normal. Murmur heard.    Harsh midsystolic murmur is present with a grade of 1/6 at the upper right " sternal border radiating to the neck.    No S3 sounds.   Pulmonary:      Effort: Pulmonary effort is normal. No respiratory distress.      Breath sounds: Normal breath sounds. No stridor.   Chest:      Chest wall: No tenderness.   Abdominal:      General: Bowel sounds are normal. There is no distension.      Palpations: Abdomen is soft. There is no mass.      Tenderness: There is no abdominal tenderness. There is no guarding or rebound.   Musculoskeletal:         General: No tenderness or deformity. Normal range of motion.      Cervical back: Normal range of motion and neck supple.   Lymphadenopathy:      Cervical: No cervical adenopathy.   Skin:     General: Skin is warm and dry.      Findings: No erythema or rash.   Neurological:      Mental Status: She is alert and oriented to person, place, and time.      Deep Tendon Reflexes: Reflexes are normal and symmetric.   Psychiatric:         Thought Content: Thought content normal.         Lab Review:             Lab Results   Component Value Date    GLUCOSE 115 (H) 04/20/2023    BUN 26 (H) 04/20/2023    CREATININE 1.13 (H) 04/20/2023    EGFRIFNONA 35 (L) 10/01/2021    BCR 23.0 04/20/2023    K 4.5 04/20/2023    CO2 24.4 04/20/2023    CALCIUM 7.8 (L) 04/20/2023    PROTENTOTREF 7.1 06/15/2022    ALBUMIN 2.6 (L) 04/20/2023    LABIL2 0.8 06/15/2022    AST 16 04/20/2023    ALT 14 04/20/2023       Results for orders placed during the hospital encounter of 04/19/23    Adult Transthoracic Echo Complete W/ Cont if Necessary Per Protocol    Interpretation Summary  •  Left ventricular systolic function is normal. Calculated left ventricular EF = 64.6%  •  Left ventricular diastolic function is consistent with age.  •  Mild to moderate aortic valve stenosis is present. Aortic valve area is 0.9 cm2.  •  Peak velocity of the flow distal to the aortic valve is 291.8 cm/s. Aortic valve maximum pressure gradient is 34 mmHg. Aortic valve mean pressure gradient is 19 mmHg. Aortic valve  dimensionless index is 0.3 .  •  Estimated right ventricular systolic pressure from tricuspid regurgitation is normal (<35 mmHg).            Assessment:          Diagnosis Plan   1. Hospital discharge follow-up        2. Nonrheumatic aortic valve stenosis        3. Paroxysmal SVT (supraventricular tachycardia)                Plan:       1.  Valvular heart disease-echocardiogram showed mild to moderate aortic stenosis.  No progression, not felt to contribute to her syncope  2.  Bronchospastic lung disease, history of RSV pneumonia, history of DVT, prior TB-followed by pulmonary  3.  Nocturnal oxygen therapy per pulmonary  4.  Zenker's diverticulum with esophageal obstruction status post Zenker's diverticuli to me March 31, 2022.  5.  SVT- frequent but really brief episodes of SVT.  We discussed medical therapy with either metoprolol or diltiazem versus simply following since her syncope occurred whenshe had just stopped steroids.  They will think about this and let me know.    Thank you very much for allowing us to participate in the care of this pleasant patient.  Please don't hesitate to call if I can be of assistance in any way.      Current Outpatient Medications:   •  albuterol (PROVENTIL) (2.5 MG/3ML) 0.083% nebulizer solution, Take 2.5 mg by nebulization 2 (Two) Times a Day., Disp: , Rfl:   •  aspirin 81 MG EC tablet, Take 1 tablet by mouth Daily., Disp: , Rfl:   •  B Complex Vitamins (vitamin b complex) capsule capsule, Take 1 capsule by mouth Daily., Disp: , Rfl:   •  Calcium Carbonate 1500 (600 Ca) MG tablet, Take 1 tablet by mouth Daily., Disp: , Rfl:   •  Dextromethorphan-guaiFENesin (MUCINEX DM PO), Take  by mouth Daily., Disp: , Rfl:   •  ferrous sulfate 325 (65 FE) MG tablet, Take 1 tablet by mouth Daily With Breakfast., Disp: , Rfl:   •  Fluticasone-Salmeterol (Advair Diskus) 250-50 MCG/ACT DISKUS, Inhale 2 (Two) Times a Day., Disp: , Rfl:   •  Misc Natural Products (OSTEO BI-FLEX ADV TRIPLE ST PO),  Take 1 tablet by mouth Daily., Disp: , Rfl:   •  MULTIPLE VITAMIN PO, Take 1 tablet by mouth Daily., Disp: , Rfl:   •  Vitamin D, Cholecalciferol, 1000 units capsule, Take 1 capsule by mouth Daily., Disp: , Rfl:

## 2023-11-07 ENCOUNTER — TRANSCRIBE ORDERS (OUTPATIENT)
Dept: LAB | Facility: HOSPITAL | Age: 88
End: 2023-11-07
Payer: MEDICARE

## 2023-11-07 ENCOUNTER — LAB (OUTPATIENT)
Dept: LAB | Facility: HOSPITAL | Age: 88
End: 2023-11-07
Payer: MEDICARE

## 2023-11-07 DIAGNOSIS — K52.9 INFLAMMATORY BOWEL DISEASE: Primary | ICD-10-CM

## 2023-11-07 DIAGNOSIS — R93.89 ABNORMAL FINDINGS ON DIAGNOSTIC IMAGING OF OTHER SPECIFIED BODY STRUCTURES: ICD-10-CM

## 2023-11-07 DIAGNOSIS — A04.72: Primary | ICD-10-CM

## 2023-11-07 DIAGNOSIS — K52.9 INFLAMMATORY BOWEL DISEASE: ICD-10-CM

## 2023-11-07 LAB
ALBUMIN SERPL-MCNC: 3.8 G/DL (ref 3.5–5.2)
ALBUMIN/GLOB SERPL: 1.2 G/DL
ALP SERPL-CCNC: 101 U/L (ref 39–117)
ALT SERPL W P-5'-P-CCNC: 14 U/L (ref 1–33)
ANION GAP SERPL CALCULATED.3IONS-SCNC: 7 MMOL/L (ref 5–15)
AST SERPL-CCNC: 20 U/L (ref 1–32)
BASOPHILS # BLD AUTO: 0.06 10*3/MM3 (ref 0–0.2)
BASOPHILS NFR BLD AUTO: 0.6 % (ref 0–1.5)
BILIRUB SERPL-MCNC: 0.3 MG/DL (ref 0–1.2)
BUN SERPL-MCNC: 25 MG/DL (ref 8–23)
BUN/CREAT SERPL: 19.5 (ref 7–25)
CALCIUM SPEC-SCNC: 9.4 MG/DL (ref 8.2–9.6)
CHLORIDE SERPL-SCNC: 99 MMOL/L (ref 98–107)
CO2 SERPL-SCNC: 27 MMOL/L (ref 22–29)
CREAT SERPL-MCNC: 1.28 MG/DL (ref 0.57–1)
DEPRECATED RDW RBC AUTO: 41.1 FL (ref 37–54)
EGFRCR SERPLBLD CKD-EPI 2021: 38.4 ML/MIN/1.73
EOSINOPHIL # BLD AUTO: 0.38 10*3/MM3 (ref 0–0.4)
EOSINOPHIL NFR BLD AUTO: 3.9 % (ref 0.3–6.2)
ERYTHROCYTE [DISTWIDTH] IN BLOOD BY AUTOMATED COUNT: 13 % (ref 12.3–15.4)
GLOBULIN UR ELPH-MCNC: 3.1 GM/DL
GLUCOSE SERPL-MCNC: 103 MG/DL (ref 65–99)
HCT VFR BLD AUTO: 31.7 % (ref 34–46.6)
HGB BLD-MCNC: 10.7 G/DL (ref 12–15.9)
IMM GRANULOCYTES # BLD AUTO: 0.04 10*3/MM3 (ref 0–0.05)
IMM GRANULOCYTES NFR BLD AUTO: 0.4 % (ref 0–0.5)
LYMPHOCYTES # BLD AUTO: 2.5 10*3/MM3 (ref 0.7–3.1)
LYMPHOCYTES NFR BLD AUTO: 25.5 % (ref 19.6–45.3)
MCH RBC QN AUTO: 29.2 PG (ref 26.6–33)
MCHC RBC AUTO-ENTMCNC: 33.8 G/DL (ref 31.5–35.7)
MCV RBC AUTO: 86.6 FL (ref 79–97)
MONOCYTES # BLD AUTO: 1.02 10*3/MM3 (ref 0.1–0.9)
MONOCYTES NFR BLD AUTO: 10.4 % (ref 5–12)
NEUTROPHILS NFR BLD AUTO: 5.82 10*3/MM3 (ref 1.7–7)
NEUTROPHILS NFR BLD AUTO: 59.2 % (ref 42.7–76)
NRBC BLD AUTO-RTO: 0 /100 WBC (ref 0–0.2)
PLATELET # BLD AUTO: 303 10*3/MM3 (ref 140–450)
PMV BLD AUTO: 9.7 FL (ref 6–12)
POTASSIUM SERPL-SCNC: 4.5 MMOL/L (ref 3.5–5.2)
PROT SERPL-MCNC: 6.9 G/DL (ref 6–8.5)
RBC # BLD AUTO: 3.66 10*6/MM3 (ref 3.77–5.28)
SODIUM SERPL-SCNC: 133 MMOL/L (ref 136–145)
T4 FREE SERPL-MCNC: 1.08 NG/DL (ref 0.93–1.7)
TSH SERPL DL<=0.05 MIU/L-ACNC: 3.65 UIU/ML (ref 0.27–4.2)
WBC NRBC COR # BLD: 9.82 10*3/MM3 (ref 3.4–10.8)

## 2023-11-07 PROCEDURE — 36415 COLL VENOUS BLD VENIPUNCTURE: CPT

## 2023-11-07 PROCEDURE — 84439 ASSAY OF FREE THYROXINE: CPT

## 2023-11-07 PROCEDURE — 80053 COMPREHEN METABOLIC PANEL: CPT

## 2023-11-07 PROCEDURE — 84443 ASSAY THYROID STIM HORMONE: CPT

## 2023-11-07 PROCEDURE — 85025 COMPLETE CBC W/AUTO DIFF WBC: CPT

## 2023-11-08 ENCOUNTER — LAB (OUTPATIENT)
Dept: LAB | Facility: HOSPITAL | Age: 88
End: 2023-11-08
Payer: MEDICARE

## 2023-11-08 DIAGNOSIS — A04.72: ICD-10-CM

## 2023-11-08 LAB — C DIFF TOX GENS STL QL NAA+PROBE: NEGATIVE

## 2023-11-08 PROCEDURE — 87493 C DIFF AMPLIFIED PROBE: CPT

## 2023-11-16 ENCOUNTER — TRANSCRIBE ORDERS (OUTPATIENT)
Dept: ADMINISTRATIVE | Facility: HOSPITAL | Age: 88
End: 2023-11-16
Payer: MEDICARE

## 2023-11-16 ENCOUNTER — LAB (OUTPATIENT)
Dept: LAB | Facility: HOSPITAL | Age: 88
End: 2023-11-16
Payer: MEDICARE

## 2023-11-16 DIAGNOSIS — K51.918 ULCERATIVE COLITIS, UNSPECIFIED WITH OTHER COMPLICATION: ICD-10-CM

## 2023-11-16 DIAGNOSIS — K52.9 CHRONIC DIARRHEA: Primary | ICD-10-CM

## 2023-11-16 DIAGNOSIS — K52.9 CHRONIC DIARRHEA: ICD-10-CM

## 2023-11-16 LAB

## 2023-11-16 PROCEDURE — 87507 IADNA-DNA/RNA PROBE TQ 12-25: CPT

## 2024-05-06 ENCOUNTER — APPOINTMENT (OUTPATIENT)
Dept: GENERAL RADIOLOGY | Facility: HOSPITAL | Age: 89
End: 2024-05-06
Payer: MEDICARE

## 2024-05-06 ENCOUNTER — HOSPITAL ENCOUNTER (OUTPATIENT)
Facility: HOSPITAL | Age: 89
Setting detail: OBSERVATION
Discharge: HOME | End: 2024-05-08
Attending: EMERGENCY MEDICINE | Admitting: INTERNAL MEDICINE
Payer: MEDICARE

## 2024-05-06 DIAGNOSIS — N17.9 AKI (ACUTE KIDNEY INJURY): ICD-10-CM

## 2024-05-06 DIAGNOSIS — J20.9 ACUTE BRONCHITIS, UNSPECIFIED ORGANISM: ICD-10-CM

## 2024-05-06 DIAGNOSIS — B34.8 RHINOVIRUS: ICD-10-CM

## 2024-05-06 DIAGNOSIS — D64.9 SYMPTOMATIC ANEMIA: Primary | ICD-10-CM

## 2024-05-06 DIAGNOSIS — I35.0 NONRHEUMATIC AORTIC VALVE STENOSIS: ICD-10-CM

## 2024-05-06 DIAGNOSIS — I50.9 ACUTE ON CHRONIC CONGESTIVE HEART FAILURE, UNSPECIFIED HEART FAILURE TYPE: ICD-10-CM

## 2024-05-06 LAB
ABO GROUP BLD: NORMAL
ALBUMIN SERPL-MCNC: 3.2 G/DL (ref 3.5–5.2)
ALBUMIN/GLOB SERPL: 1.1 G/DL
ALP SERPL-CCNC: 100 U/L (ref 39–117)
ALT SERPL W P-5'-P-CCNC: 16 U/L (ref 1–33)
ANION GAP SERPL CALCULATED.3IONS-SCNC: 8.3 MMOL/L (ref 5–15)
AST SERPL-CCNC: 23 U/L (ref 1–32)
B PARAPERT DNA SPEC QL NAA+PROBE: NOT DETECTED
B PERT DNA SPEC QL NAA+PROBE: NOT DETECTED
BASOPHILS # BLD AUTO: 0.05 10*3/MM3 (ref 0–0.2)
BASOPHILS NFR BLD AUTO: 0.6 % (ref 0–1.5)
BILIRUB SERPL-MCNC: <0.2 MG/DL (ref 0–1.2)
BLD GP AB SCN SERPL QL: NEGATIVE
BUN SERPL-MCNC: 23 MG/DL (ref 8–23)
BUN/CREAT SERPL: 15.5 (ref 7–25)
C PNEUM DNA NPH QL NAA+NON-PROBE: NOT DETECTED
CALCIUM SPEC-SCNC: 8.3 MG/DL (ref 8.2–9.6)
CHLORIDE SERPL-SCNC: 103 MMOL/L (ref 98–107)
CO2 SERPL-SCNC: 24.7 MMOL/L (ref 22–29)
CREAT SERPL-MCNC: 1.48 MG/DL (ref 0.57–1)
D-LACTATE SERPL-SCNC: 1.3 MMOL/L (ref 0.5–2)
DEPRECATED RDW RBC AUTO: 44.5 FL (ref 37–54)
EGFRCR SERPLBLD CKD-EPI 2021: 32.3 ML/MIN/1.73
EOSINOPHIL # BLD AUTO: 0.61 10*3/MM3 (ref 0–0.4)
EOSINOPHIL NFR BLD AUTO: 7.7 % (ref 0.3–6.2)
ERYTHROCYTE [DISTWIDTH] IN BLOOD BY AUTOMATED COUNT: 13.1 % (ref 12.3–15.4)
FLUAV SUBTYP SPEC NAA+PROBE: NOT DETECTED
FLUBV RNA ISLT QL NAA+PROBE: NOT DETECTED
GEN 5 2HR TROPONIN T REFLEX: 29 NG/L
GLOBULIN UR ELPH-MCNC: 2.9 GM/DL
GLUCOSE SERPL-MCNC: 106 MG/DL (ref 65–99)
HADV DNA SPEC NAA+PROBE: NOT DETECTED
HCOV 229E RNA SPEC QL NAA+PROBE: NOT DETECTED
HCOV HKU1 RNA SPEC QL NAA+PROBE: NOT DETECTED
HCOV NL63 RNA SPEC QL NAA+PROBE: NOT DETECTED
HCOV OC43 RNA SPEC QL NAA+PROBE: NOT DETECTED
HCT VFR BLD AUTO: 21.4 % (ref 34–46.6)
HGB BLD-MCNC: 7.2 G/DL (ref 12–15.9)
HMPV RNA NPH QL NAA+NON-PROBE: NOT DETECTED
HOLD SPECIMEN: NORMAL
HOLD SPECIMEN: NORMAL
HPIV1 RNA ISLT QL NAA+PROBE: NOT DETECTED
HPIV2 RNA SPEC QL NAA+PROBE: NOT DETECTED
HPIV3 RNA NPH QL NAA+PROBE: NOT DETECTED
HPIV4 P GENE NPH QL NAA+PROBE: NOT DETECTED
IMM GRANULOCYTES # BLD AUTO: 0.03 10*3/MM3 (ref 0–0.05)
IMM GRANULOCYTES NFR BLD AUTO: 0.4 % (ref 0–0.5)
LYMPHOCYTES # BLD AUTO: 2.19 10*3/MM3 (ref 0.7–3.1)
LYMPHOCYTES NFR BLD AUTO: 27.7 % (ref 19.6–45.3)
M PNEUMO IGG SER IA-ACNC: NOT DETECTED
MCH RBC QN AUTO: 31.4 PG (ref 26.6–33)
MCHC RBC AUTO-ENTMCNC: 33.6 G/DL (ref 31.5–35.7)
MCV RBC AUTO: 93.4 FL (ref 79–97)
MONOCYTES # BLD AUTO: 0.85 10*3/MM3 (ref 0.1–0.9)
MONOCYTES NFR BLD AUTO: 10.7 % (ref 5–12)
NEUTROPHILS NFR BLD AUTO: 4.19 10*3/MM3 (ref 1.7–7)
NEUTROPHILS NFR BLD AUTO: 52.9 % (ref 42.7–76)
NRBC BLD AUTO-RTO: 0 /100 WBC (ref 0–0.2)
NT-PROBNP SERPL-MCNC: 2475 PG/ML (ref 0–1800)
PLATELET # BLD AUTO: 237 10*3/MM3 (ref 140–450)
PMV BLD AUTO: 9.9 FL (ref 6–12)
POTASSIUM SERPL-SCNC: 4.5 MMOL/L (ref 3.5–5.2)
PROCALCITONIN SERPL-MCNC: 0.08 NG/ML (ref 0–0.25)
PROT SERPL-MCNC: 6.1 G/DL (ref 6–8.5)
QT INTERVAL: 365 MS
QTC INTERVAL: 429 MS
RBC # BLD AUTO: 2.29 10*6/MM3 (ref 3.77–5.28)
RH BLD: POSITIVE
RHINOVIRUS RNA SPEC NAA+PROBE: DETECTED
RSV RNA NPH QL NAA+NON-PROBE: NOT DETECTED
SARS-COV-2 RNA NPH QL NAA+NON-PROBE: NOT DETECTED
SODIUM SERPL-SCNC: 136 MMOL/L (ref 136–145)
T&S EXPIRATION DATE: NORMAL
TROPONIN T DELTA: 2 NG/L
TROPONIN T SERPL HS-MCNC: 27 NG/L
TROPONIN T SERPL HS-MCNC: 30 NG/L
WBC NRBC COR # BLD AUTO: 7.92 10*3/MM3 (ref 3.4–10.8)
WHOLE BLOOD HOLD COAG: NORMAL
WHOLE BLOOD HOLD SPECIMEN: NORMAL

## 2024-05-06 PROCEDURE — 86900 BLOOD TYPING SEROLOGIC ABO: CPT | Performed by: PHYSICIAN ASSISTANT

## 2024-05-06 PROCEDURE — G0378 HOSPITAL OBSERVATION PER HR: HCPCS

## 2024-05-06 PROCEDURE — 86920 COMPATIBILITY TEST SPIN: CPT

## 2024-05-06 PROCEDURE — 86902 BLOOD TYPE ANTIGEN DONOR EA: CPT

## 2024-05-06 PROCEDURE — 84145 PROCALCITONIN (PCT): CPT | Performed by: PHYSICIAN ASSISTANT

## 2024-05-06 PROCEDURE — 84484 ASSAY OF TROPONIN QUANT: CPT

## 2024-05-06 PROCEDURE — 85025 COMPLETE CBC W/AUTO DIFF WBC: CPT

## 2024-05-06 PROCEDURE — 25010000002 METHYLPREDNISOLONE PER 125 MG: Performed by: PHYSICIAN ASSISTANT

## 2024-05-06 PROCEDURE — 93005 ELECTROCARDIOGRAM TRACING: CPT

## 2024-05-06 PROCEDURE — 94799 UNLISTED PULMONARY SVC/PX: CPT

## 2024-05-06 PROCEDURE — 99285 EMERGENCY DEPT VISIT HI MDM: CPT

## 2024-05-06 PROCEDURE — 83880 ASSAY OF NATRIURETIC PEPTIDE: CPT

## 2024-05-06 PROCEDURE — 25010000002 METHYLPREDNISOLONE PER 40 MG: Performed by: INTERNAL MEDICINE

## 2024-05-06 PROCEDURE — 94664 DEMO&/EVAL PT USE INHALER: CPT

## 2024-05-06 PROCEDURE — 80053 COMPREHEN METABOLIC PANEL: CPT

## 2024-05-06 PROCEDURE — 94640 AIRWAY INHALATION TREATMENT: CPT

## 2024-05-06 PROCEDURE — 86850 RBC ANTIBODY SCREEN: CPT | Performed by: PHYSICIAN ASSISTANT

## 2024-05-06 PROCEDURE — 83605 ASSAY OF LACTIC ACID: CPT | Performed by: PHYSICIAN ASSISTANT

## 2024-05-06 PROCEDURE — 94760 N-INVAS EAR/PLS OXIMETRY 1: CPT

## 2024-05-06 PROCEDURE — 96374 THER/PROPH/DIAG INJ IV PUSH: CPT

## 2024-05-06 PROCEDURE — 86901 BLOOD TYPING SEROLOGIC RH(D): CPT | Performed by: PHYSICIAN ASSISTANT

## 2024-05-06 PROCEDURE — 0202U NFCT DS 22 TRGT SARS-COV-2: CPT | Performed by: PHYSICIAN ASSISTANT

## 2024-05-06 PROCEDURE — 71045 X-RAY EXAM CHEST 1 VIEW: CPT

## 2024-05-06 PROCEDURE — 86922 COMPATIBILITY TEST ANTIGLOB: CPT

## 2024-05-06 PROCEDURE — 84484 ASSAY OF TROPONIN QUANT: CPT | Performed by: INTERNAL MEDICINE

## 2024-05-06 PROCEDURE — 93010 ELECTROCARDIOGRAM REPORT: CPT | Performed by: INTERNAL MEDICINE

## 2024-05-06 PROCEDURE — 94761 N-INVAS EAR/PLS OXIMETRY MLT: CPT

## 2024-05-06 PROCEDURE — 96376 TX/PRO/DX INJ SAME DRUG ADON: CPT

## 2024-05-06 RX ORDER — ONDANSETRON 4 MG/1
4 TABLET, ORALLY DISINTEGRATING ORAL EVERY 6 HOURS PRN
Status: DISCONTINUED | OUTPATIENT
Start: 2024-05-06 | End: 2024-05-08 | Stop reason: HOSPADM

## 2024-05-06 RX ORDER — ALBUTEROL SULFATE 2.5 MG/3ML
2.5 SOLUTION RESPIRATORY (INHALATION) EVERY 6 HOURS PRN
Status: DISCONTINUED | OUTPATIENT
Start: 2024-05-06 | End: 2024-05-08 | Stop reason: HOSPADM

## 2024-05-06 RX ORDER — DIPHENOXYLATE HYDROCHLORIDE AND ATROPINE SULFATE 2.5; .025 MG/1; MG/1
1 TABLET ORAL DAILY
Status: DISCONTINUED | OUTPATIENT
Start: 2024-05-07 | End: 2024-05-08 | Stop reason: HOSPADM

## 2024-05-06 RX ORDER — BISACODYL 10 MG
10 SUPPOSITORY, RECTAL RECTAL DAILY PRN
Status: DISCONTINUED | OUTPATIENT
Start: 2024-05-06 | End: 2024-05-08 | Stop reason: HOSPADM

## 2024-05-06 RX ORDER — ACETAMINOPHEN 325 MG/1
650 TABLET ORAL EVERY 4 HOURS PRN
Status: DISCONTINUED | OUTPATIENT
Start: 2024-05-06 | End: 2024-05-08 | Stop reason: HOSPADM

## 2024-05-06 RX ORDER — BUDESONIDE AND FORMOTEROL FUMARATE DIHYDRATE 160; 4.5 UG/1; UG/1
2 AEROSOL RESPIRATORY (INHALATION)
Status: DISCONTINUED | OUTPATIENT
Start: 2024-05-06 | End: 2024-05-08 | Stop reason: HOSPADM

## 2024-05-06 RX ORDER — AMOXICILLIN 250 MG
2 CAPSULE ORAL 2 TIMES DAILY PRN
Status: DISCONTINUED | OUTPATIENT
Start: 2024-05-06 | End: 2024-05-08 | Stop reason: HOSPADM

## 2024-05-06 RX ORDER — IPRATROPIUM BROMIDE AND ALBUTEROL SULFATE 2.5; .5 MG/3ML; MG/3ML
3 SOLUTION RESPIRATORY (INHALATION)
Status: DISCONTINUED | OUTPATIENT
Start: 2024-05-06 | End: 2024-05-08 | Stop reason: HOSPADM

## 2024-05-06 RX ORDER — IPRATROPIUM BROMIDE AND ALBUTEROL SULFATE 2.5; .5 MG/3ML; MG/3ML
3 SOLUTION RESPIRATORY (INHALATION) ONCE
Status: COMPLETED | OUTPATIENT
Start: 2024-05-06 | End: 2024-05-06

## 2024-05-06 RX ORDER — UREA 10 %
3 LOTION (ML) TOPICAL NIGHTLY PRN
Status: DISCONTINUED | OUTPATIENT
Start: 2024-05-06 | End: 2024-05-08 | Stop reason: HOSPADM

## 2024-05-06 RX ORDER — DOXYCYCLINE 50 MG/1
50 CAPSULE ORAL 2 TIMES DAILY
COMMUNITY
End: 2024-05-08 | Stop reason: HOSPADM

## 2024-05-06 RX ORDER — CYCLOSPORINE 0.5 MG/ML
1 EMULSION OPHTHALMIC 2 TIMES DAILY
COMMUNITY

## 2024-05-06 RX ORDER — POLYETHYLENE GLYCOL 400 AND PROPYLENE GLYCOL 4; 3 MG/ML; MG/ML
1 GEL OPHTHALMIC NIGHTLY
COMMUNITY

## 2024-05-06 RX ORDER — SODIUM CHLORIDE 0.9 % (FLUSH) 0.9 %
10 SYRINGE (ML) INJECTION AS NEEDED
Status: DISCONTINUED | OUTPATIENT
Start: 2024-05-06 | End: 2024-05-08 | Stop reason: HOSPADM

## 2024-05-06 RX ORDER — MELATONIN
1000 DAILY
Status: DISCONTINUED | OUTPATIENT
Start: 2024-05-07 | End: 2024-05-08 | Stop reason: HOSPADM

## 2024-05-06 RX ORDER — ONDANSETRON 2 MG/ML
4 INJECTION INTRAMUSCULAR; INTRAVENOUS EVERY 6 HOURS PRN
Status: DISCONTINUED | OUTPATIENT
Start: 2024-05-06 | End: 2024-05-08 | Stop reason: HOSPADM

## 2024-05-06 RX ORDER — METHYLPREDNISOLONE SODIUM SUCCINATE 125 MG/2ML
125 INJECTION, POWDER, LYOPHILIZED, FOR SOLUTION INTRAMUSCULAR; INTRAVENOUS ONCE
Status: COMPLETED | OUTPATIENT
Start: 2024-05-06 | End: 2024-05-06

## 2024-05-06 RX ORDER — CETIRIZINE HYDROCHLORIDE 10 MG/1
10 TABLET ORAL DAILY
COMMUNITY

## 2024-05-06 RX ORDER — CALCIUM CARBONATE 500(1250)
500 TABLET ORAL DAILY
Status: DISCONTINUED | OUTPATIENT
Start: 2024-05-07 | End: 2024-05-08 | Stop reason: HOSPADM

## 2024-05-06 RX ORDER — METHYLPREDNISOLONE SODIUM SUCCINATE 40 MG/ML
40 INJECTION, POWDER, LYOPHILIZED, FOR SOLUTION INTRAMUSCULAR; INTRAVENOUS EVERY 8 HOURS
Status: DISCONTINUED | OUTPATIENT
Start: 2024-05-06 | End: 2024-05-08 | Stop reason: HOSPADM

## 2024-05-06 RX ORDER — POLYETHYLENE GLYCOL 3350 17 G/17G
17 POWDER, FOR SOLUTION ORAL DAILY PRN
Status: DISCONTINUED | OUTPATIENT
Start: 2024-05-06 | End: 2024-05-08 | Stop reason: HOSPADM

## 2024-05-06 RX ORDER — BISACODYL 5 MG/1
5 TABLET, DELAYED RELEASE ORAL DAILY PRN
Status: DISCONTINUED | OUTPATIENT
Start: 2024-05-06 | End: 2024-05-08 | Stop reason: HOSPADM

## 2024-05-06 RX ORDER — MULTIVIT WITH MINERALS/LUTEIN
1000 TABLET ORAL DAILY
COMMUNITY

## 2024-05-06 RX ORDER — ASPIRIN 81 MG/1
81 TABLET ORAL DAILY
Status: DISCONTINUED | OUTPATIENT
Start: 2024-05-07 | End: 2024-05-08 | Stop reason: HOSPADM

## 2024-05-06 RX ORDER — CHLORAL HYDRATE 500 MG
1000 CAPSULE ORAL 2 TIMES DAILY WITH MEALS
COMMUNITY

## 2024-05-06 RX ORDER — GUAIFENESIN 600 MG/1
600 TABLET, EXTENDED RELEASE ORAL EVERY 12 HOURS SCHEDULED
Status: DISCONTINUED | OUTPATIENT
Start: 2024-05-06 | End: 2024-05-08 | Stop reason: HOSPADM

## 2024-05-06 RX ORDER — FERROUS SULFATE 325(65) MG
325 TABLET ORAL
Status: DISCONTINUED | OUTPATIENT
Start: 2024-05-07 | End: 2024-05-08 | Stop reason: HOSPADM

## 2024-05-06 RX ADMIN — IPRATROPIUM BROMIDE AND ALBUTEROL SULFATE 3 ML: .5; 3 SOLUTION RESPIRATORY (INHALATION) at 17:48

## 2024-05-06 RX ADMIN — METHYLPREDNISOLONE SODIUM SUCCINATE 125 MG: 125 INJECTION, POWDER, FOR SOLUTION INTRAMUSCULAR; INTRAVENOUS at 17:32

## 2024-05-06 RX ADMIN — GUAIFENESIN 600 MG: 600 TABLET, EXTENDED RELEASE ORAL at 22:34

## 2024-05-06 RX ADMIN — METHYLPREDNISOLONE SODIUM SUCCINATE 40 MG: 40 INJECTION, POWDER, FOR SOLUTION INTRAMUSCULAR; INTRAVENOUS at 22:32

## 2024-05-06 NOTE — ED PROVIDER NOTES
MD ATTESTATION NOTE     SHARED VISIT: This visit was performed by BOTH a physician and an APC. The substantive portion of the medical decision making was performed by this attesting physician who made or approved the management plan and takes responsibility for patient management. All studies in the APC note (if performed) were independently interpreted by me.  The JUANITA and I have discussed this patient's history, physical exam, and treatment plan. I have reviewed the documentation and personally had a face to face interaction with the patient. I affirm the documentation and agree with the treatment and plan. I provided a substantive portion of the care of the patient.  I personally performed the physical exam in its entirety, and below are my findings.      Brief HPI: Patient complains of increased shortness of breath, cough, and congestion for the past 2 days.  Shortness of breath is worse with exertion.  She has chronic dyspnea but is not on home O2.  Denies fever, chills, chest pain, or increased leg swelling.    PHYSICAL EXAM  ED Triage Vitals [05/06/24 1547]   Temp Heart Rate Resp BP SpO2   97.7 °F (36.5 °C) 106 22 -- 93 %      Temp src Heart Rate Source Patient Position BP Location FiO2 (%)   Tympanic Monitor -- -- --         GENERAL: Awake, alert, oriented x 3.  Nontoxic-appearing elderly female.  Resting comfortably in no acute distress  HENT: nares patent  EYES: no scleral icterus  CV: regular rhythm, normal rate  RESPIRATORY: normal effort, clear to auscultation bilaterally  ABDOMEN: soft, nontender  MUSCULOSKELETAL: Extremities are nontender.  No pedal edema  NEURO: alert, moves all extremities, follows commands  PSYCH:  calm, cooperative  SKIN: warm, dry    Vital signs and nursing notes reviewed.        Plan: Obtain labs, chest x-ray, and EKG. administer Solu-Medrol and DuoNeb.    Chest x-ray personally interpreted by me.  My personal interpretation is: Heart size is normal.  There is vague haziness in  the left lung base.    EKG personally interpreted by me.  My personal interpretation is:          EKG time: 1551  Rhythm/Rate: Sinus rhythm, rate 83  P waves and IN: Normal  QRS, axis: LAD  ST and T waves: Normal    Interpreted Contemporaneously by me, independently viewed    MDM: Patient presented to ED complaining of cough and shortness of breath.  Chest x-ray showed basilar atelectasis/infiltrate.  Respiratory panel was positive for rhinovirus.  Hemoglobin was 7.2 which was down from 10.7 6 months ago.  Shortness of breath is likely multifactorial including viral URI and worsening anemia.  Patient will be admitted.    ED Course as of 05/06/24 1812   Mon May 06, 2024   1650 Patient presents with a several week history of exertional dyspnea, as well as a 2-day history of cough and congestion.  Patient audibly wheezing on exam and tachypneic.  Differential diagnoses include but not pneumonia, viral URI, CHF. [EE]   1657 Chest x-ray independently interpreted myself shows left interstitial prominence as well as a left infiltrate/atelectasis. [EE]   1711 EKG independently interpreted myself.  Time 1551.  Sinus rhythm, 83 bpm.  Normal P/DANYA.  QRS normal with left axis deviation.  Nonspecific T wave changes.  Similar to prior EKG from 4/19/2023. [EE]   1745 This was 600, 2 years ago [EE]   1746 Hemoglobin(!): 7.2  This was 10.7, 6 months ago [EE]   1748 Patient with significant drop in hemoglobin, as well as wheezing, tachypnea, likely CHF exacerbation.  She is Hemoccult positive without blood thinners.  She has stable vitals.  Patient will need to be admitted for further evaluation. [EE]   1803 Human Rhinovirus/Enterovirus(!): Detected [EE]   1810 I discussed case with Dr. Cruz.  She agrees to admit. [EE]      ED Course User Index  [EE] Omar Ortiz PA Holland, William D, MD  05/06/24 1813

## 2024-05-06 NOTE — ED NOTES
Nursing report ED to floor  Shaina Mcknight  89 y.o.  female    HPI :  HPI (Adult)  Stated Reason for Visit: SOB  History Obtained From: patient    Chief Complaint  Chief Complaint   Patient presents with    Shortness of Breath    Cough       Admitting doctor:   Kaye Cruz MD    Admitting diagnosis:   The primary encounter diagnosis was Symptomatic anemia. Diagnoses of Acute bronchitis, unspecified organism, Acute on chronic congestive heart failure, unspecified heart failure type, JACLYN (acute kidney injury), Nonrheumatic aortic valve stenosis, and Rhinovirus were also pertinent to this visit.    Code status:   Current Code Status       Date Active Code Status Order ID Comments User Context       Prior            Allergies:   Molds & smuts, Penicillins, Tessalon [benzonatate], and Oyster shell    Isolation:   Droplet    Intake and Output  No intake or output data in the 24 hours ending 05/06/24 1830    Weight:       05/06/24  1647   Weight: 61.2 kg (135 lb)       Most recent vitals:   Vitals:    05/06/24 1701 05/06/24 1731 05/06/24 1748 05/06/24 1751   BP: 130/59 144/72     BP Location:       Patient Position:       Pulse: 77 75 77 77   Resp:   22 22   Temp:       TempSrc:       SpO2: 97% 96% 98% 100%   Weight:       Height:           Active LDAs/IV Access:   Lines, Drains & Airways       Active LDAs       Name Placement date Placement time Site Days    Peripheral IV 05/06/24 1646 Right Antecubital 05/06/24  1646  Antecubital  less than 1                    Labs (abnormal labs have a star):   Labs Reviewed   RESPIRATORY PANEL PCR W/ COVID-19 (SARS-COV-2), NP SWAB IN UTM/VTP, 2 HR TAT - Abnormal; Notable for the following components:       Result Value    Human Rhinovirus/Enterovirus Detected (*)     All other components within normal limits    Narrative:     In the setting of a positive respiratory panel with a viral infection PLUS a negative procalcitonin without other underlying concern for bacterial  infection, consider observing off antibiotics or discontinuation of antibiotics and continue supportive care. If the respiratory panel is positive for atypical bacterial infection (Bordetella pertussis, Chlamydophila pneumoniae, or Mycoplasma pneumoniae), consider antibiotic de-escalation to target atypical bacterial infection.   COMPREHENSIVE METABOLIC PANEL - Abnormal; Notable for the following components:    Glucose 106 (*)     Creatinine 1.48 (*)     Albumin 3.2 (*)     eGFR 32.3 (*)     All other components within normal limits    Narrative:     GFR Normal >60  Chronic Kidney Disease <60  Kidney Failure <15    The GFR formula is only valid for adults with stable renal function between ages 18 and 70.   BNP (IN-HOUSE) - Abnormal; Notable for the following components:    proBNP 2,475.0 (*)     All other components within normal limits    Narrative:     This assay is used as an aid in the diagnosis of individuals suspected of having heart failure. It can be used as an aid in the diagnosis of acute decompensated heart failure (ADHF) in patients presenting with signs and symptoms of ADHF to the emergency department (ED). In addition, NT-proBNP of <300 pg/mL indicates ADHF is not likely.    Age Range Result Interpretation  NT-proBNP Concentration (pg/mL:      <50             Positive            >450                   Gray                 300-450                    Negative             <300    50-75           Positive            >900                  Gray                300-900                  Negative            <300      >75             Positive            >1800                  Gray                300-1800                  Negative            <300   SINGLE HS TROPONIN T - Abnormal; Notable for the following components:    HS Troponin T 30 (*)     All other components within normal limits    Narrative:     High Sensitive Troponin T Reference Range:  <14.0 ng/L- Negative Female for AMI  <22.0 ng/L- Negative Male for  "AMI  >=14 - Abnormal Female indicating possible myocardial injury.  >=22 - Abnormal Male indicating possible myocardial injury.   Clinicians would have to utilize clinical acumen, EKG, Troponin, and serial changes to determine if it is an Acute Myocardial Infarction or myocardial injury due to an underlying chronic condition.        CBC WITH AUTO DIFFERENTIAL - Abnormal; Notable for the following components:    RBC 2.29 (*)     Hemoglobin 7.2 (*)     Hematocrit 21.4 (*)     Eosinophil % 7.7 (*)     Eosinophils, Absolute 0.61 (*)     All other components within normal limits   LACTIC ACID, PLASMA - Normal   PROCALCITONIN - Normal    Narrative:     As a Marker for Sepsis (Non-Neonates):    1. <0.5 ng/mL represents a low risk of severe sepsis and/or septic shock.  2. >2 ng/mL represents a high risk of severe sepsis and/or septic shock.    As a Marker for Lower Respiratory Tract Infections that require antibiotic therapy:    PCT on Admission    Antibiotic Therapy       6-12 Hrs later    >0.5                Strongly Recommended  >0.25 - <0.5        Recommended   0.1 - 0.25          Discouraged              Remeasure/reassess PCT  <0.1                Strongly Discouraged     Remeasure/reassess PCT    As 28 day mortality risk marker: \"Change in Procalcitonin Result\" (>80% or <=80%) if Day 0 (or Day 1) and Day 4 values are available. Refer to http://www.[employer]-pct-calculator.com    Change in PCT <=80%  A decrease of PCT levels below or equal to 80% defines a positive change in PCT test result representing a higher risk for 28-day all-cause mortality of patients diagnosed with severe sepsis for septic shock.    Change in PCT >80%  A decrease of PCT levels of more than 80% defines a negative change in PCT result representing a lower risk for 28-day all-cause mortality of patients diagnosed with severe sepsis or septic shock.      RAINBOW DRAW    Narrative:     The following orders were created for panel order [place] " Draw.  Procedure                               Abnormality         Status                     ---------                               -----------         ------                     Green Top (Gel)[[id number]]                                  Final result               Lavender Top[[id number]]                                     Final result               Gold Top - SST[[id number]]                                   Final result               Light Blue Top[[id number]]                                   Final result                 Please view results for these tests on the individual orders.   POCT OCCULT BLOOD STOOL (ED ONLY)   TYPE AND SCREEN   CBC AND DIFFERENTIAL    Narrative:     The following orders were created for panel order CBC & Differential.  Procedure                               Abnormality         Status                     ---------                               -----------         ------                     CBC Auto Differential[[id number]]        Abnormal            Final result                 Please view results for these tests on the individual orders.   GREEN TOP   LAVENDER TOP   GOLD TOP - SST   LIGHT BLUE TOP       EKG:   ECG 12 Lead ED Triage Standing Order; SOA   Final Result   HEART RATE= 83  bpm   RR Interval= 723  ms   VT Interval= 187  ms   P Horizontal Axis= 10  deg   P Front Axis= 65  deg   QRSD Interval= 99  ms   QT Interval= 365  ms   QTcB= 429  ms   QRS Axis= -18  deg   T Wave Axis= 19  deg   - OTHERWISE NORMAL ECG -   Sinus rhythm   Borderline left axis deviation   Low voltage, precordial leads   PACs have resolved   Electronically Signed By: Vandana Padgett ([facility]) 06-May-2024 17:30:46   Date and Time of Study: 2024-05-06 15:51:30          Meds given in ED:   Medications   sodium chloride 0.9 % flush 10 mL (has no administration in time range)   ipratropium-albuterol (DUO-NEB) nebulizer solution 3 mL (3 mL Nebulization Given 5/6/24 1748)   methylPREDNISolone sodium succinate  (SOLU-Medrol) injection 125 mg (125 mg Intravenous Given 5/6/24 1167)       Imaging results:  No radiology results for the last day    Ambulatory status:   - up with assistance    Social issues:   Social History     Socioeconomic History    Marital status:    Tobacco Use    Smoking status: Never    Smokeless tobacco: Never    Tobacco comments:     caffine use   Vaping Use    Vaping status: Never Used   Substance and Sexual Activity    Alcohol use: Yes     Alcohol/week: 5.0 standard drinks of alcohol     Types: 5 Glasses of wine per week     Comment: daily  4 ounce wine    Drug use: No    Sexual activity: Defer       Peripheral Neurovascular  Peripheral Neurovascular (Adult)  Peripheral Neurovascular WDL: WDL    Neuro Cognitive  Neuro Cognitive (Adult)  Cognitive/Neuro/Behavioral WDL: WDL    Learning  Learning Assessment (Adult)  Learning Readiness and Ability: no barriers identified  Education Provided  Person Taught: patient, family member/friend    Respiratory  Respiratory WDL  Respiratory WDL: .WDL except, rhythm/pattern, cough  Rhythm/Pattern, Respiratory: shortness of breath, labored  Cough Frequency: frequent  Cough Type: weak, productive, congested  Breath Sounds  Breath Sounds: All Fields  All Lung Fields Breath Sounds: Anterior:, Lateral:, wheezes, expiratory  Breath Sounds Post-Respiratory Treatment  Breath Sounds Posttreatment All Fields: All Fields  Breath Sounds Posttreatment All Fields: no change    Abdominal Pain       Pain Assessments  Pain (Adult)  (0-10) Pain Rating: Rest: 0  (0-10) Pain Rating: Activity: 0    NIH Stroke Scale       Joselyn Diaz RN  05/06/24 18:30 EDT

## 2024-05-07 PROBLEM — D50.9 IRON DEFICIENCY ANEMIA: Status: ACTIVE | Noted: 2024-05-07

## 2024-05-07 PROBLEM — B34.8 RHINOVIRUS: Status: ACTIVE | Noted: 2024-05-07

## 2024-05-07 LAB
ANION GAP SERPL CALCULATED.3IONS-SCNC: 9 MMOL/L (ref 5–15)
BUN SERPL-MCNC: 19 MG/DL (ref 8–23)
BUN/CREAT SERPL: 15.6 (ref 7–25)
CALCIUM SPEC-SCNC: 8.3 MG/DL (ref 8.2–9.6)
CHLORIDE SERPL-SCNC: 105 MMOL/L (ref 98–107)
CO2 SERPL-SCNC: 21 MMOL/L (ref 22–29)
CREAT SERPL-MCNC: 1.22 MG/DL (ref 0.57–1)
DEPRECATED RDW RBC AUTO: 46.3 FL (ref 37–54)
EGFRCR SERPLBLD CKD-EPI 2021: 40.7 ML/MIN/1.73
ERYTHROCYTE [DISTWIDTH] IN BLOOD BY AUTOMATED COUNT: 13.1 % (ref 12.3–15.4)
FERRITIN SERPL-MCNC: 94.7 NG/ML (ref 13–150)
FOLATE SERPL-MCNC: >20 NG/ML (ref 4.78–24.2)
GLUCOSE SERPL-MCNC: 177 MG/DL (ref 65–99)
HCT VFR BLD AUTO: 24.2 % (ref 34–46.6)
HGB BLD-MCNC: 7.9 G/DL (ref 12–15.9)
IRON 24H UR-MRATE: 36 MCG/DL (ref 37–145)
IRON SATN MFR SERPL: 11 % (ref 20–50)
MCH RBC QN AUTO: 31.3 PG (ref 26.6–33)
MCHC RBC AUTO-ENTMCNC: 32.6 G/DL (ref 31.5–35.7)
MCV RBC AUTO: 96 FL (ref 79–97)
PLATELET # BLD AUTO: 268 10*3/MM3 (ref 140–450)
PMV BLD AUTO: 10.2 FL (ref 6–12)
POTASSIUM SERPL-SCNC: 4.4 MMOL/L (ref 3.5–5.2)
RBC # BLD AUTO: 2.52 10*6/MM3 (ref 3.77–5.28)
SODIUM SERPL-SCNC: 135 MMOL/L (ref 136–145)
TIBC SERPL-MCNC: 329 MCG/DL (ref 298–536)
TRANSFERRIN SERPL-MCNC: 221 MG/DL (ref 200–360)
VIT B12 BLD-MCNC: 1019 PG/ML (ref 211–946)
WBC NRBC COR # BLD AUTO: 4.86 10*3/MM3 (ref 3.4–10.8)

## 2024-05-07 PROCEDURE — 82607 VITAMIN B-12: CPT | Performed by: INTERNAL MEDICINE

## 2024-05-07 PROCEDURE — 82728 ASSAY OF FERRITIN: CPT | Performed by: INTERNAL MEDICINE

## 2024-05-07 PROCEDURE — 94664 DEMO&/EVAL PT USE INHALER: CPT

## 2024-05-07 PROCEDURE — 80048 BASIC METABOLIC PNL TOTAL CA: CPT | Performed by: INTERNAL MEDICINE

## 2024-05-07 PROCEDURE — 85027 COMPLETE CBC AUTOMATED: CPT | Performed by: INTERNAL MEDICINE

## 2024-05-07 PROCEDURE — 94799 UNLISTED PULMONARY SVC/PX: CPT

## 2024-05-07 PROCEDURE — 83540 ASSAY OF IRON: CPT | Performed by: INTERNAL MEDICINE

## 2024-05-07 PROCEDURE — G0378 HOSPITAL OBSERVATION PER HR: HCPCS

## 2024-05-07 PROCEDURE — 36415 COLL VENOUS BLD VENIPUNCTURE: CPT | Performed by: INTERNAL MEDICINE

## 2024-05-07 PROCEDURE — 84466 ASSAY OF TRANSFERRIN: CPT | Performed by: INTERNAL MEDICINE

## 2024-05-07 PROCEDURE — 82746 ASSAY OF FOLIC ACID SERUM: CPT | Performed by: INTERNAL MEDICINE

## 2024-05-07 PROCEDURE — 25010000002 METHYLPREDNISOLONE PER 40 MG: Performed by: INTERNAL MEDICINE

## 2024-05-07 PROCEDURE — 94760 N-INVAS EAR/PLS OXIMETRY 1: CPT

## 2024-05-07 PROCEDURE — 94761 N-INVAS EAR/PLS OXIMETRY MLT: CPT

## 2024-05-07 PROCEDURE — 97162 PT EVAL MOD COMPLEX 30 MIN: CPT

## 2024-05-07 PROCEDURE — 99214 OFFICE O/P EST MOD 30 MIN: CPT | Performed by: PHYSICIAN ASSISTANT

## 2024-05-07 PROCEDURE — 97110 THERAPEUTIC EXERCISES: CPT

## 2024-05-07 PROCEDURE — 96376 TX/PRO/DX INJ SAME DRUG ADON: CPT

## 2024-05-07 RX ORDER — PANTOPRAZOLE SODIUM 40 MG/1
40 TABLET, DELAYED RELEASE ORAL
Status: DISCONTINUED | OUTPATIENT
Start: 2024-05-07 | End: 2024-05-08 | Stop reason: HOSPADM

## 2024-05-07 RX ADMIN — IPRATROPIUM BROMIDE AND ALBUTEROL SULFATE 3 ML: .5; 3 SOLUTION RESPIRATORY (INHALATION) at 14:18

## 2024-05-07 RX ADMIN — ASPIRIN 81 MG: 81 TABLET, COATED ORAL at 09:32

## 2024-05-07 RX ADMIN — GUAIFENESIN 600 MG: 600 TABLET, EXTENDED RELEASE ORAL at 09:29

## 2024-05-07 RX ADMIN — GUAIFENESIN 600 MG: 600 TABLET, EXTENDED RELEASE ORAL at 20:51

## 2024-05-07 RX ADMIN — Medication 1 TABLET: at 09:24

## 2024-05-07 RX ADMIN — METHYLPREDNISOLONE SODIUM SUCCINATE 40 MG: 40 INJECTION, POWDER, FOR SOLUTION INTRAMUSCULAR; INTRAVENOUS at 05:08

## 2024-05-07 RX ADMIN — FERROUS SULFATE TAB 325 MG (65 MG ELEMENTAL FE) 325 MG: 325 (65 FE) TAB at 09:24

## 2024-05-07 RX ADMIN — IPRATROPIUM BROMIDE AND ALBUTEROL SULFATE 3 ML: .5; 3 SOLUTION RESPIRATORY (INHALATION) at 20:27

## 2024-05-07 RX ADMIN — Medication 1000 UNITS: at 12:33

## 2024-05-07 RX ADMIN — BUDESONIDE AND FORMOTEROL FUMARATE DIHYDRATE 2 PUFF: 160; 4.5 AEROSOL RESPIRATORY (INHALATION) at 06:51

## 2024-05-07 RX ADMIN — PANTOPRAZOLE SODIUM 40 MG: 40 TABLET, DELAYED RELEASE ORAL at 12:37

## 2024-05-07 RX ADMIN — IPRATROPIUM BROMIDE AND ALBUTEROL SULFATE 3 ML: .5; 3 SOLUTION RESPIRATORY (INHALATION) at 06:44

## 2024-05-07 RX ADMIN — BUDESONIDE AND FORMOTEROL FUMARATE DIHYDRATE 2 PUFF: 160; 4.5 AEROSOL RESPIRATORY (INHALATION) at 20:30

## 2024-05-07 RX ADMIN — METHYLPREDNISOLONE SODIUM SUCCINATE 40 MG: 40 INJECTION, POWDER, FOR SOLUTION INTRAMUSCULAR; INTRAVENOUS at 20:51

## 2024-05-07 RX ADMIN — Medication 500 MG: at 09:24

## 2024-05-07 RX ADMIN — METHYLPREDNISOLONE SODIUM SUCCINATE 40 MG: 40 INJECTION, POWDER, FOR SOLUTION INTRAMUSCULAR; INTRAVENOUS at 12:34

## 2024-05-07 NOTE — CONSULTS
[facility]  Initial Inpatient Consult Note    Referring Provider: A     Reason for Consultation: Anemia    Subjective     History of present illness:      Thank you for allowing us to participate in the care of this patient.  89 y.o. female patient of Dr. Torres admitted with SOA, cough, and congestion for 2 days, positive for human rhinovirus/enterovirus.  Chronic SOA worsening with exertion for several weeks.  Not on home O2.  Hemoglobin 10.7 six months ago now down to 7.2.  Hemoccult positive.  Admits to black stool with oral iron, denies hematochezia.  Not on anticoagulation.  She does admit to frequent heartburn and takes Katie-[place] antiacid and gas several times a day.  Family in room believes her PCP did not want her on PPI due to concerns for side effects.  5/7/2024 CBC with hemoglobin stable from yesterday at 7.9.  Mild iron deficiency with iron 36, TSAT 11%, normal ferritin, transferrin, and TIBC.  Normal B12 and folate.    She denies abdominal pain, nausea, vomiting, melena, hematochezia.    She has never had a colonoscopy.  She has never had an EGD.  She had a cousin who had perforated esophagus following the EGD.  She would like to avoid endoscopies due to this.    Past Medical History:  Past Medical History:   Diagnosis Date    Acute pain of left shoulder due to trauma     Anemia 1950    Aortic valve regurgitation     trace    Aortic valve stenosis     mild    Roajs esophagus 2018    Bilateral carotid artery stenosis     Cancer 1990    some skin cancers    SPENCER (dyspnea on exertion)     Esophageal varices 2018    Fall     going up the steps    GERD (gastroesophageal reflux disease) 2018    Heart murmur     Mitral valve regurgitation     mild to moderate    Pulmonary valve regurgitation     trace    Syncope 04/19/2023    Tricuspid valve regurgitation     mild to moderate    Tuberculosis      Past Surgical History:  Past Surgical History:   Procedure Laterality Date     CATARACT EXTRACTION Bilateral     HYSTERECTOMY      TONSILLECTOMY AND ADENOIDECTOMY      ZENKERS DIVERTICULECTOMY N/A 3/31/2022    Procedure: ZENKERS DIVERTICULECTOMY WITH MYOTOMY, ESOPHAGOSCOPY;  Surgeon: Chilango Louis III, MD;  Location: [facility] OR;  Service: Thoracic;  Laterality: N/A;      Social History:   Social History     Tobacco Use    Smoking status: Never    Smokeless tobacco: Never    Tobacco comments:     caffine use   Substance Use Topics    Alcohol use: Yes     Alcohol/week: 5.0 standard drinks of alcohol     Types: 5 Glasses of wine per week     Comment: 4 ounces egg nog daily, occ sandrine      Family History:  Family History   Problem Relation Age of Onset    Cancer Mother         bladder    Heart attack Father     Heart disease Father     Sudden death Father     Leukemia Father     Heart attack Brother     Heart disease Brother     Sudden death Brother     Cancer Son        Home Meds:  Medications Prior to Admission   Medication Sig Dispense Refill Last Dose    albuterol (PROVENTIL) (2.5 MG/3ML) 0.083% nebulizer solution Take 2.5 mg by nebulization 2 (Two) Times a Day.   5/6/2024    aspirin 81 MG EC tablet Take 1 tablet by mouth Daily.   5/6/2024    B Complex Vitamins (vitamin b complex) capsule capsule Take 1 capsule by mouth Daily.   5/6/2024    Calcium Carbonate 1500 (600 Ca) MG tablet Take 1 tablet by mouth Daily.   5/6/2024    cycloSPORINE (RESTASIS) 0.05 % ophthalmic emulsion Administer 1 drop to both eyes 2 (Two) Times a Day.   5/6/2024    Dextromethorphan-guaiFENesin (MUCINEX DM PO) Take  by mouth Daily.   5/6/2024    ferrous sulfate 325 (65 FE) MG tablet Take 1 tablet by mouth Daily With Breakfast.   5/6/2024    Fluticasone-Salmeterol (Advair Diskus) 250-50 MCG/ACT DISKUS Inhale 2 (Two) Times a Day.   5/6/2024    Misc Natural Products (OSTEO BI-FLEX ADV TRIPLE ST PO) Take 1 tablet by mouth Daily.   5/6/2024    MULTIPLE VITAMIN PO Take 1 tablet by mouth Daily.   5/6/2024    Polyvinyl  Alcohol-Povidone PF (HYPOTEARS) 1.4-0.6 % ophthalmic solution 1-2 drops As Needed (dry eyes).   5/6/2024    vitamin C (ASCORBIC ACID) 250 MG tablet Take 4 tablets by mouth Daily.   5/6/2024    Vitamin D, Cholecalciferol, 1000 units capsule Take 1 capsule by mouth Daily.   5/6/2024    cetirizine (zyrTEC) 10 MG tablet Take 1 tablet by mouth Daily.       doxycycline (MONODOX) 50 MG capsule Take 1 capsule by mouth 2 (Two) Times a Day.       Omega-3 Fatty Acids (fish oil) 1000 MG capsule capsule Take 1 capsule by mouth 2 (Two) Times a Day With Meals.       Polyethyl Glycol-Propyl Glycol (Systane) 0.4-0.3 % gel Apply 1 Application to eye(s) as directed by provider Every Night.        Current Meds:   aspirin, 81 mg, Oral, Daily  budesonide-formoterol, 2 puff, Inhalation, BID - RT  calcium carbonate (oyster shell), 500 mg, Oral, Daily  cholecalciferol, 1,000 Units, Oral, Daily  ferrous sulfate, 325 mg, Oral, Daily With Breakfast  guaiFENesin, 600 mg, Oral, Q12H  ipratropium-albuterol, 3 mL, Nebulization, Q6H While Awake - RT  methylPREDNISolone sodium succinate, 40 mg, Intravenous, Q8H  multivitamin, 1 tablet, Oral, Daily  pantoprazole, 40 mg, Oral, QAM AC      Allergies:  Allergies   Allergen Reactions    Molds & Smuts Shortness Of Breath    Penicillins Hives    Tessalon [Benzonatate] Delirium    Oyster Shell Diarrhea         Objective     Vital Signs  Temp:  [97.7 °F (36.5 °C)-98.4 °F (36.9 °C)] 98.2 °F (36.8 °C)  Heart Rate:  [] 83  Resp:  [16-22] 16  BP: (121-167)/(48-82) 121/66  Physical Exam:   Constitutional:    Alert, cooperative, in no acute distress    Eyes:          conjunctivae and sclerae normal, no icterus    HENT:   Atraumatic, normal hearing, mucosa moist    Neck:   Trachea midline,    Lungs:     normal respiratory effort    Abdomen:     Soft, nondistended, nontender; no organomegaly   Skin:   No bruising, rash, or jaundice   Neurologic:  No tremors,    Psychiatric:  normal mood and affect;       Results Review:   I reviewed the patient's new clinical results.    Results from last 7 days   Lab Units 05/07/24  0606 05/06/24  1646   WBC 10*3/mm3 4.86 7.92   HEMOGLOBIN g/dL 7.9* 7.2*   HEMATOCRIT % 24.2* 21.4*   PLATELETS 10*3/mm3 268 237     Results from last 7 days   Lab Units 05/07/24  0606 05/06/24  1646   SODIUM mmol/L 135* 136   POTASSIUM mmol/L 4.4 4.5   CHLORIDE mmol/L 105 103   CO2 mmol/L 21.0* 24.7   BUN mg/dL 19 23   CREATININE mg/dL 1.22* 1.48*   CALCIUM mg/dL 8.3 8.3   BILIRUBIN mg/dL  --  <0.2   ALK PHOS U/L  --  100   ALT (SGPT) U/L  --  16   AST (SGOT) U/L  --  23   GLUCOSE mg/dL 177* 106*         Lab Results   Lab Value Date/Time    LIPASE 9 (L) 03/25/2022 1845    LIPASE 52 12/27/2019 1130       Radiology:  XR Chest 1 View   Final Result          Assessment & Plan   Active Hospital Problems    Diagnosis     **Acute bronchitis        Assessment:  Acute on chronic anemia with mild iron deficiency  Positive FOBT  Dyspepsia  Respiratory symptoms with positive human rhinovirus/enterovirus  Chronic renal insufficiency    Plan:  Admitted with respiratory symptoms, positive for human rhinovirus/enterovirus but also noted to have 3 g drop in baseline hemoglobin.  Positive FOBT.  Black stools with oral iron but no red blood in stool and denies tarry stools.  She does have significant dyspepsia only treated with over-the-counter Katie-[place].  Recommend starting pantoprazole 40 mg daily to improve this and cover for ulcer.  He will continue this for 3 months and then wean off if doing well.  She is not interested in endoscopies given age, health status, and risks.  Anemia likely multifactorial with baseline hemoglobin around 10.  Likely component of anemia of chronic disease, age-related normocytic anemia, with mild iron deficiency.  Would recommend following hemoglobin and for evidence of overt GI bleed.  Continue oral iron.  She does have some chronic renal insufficiency with creatinine around 1.2,  and eGFR around 40.  Could consider CT scan but ideally would need to be with contrast.  Consider oral contrast without IV.  As long as hemoglobin stabilizes and no overt evidence of GI bleed, will hold off on further workup at this time.  Monitor H&H and for overt GI bleed, transfuse her primary hospital team.  Diet as tolerated.      I discussed the patients findings and my recommendations with patient and family.    Dragon dictation used throughout this note.            Jessica Langley PA-C  [facility]  [address] [place] [address]  Office: [phone]

## 2024-05-07 NOTE — THERAPY EVALUATION
Patient Name: Shaina Mcknight  : 10/4/1927    MRN: [id number]                              Today's Date: 2024       Admit Date: 2024    Visit Dx:     ICD-10-CM ICD-9-CM   1. Symptomatic anemia  D64.9 285.9   2. Acute bronchitis, unspecified organism  J20.9 466.0   3. Acute on chronic congestive heart failure, unspecified heart failure type  I50.9 428.0   4. JACLYN (acute kidney injury)  N17.9 584.9   5. Nonrheumatic aortic valve stenosis  I35.0 424.1   6. Rhinovirus  B34.8 079.3     Patient Active Problem List   Diagnosis    TB (tuberculosis)    Dyspnea on exertion    Nonrheumatic aortic valve stenosis    Community acquired pneumonia of right lower lobe of lung    Leukocytosis    Bacterial pneumonia    Sepsis    Esophageal dysphagia    Rojas esophagus    Anemia    Zenker's diverticulum    Elevated blood pressure reading without diagnosis of hypertension    Bilateral carotid artery stenosis    First degree AV block    Senile osteoporosis    Syncope    Paroxysmal SVT (supraventricular tachycardia)    Acute bronchitis     Past Medical History:   Diagnosis Date    Acute pain of left shoulder due to trauma     Anemia 1950    Aortic valve regurgitation     trace    Aortic valve stenosis     mild    Rojas esophagus 2018    Bilateral carotid artery stenosis     Cancer 1990    some skin cancers    SPENCER (dyspnea on exertion)     Esophageal varices 2018    Fall     going up the steps    GERD (gastroesophageal reflux disease) 2018    Heart murmur     Mitral valve regurgitation     mild to moderate    Pulmonary valve regurgitation     trace    Syncope 2023    Tricuspid valve regurgitation     mild to moderate    Tuberculosis      Past Surgical History:   Procedure Laterality Date    CATARACT EXTRACTION Bilateral     HYSTERECTOMY      TONSILLECTOMY AND ADENOIDECTOMY      ZENKERS DIVERTICULECTOMY N/A 3/31/2022    Procedure: ZENKERS DIVERTICULECTOMY WITH MYOTOMY, ESOPHAGOSCOPY;  Surgeon: Chilango Louis III,  MD;  Location: [facility] MAIN OR;  Service: Thoracic;  Laterality: N/A;      General Information       Row Name 05/07/24 1304          Physical Therapy Time and Intention    Document Type evaluation  -AR     Mode of Treatment physical therapy  -AR       Row Name 05/07/24 1304          General Information    Patient Profile Reviewed yes  -AR     Prior Level of Function min assist:  son lives w/ pt, dtr assists daily, no falls, uses cane when going to [employer] (daily)  -AR     Existing Precautions/Restrictions fall  -AR     Barriers to Rehab none identified  -AR       Row Name 05/07/24 1304          Living Environment    People in Home child(mae), adult  -AR       Row Name 05/07/24 1304          Home Main Entrance    Number of Stairs, Main Entrance three  -AR     Stair Railings, Main Entrance railings safe and in good condition  -AR       Row Name 05/07/24 1304          Cognition    Orientation Status (Cognition) oriented x 3  -AR       Row Name 05/07/24 1304          Safety Issues, Functional Mobility    Impairments Affecting Function (Mobility) endurance/activity tolerance;shortness of breath;strength  -AR               User Key  (r) = Recorded By, (t) = Taken By, (c) = Cosigned By      Initials Name Provider Type    AR Jeanna Bui, PT Physical Therapist                   Mobility       Row Name 05/07/24 1305          Bed Mobility    Bed Mobility supine-sit;sit-supine  -AR     Supine-Sit [place] (Bed Mobility) not tested  -AR     Sit-Supine [place] (Bed Mobility) modified independence  -AR     Assistive Device (Bed Mobility) bed rails  -AR       Row Name 05/07/24 1305          Sit-Stand Transfer    Sit-Stand [place] (Transfers) standby assist  -AR     Comment, (Sit-Stand Transfer) from toilet and bed  -AR       Row Name 05/07/24 1305          Gait/Stairs (Locomotion)    [place] Level (Gait) contact guard  -AR     Assistive Device (Gait) --  no AD  -AR     Distance in Feet (Gait) 200  -AR      Deviations/Abnormal Patterns (Gait) gait speed decreased;rafael decreased  -AR     Bilateral Gait Deviations forward flexed posture  -AR               User Key  (r) = Recorded By, (t) = Taken By, (c) = Cosigned By      Initials Name Provider Type    AR Jeanna Bui, PT Physical Therapist                   Obj/Interventions       Row Name 05/07/24 1306          Range of Motion Comprehensive    Comment, General Range of Motion B LE WFL  -AR       Row Name 05/07/24 1306          Strength Comprehensive (MMT)    Comment, General Manual Muscle Testing (MMT) Assessment B LE 4/5  -AR       Row Name 05/07/24 1306          Motor Skills    Therapeutic Exercise --  LAQ w/ hamstring/calf stretch, 10x  -AR       Row Name 05/07/24 1306          Balance    Balance Assessment standing dynamic balance  -AR     Dynamic Standing Balance contact guard  -AR               User Key  (r) = Recorded By, (t) = Taken By, (c) = Cosigned By      Initials Name Provider Type    Jeanna Baltazar, PT Physical Therapist                   Goals/Plan    No documentation.                  Clinical Impression       Row Name 05/07/24 1306          Pain    Pretreatment Pain Rating 0/10 - no pain  -AR     Posttreatment Pain Rating 0/10 - no pain  -AR     Pain Intervention(s) Repositioned  -AR       Row Name 05/07/24 1306          Plan of Care Review    Plan of Care Reviewed With patient;daughter  -AR     Outcome Evaluation Pt admitted from  home with acute bronichitis, +rhinovirus.  Pt reports son lives  w/ pt, no falls, uses cane when leaving the house, dtr lives close and assists pt daily as needed.   Pt leaves home and goes to [employer] daily w/ dtr.  Today pt able to ambulate 200' w/ contact assist, no UE support.  Slow gait speed but no  loss of balance or safety concerns.  Pt has been ambulating inroom w/ dtr.  Recommend sitting inchair for meals and walking in dominguez as tolerated.  Recommend DC to home with assist as needed.  -AR       Row Name  05/07/24 1306          Therapy Assessment/Plan (PT)    Criteria for Skilled Interventions Met (PT) no;no problems identified which require skilled intervention;does not meet criteria for skilled intervention  -AR     Therapy Frequency (PT) evaluation only  -AR       Row Name 05/07/24 1306          Vital Signs    O2 Delivery Pre Treatment room air  -AR       Row Name 05/07/24 1306          Positioning and Restraints    Pre-Treatment Position bathroom  -AR     Post Treatment Position bed  -AR     In Bed supine;call light within reach;encouraged to call for assist;with family/caregiver  no alarm,nursing aware, dtr present  -AR               User Key  (r) = Recorded By, (t) = Taken By, (c) = Cosigned By      Initials Name Provider Type    AR Jeanna Bui PT Physical Therapist                   Outcome Measures       Row Name 05/07/24 1309          How much help from another person do you currently need...    Turning from your back to your side while in flat bed without using bedrails? 4  -AR     Moving from lying on back to sitting on the side of a flat bed without bedrails? 4  -AR     Moving to and from a bed to a chair (including a wheelchair)? 4  -AR     Standing up from a chair using your arms (e.g., wheelchair, bedside chair)? 4  -AR     Climbing 3-5 steps with a railing? 3  -AR     To walk in hospital room? 3  -AR     AM-PAC 6 Clicks Score (PT) 22  -AR     Highest Level of Mobility Goal 7 --> Walk 25 feet or more  -AR       Row Name 05/07/24 1309          Functional Assessment    Outcome Measure Options AM-PAC 6 Clicks Basic Mobility (PT)  -AR               User Key  (r) = Recorded By, (t) = Taken By, (c) = Cosigned By      Initials Name Provider Type    Jeanna Baltazar, PT Physical Therapist                                 Physical Therapy Education       Title: PT OT SLP Therapies (Done)       Topic: Physical Therapy (Done)       Point: Mobility training (Done)       Learning Progress Summary              Patient Acceptance, E, VU by AR at 5/7/2024 1310   Family Acceptance, E, VU by AR at 5/7/2024 1310                         Point: Home exercise program (Done)       Learning Progress Summary             Patient Acceptance, E, VU by AR at 5/7/2024 1310   Family Acceptance, E, VU by AR at 5/7/2024 1310                         Point: Body mechanics (Done)       Learning Progress Summary             Patient Acceptance, E, VU by AR at 5/7/2024 1310   Family Acceptance, E, VU by AR at 5/7/2024 1310                         Point: Precautions (Done)       Learning Progress Summary             Patient Acceptance, E, VU by AR at 5/7/2024 1310   Family Acceptance, E, VU by AR at 5/7/2024 1310                                         User Key       Initials Effective Dates Name Provider Type Discipline    AR 06/16/21 -  Jeanna Bui PT Physical Therapist PT                  PT Recommendation and Plan     Plan of Care Reviewed With: patient, daughter  Outcome Evaluation: Pt admitted from  home with acute bronichitis, +rhinovirus.  Pt reports son lives  w/ pt, no falls, uses cane when leaving the house, dtr lives close and assists pt daily as needed.   Pt leaves home and goes to [employer] daily w/ dtr.  Today pt able to ambulate 200' w/ contact assist, no UE support.  Slow gait speed but no  loss of balance or safety concerns.  Pt has been ambulating inroom w/ dtr.  Recommend sitting inchair for meals and walking in [place] as tolerated.  Recommend DC to home with assist as needed.     Time Calculation:         PT Charges       Row Name 05/07/24 1303             Time Calculation    Start Time 0938  -AR      Stop Time 1005  -AR      Time Calculation (min) 27 min  -AR      PT Received On 05/07/24  -AR                User Key  (r) = Recorded By, (t) = Taken By, (c) = Cosigned By      Initials Name Provider Type    AR Jeanna Bui PT Physical Therapist                  Therapy Charges for Today       Code Description Service  Date Service Provider Modifiers Qty    61376150105 HC PT EVAL MOD COMPLEXITY 3 5/7/2024 Jeanna Bui, PT GP 1    02137341420 HC PT THER PROC EA 15 MIN 5/7/2024 Jeanna Bui, PT GP 1            PT G-Codes  Outcome Measure Options: AM-PAC 6 Clicks Basic Mobility (PT)  AM-PAC 6 Clicks Score (PT): 22  PT Discharge Summary  Anticipated Discharge Disposition (PT): home with assist    Jeanna Bui PT  5/7/2024

## 2024-05-07 NOTE — H&P
HISTORY AND PHYSICAL   [facility]        Date of Admission: 2024  Patient Identification:  Name: Shaina Mcknight  Age: 89 y.o.  Sex: female  :  10/4/1927  MRN: [id number]                     Primary Care Physician: Fanta Shankar MD    Chief Complaint:  89 year old female presented to the emergency room with shortness of breath, cough and wheezing which started several days ago but got much worse over the last two days; she lives with a son who had similar symptoms last week; no fever or chills;     History of Present Illness:   As above    Past Medical History:  Past Medical History:   Diagnosis Date    Acute pain of left shoulder due to trauma     Anemia     Aortic valve regurgitation     trace    Aortic valve stenosis     mild    Rojas esophagus     Bilateral carotid artery stenosis     Cancer     some skin cancers    SPENCER (dyspnea on exertion)     Esophageal varices     Fall     going up the steps    GERD (gastroesophageal reflux disease) 2018    Heart murmur     Mitral valve regurgitation     mild to moderate    Pulmonary valve regurgitation     trace    Syncope 2023    Tricuspid valve regurgitation     mild to moderate     Past Surgical History:  Past Surgical History:   Procedure Laterality Date    CATARACT EXTRACTION Bilateral     HYSTERECTOMY      TONSILLECTOMY AND ADENOIDECTOMY      ZENKERS DIVERTICULECTOMY N/A 3/31/2022    Procedure: ZENKERS DIVERTICULECTOMY WITH MYOTOMY, ESOPHAGOSCOPY;  Surgeon: Chilango Louis III, MD;  Location: [facility];  Service: Thoracic;  Laterality: N/A;      Home Meds:  Medications Prior to Admission   Medication Sig Dispense Refill Last Dose    albuterol (PROVENTIL) (2.5 MG/3ML) 0.083% nebulizer solution Take 2.5 mg by nebulization 2 (Two) Times a Day.   2024    aspirin 81 MG EC tablet Take 1 tablet by mouth Daily.   2024    B Complex Vitamins (vitamin b complex) capsule capsule Take 1 capsule by mouth Daily.    5/6/2024    Calcium Carbonate 1500 (600 Ca) MG tablet Take 1 tablet by mouth Daily.   5/6/2024    Dextromethorphan-guaiFENesin (MUCINEX DM PO) Take  by mouth Daily.   5/6/2024    ferrous sulfate 325 (65 FE) MG tablet Take 1 tablet by mouth Daily With Breakfast.   5/6/2024    Fluticasone-Salmeterol (Advair Diskus) 250-50 MCG/ACT DISKUS Inhale 2 (Two) Times a Day.   5/6/2024    Misc Natural Products (OSTEO BI-FLEX ADV TRIPLE ST PO) Take 1 tablet by mouth Daily.   5/6/2024    MULTIPLE VITAMIN PO Take 1 tablet by mouth Daily.   5/6/2024    Vitamin D, Cholecalciferol, 1000 units capsule Take 1 capsule by mouth Daily.   5/6/2024       Allergies:  Allergies   Allergen Reactions    Molds & Smuts Shortness Of Breath    Penicillins Hives    Tessalon [Benzonatate] Delirium    Oyster Shell Diarrhea     Immunizations:  Immunization History   Administered Date(s) Administered    COVID-19 (PFIZER) Purple Cap Monovalent 03/21/2021, 04/11/2021, 10/17/2021    Fluzone High Dose =>65 Years ([employer] ONLY) 09/25/2019, 09/12/2020    Fluzone High-Dose 65+yrs 11/09/2021    Pneumococcal Polysaccharide (PPSV23) 11/29/2018     Social History:   Social History     Social History Narrative    Not on file     Social History     Socioeconomic History    Marital status:    Tobacco Use    Smoking status: Never    Smokeless tobacco: Never    Tobacco comments:     caffine use   Vaping Use    Vaping status: Never Used   Substance and Sexual Activity    Alcohol use: Yes     Alcohol/week: 5.0 standard drinks of alcohol     Types: 5 Glasses of wine per week     Comment: daily  4 ounce wine    Drug use: No    Sexual activity: Defer       Family History:  Family History   Problem Relation Age of Onset    Cancer Mother         bladder    Heart attack Father     Heart disease Father     Sudden death Father     Leukemia Father     Heart attack Brother     Heart disease Brother     Sudden death Brother     Cancer Son         Review of Systems  See  "history of present illness and past medical history.  Patient denies headache, dizziness, syncope, falls, trauma, change in vision, change in hearing, change in taste, changes in weight, changes in appetite, focal weakness, numbness, or paresthesia.  Patient denies chest pain,orthopnea, PND,   sinus pressure, rhinorrhea, epistaxis, hemoptysis, nausea, vomiting,hematemesis, diarrhea, constipation or hematochezia.  Denies cold or heat intolerance, polydipsia, polyuria, polyphagia. Denies hematuria, pyuria, dysuria, hesitancy, frequency or urgency. Denies consumption of raw and under cooked meats foods or change in water source.  Denies fever, chills, sweats, night sweats.  Denies missing any routine medications. Remainder of ROS is negative.    Objective:  T Max 24 hrs: Temp (24hrs), Av.7 °F (36.5 °C), Min:97.7 °F (36.5 °C), Max:97.7 °F (36.5 °C)    Vitals Ranges:   Temp:  [97.7 °F (36.5 °C)] 97.7 °F (36.5 °C)  Heart Rate:  [] 80  Resp:  [22] 22  BP: (130-167)/(48-76) 167/76      Exam:  /76   Pulse 80   Temp 97.7 °F (36.5 °C) (Tympanic)   Resp 22   Ht 152.4 cm (60\")   Wt 61.2 kg (135 lb)   SpO2 91%   BMI 26.37 kg/m²     General Appearance:    Alert, cooperative, no distress, appears stated age   Head:    Normocephalic, without obvious abnormality, atraumatic   Eyes:    PERRL, conjunctivae/corneas clear, EOM's intact, both eyes   Ears:    Normal external ear canals, both ears   Nose:   Nares normal, septum midline, mucosa normal, no drainage    or sinus tenderness   Throat:   Lips, mucosa, and tongue normal   Neck:   Supple, symmetrical, trachea midline, no adenopathy;     thyroid:  no enlargement/tenderness/nodules; no carotid    bruit or JVD   Back:     Symmetric, no curvature, ROM normal, no CVA tenderness   Lungs:     Wheezes, rhonchi bilaterally, respirations unlabored   Chest Wall:    No tenderness or deformity    Heart:    Regular rate and rhythm, S1 and S2 normal, no murmur, rub   or " gallop   Abdomen:     Soft, nontender, bowel sounds active all four quadrants,     no masses, no hepatomegaly, no splenomegaly   Extremities:   Extremities normal, atraumatic, no cyanosis or edema   Pulses:   2+ and symmetric all extremities   Skin:   Skin color, texture, turgor normal, no rashes or lesions   Lymph nodes:   Cervical, supraclavicular, and axillary nodes normal   Neurologic:   CNII-XII intact, normal strength, sensation intact throughout      .    Data Review:  Labs in chart were reviewed.  WBC   Date Value Ref Range Status   05/06/2024 7.92 3.40 - 10.80 10*3/mm3 Final     Hemoglobin   Date Value Ref Range Status   05/06/2024 7.2 (L) 12.0 - 15.9 g/dL Final     Hematocrit   Date Value Ref Range Status   05/06/2024 21.4 (L) 34.0 - 46.6 % Final     Platelets   Date Value Ref Range Status   05/06/2024 237 140 - 450 10*3/mm3 Final     Sodium   Date Value Ref Range Status   05/06/2024 136 136 - 145 mmol/L Final     Potassium   Date Value Ref Range Status   05/06/2024 4.5 3.5 - 5.2 mmol/L Final     Chloride   Date Value Ref Range Status   05/06/2024 103 98 - 107 mmol/L Final     CO2   Date Value Ref Range Status   05/06/2024 24.7 22.0 - 29.0 mmol/L Final     BUN   Date Value Ref Range Status   05/06/2024 23 8 - 23 mg/dL Final     Creatinine   Date Value Ref Range Status   05/06/2024 1.48 (H) 0.57 - 1.00 mg/dL Final     Glucose   Date Value Ref Range Status   05/06/2024 106 (H) 65 - 99 mg/dL Final     Calcium   Date Value Ref Range Status   05/06/2024 8.3 8.2 - 9.6 mg/dL Final                Imaging Results (All)       Procedure Component Value Units Date/Time    XR Chest 1 View [[id number]] Collected: 05/06/24 1647     Updated: 05/06/24 2003    Narrative:      PORTABLE CHEST     HISTORY: Shortness of air.     COMPARISON: A single portable view of the chest demonstrates the heart  to be at the upper limits of normal in size. The pulmonary interstitium  and vasculature are prominent, more so as compared to  04/19/2023. There  is suprahilar interstitial prominence on the left which is new as  compared to the prior study. Mild bibasilar atelectasis/infiltrate is  appreciated which is also more prominent as compared to the prior  examination. There is no evidence of consolidation or gross effusion.  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     This report was finalized on 5/6/2024 8:00 PM by Dr. Ruben Painter M.D  on Workstation: [id number]                 Assessment:  Active Hospital Problems    Diagnosis  POA    **Acute bronchitis [J20.9]  Yes      Resolved Hospital Problems   No resolved problems to display.   Rhinovirus  Anemia  Barretts esophagus  Hyperglycemia  Hypertension  weakness    Plan:  Continue steroids, mininebs  Ppi  Ask gi to see her  Check iron studies, b12, folate  Monitor on telemetry  Dw patient and ed provider    Kaye Cruz MD  5/6/2024  20:29 EDT

## 2024-05-07 NOTE — PLAN OF CARE
Goal Outcome Evaluation:  Plan of Care Reviewed With: patient, daughter        Progress: no change  Outcome Evaluation: Expiratory wheezes with productive cough, states clear in color.  Did not sleep well due to IV steroid, states Melatonin does not help.  Up with assistance, VSS.  Denies pain.  Safety precautions in place, daughter at bedside

## 2024-05-07 NOTE — PLAN OF CARE
Goal Outcome Evaluation:  Plan of Care Reviewed With: patient, daughter           Outcome Evaluation: Pt admitted from  home with acute bronichitis, +rhinovirus.  Pt reports son lives  w/ pt, no falls, uses cane when leaving the house, dtr lives close and assists pt daily as needed.   Pt leaves home and goes to [facility] daily w/ dtr.  Today pt able to ambulate 200' w/ contact assist, no UE support.  Slow gait speed but no  loss of balance or safety concerns.  Pt has been ambulating inroom w/ dtr.  Recommend sitting inchair for meals and walking in dominguez as tolerated.  Recommend DC to home with assist as needed.      Anticipated Discharge Disposition (PT): home with assist

## 2024-05-07 NOTE — SIGNIFICANT NOTE
05/07/24 1444   OTHER   Discipline occupational therapist   Rehab Time/Intention   Session Not Performed other (see comments)  (Per PT and doc pt is (I) w/ ADLs and fxnl mob w/o AD at baseline but owns AD if needed. She has strong family support, providing assist as needed and plans to dc home soon. OT will s/o at this time. Reorder if anything changes.)   Therapy Assessment/Plan (PT)   Criteria for Skilled Interventions Met (PT) no;no problems identified which require skilled intervention;does not meet criteria for skilled intervention

## 2024-05-07 NOTE — PROGRESS NOTES
Name: Shaina Mcknight ADMIT: 2024   : 10/4/1927  PCP: Fanta Shankar MD    MRN: [id number] LOS: 0 days   AGE/SEX: 89 y.o. female  ROOM: [facility]     Subjective   Subjective   Patient is lying on the bed and does not appear to be in major distress.  States her respiratory status has been improving.  Denies nausea, vomiting abdominal pain, chest pain.       Objective   Objective   Vital Signs  Temp:  [97.7 °F (36.5 °C)-98.4 °F (36.9 °C)] 98.2 °F (36.8 °C)  Heart Rate:  [] 82  Resp:  [16-22] 16  BP: (121-167)/(48-82) 121/66  SpO2:  [91 %-100 %] 100 %  on   ;   Device (Oxygen Therapy): room air  Body mass index is 28.16 kg/m².  Physical Exam  HEENT: PERRLA, extraocular movements intact, Scleras no icterus  Neck: Supple, no JVD  Cardiovascular: Regular rate and rhythm with normal S1 and S2, positive for murmur  Respiratory: Diminished breath sounds with faint wheezes  GI: Soft, nontender, bowel sounds present  Extremities: No edema, palpable pedal pulses  Neurologic: Grossly nonfocal, no facial asymmetry    Results Review     I reviewed the patient's new clinical results.  Results from last 7 days   Lab Units 24  0606 24  1646   WBC 10*3/mm3 4.86 7.92   HEMOGLOBIN g/dL 7.9* 7.2*   PLATELETS 10*3/mm3 268 237     Results from last 7 days   Lab Units 24  0606 24  1646   SODIUM mmol/L 135* 136   POTASSIUM mmol/L 4.4 4.5   CHLORIDE mmol/L 105 103   CO2 mmol/L 21.0* 24.7   BUN mg/dL 19 23   CREATININE mg/dL 1.22* 1.48*   GLUCOSE mg/dL 177* 106*   EGFR mL/min/1.73 40.7* 32.3*     Results from last 7 days   Lab Units 24  1646   ALBUMIN g/dL 3.2*   BILIRUBIN mg/dL <0.2   ALK PHOS U/L 100   AST (SGOT) U/L 23   ALT (SGPT) U/L 16     Results from last 7 days   Lab Units 24  0606 24  1646   CALCIUM mg/dL 8.3 8.3   ALBUMIN g/dL  --  3.2*     Results from last 7 days   Lab Units 24  1646   PROCALCITONIN ng/mL 0.08   LACTATE mmol/L 1.3     No results found for:  "\"HGBA1C\", \"POCGLU\"    No radiology results for the last day    I have personally reviewed all medications:  Scheduled Medications  aspirin, 81 mg, Oral, Daily  budesonide-formoterol, 2 puff, Inhalation, BID - RT  calcium carbonate (oyster shell), 500 mg, Oral, Daily  cholecalciferol, 1,000 Units, Oral, Daily  ferrous sulfate, 325 mg, Oral, Daily With Breakfast  guaiFENesin, 600 mg, Oral, Q12H  ipratropium-albuterol, 3 mL, Nebulization, Q6H While Awake - RT  methylPREDNISolone sodium succinate, 40 mg, Intravenous, Q8H  multivitamin, 1 tablet, Oral, Daily  pantoprazole, 40 mg, Oral, QAM AC    Infusions   Diet  Diet: Cardiac; Healthy Heart (2-3 Na+); Fluid Consistency: Thin (IDDSI 0)    I have personally reviewed:  [x]  Laboratory   [x]  Microbiology   [x]  Radiology   [x]  EKG/Telemetry  [x]  Cardiology/Vascular   []  Pathology    []  Records       Assessment/Plan     Active Hospital Problems    Diagnosis  POA    **Acute bronchitis [J20.9]  Yes      Resolved Hospital Problems   No resolved problems to display.       89 y.o. female admitted with Acute bronchitis.  1.Acute bronchitis secondary to rhinovirus, continue with IV Solu-Medrol as well as Symbicort/DuoNebs.  Improving clinically and currently not requiring any oxygen.  If she continues to do well home tomorrow.  I encouraged her to use incentive spirometry.  2.  Hypertension, currently blood pressure is reasonably well-controlled.  3.  History of Rojas's, on acid suppressive therapy.  4.  Anemia, likely due to iron deficiency and hemoglobin did drop to 7.9 and baseline is around 9-10.  Patient and family does not want to pursue any aggressive treatment at this point.  5.  Elevated troponin, she is close to her baseline numbers and I do not see any need to pursue workup at this point and family/patient declined as well.  BNP is elevated but does not appear to be any volume overload clinically.  Echo done in April 2023 revealed normal ejection fraction with " mild to moderate aortic valve stenosis.  6.  On SCDs for DVT prophylaxis.  7.  CODE STATUS is DNR.  8.  Expected discharge date, 5/8/2024        Justus Oseguera MD  [facility]  05/07/24  14:48 EDT

## 2024-05-07 NOTE — PROGRESS NOTES
Discharge Planning Assessment  [facility]     Patient Name: Shaina Mcknight  MRN: [id number]  Today's Date: 5/7/2024    Admit Date: 5/6/2024    Plan: Return home with family support   Discharge Needs Assessment    No documentation.                  Discharge Plan       Row Name 05/07/24 1606       Plan    Plan Return home with family support    Plan Comments Spoke with patient and daughter Kathleen/-[phone] at bedside  Patient son/Ketan Mcknight  lives in [place] [facility] [place] [facility] [place] with her. Her main  living area on the main floor. Her daughter stated she is independent at home. Her daughter provides her transportation. Has been to [facility] rehab in the past. Discharge plans are to return home with family support. Jaclyn Short RN                  Continued Care and Services - Admitted Since 5/6/2024    No active coordination exists for this encounter.          Demographic Summary       Row Name 05/07/24 155       General Information    Admission Type observation    Arrived From emergency department    Referral Source admission list    Reason for Consult discharge planning    Preferred Language English                   Functional Status       Row Name 05/07/24 1551       Functional Status    Usual Activity Tolerance moderate    Current Activity Tolerance fair       Functional Status, IADL    Medications independent    Meal Preparation assistive person    Housekeeping assistive person    Laundry assistive person    Shopping assistive person                   Psychosocial    No documentation.                  Abuse/Neglect    No documentation.                  Legal    No documentation.                  Substance Abuse    No documentation.                  Patient Forms    No documentation.                     Jaclyn Short RN

## 2024-05-08 VITALS
DIASTOLIC BLOOD PRESSURE: 48 MMHG | BODY MASS INDEX: 28.13 KG/M2 | HEART RATE: 84 BPM | RESPIRATION RATE: 20 BRPM | TEMPERATURE: 97.3 F | SYSTOLIC BLOOD PRESSURE: 133 MMHG | WEIGHT: 143.3 LBS | OXYGEN SATURATION: 92 % | HEIGHT: 60 IN

## 2024-05-08 LAB
HCT VFR BLD AUTO: 24.2 % (ref 34–46.6)
HGB BLD-MCNC: 7.8 G/DL (ref 12–15.9)

## 2024-05-08 PROCEDURE — 96376 TX/PRO/DX INJ SAME DRUG ADON: CPT

## 2024-05-08 PROCEDURE — G0378 HOSPITAL OBSERVATION PER HR: HCPCS

## 2024-05-08 PROCEDURE — 99214 OFFICE O/P EST MOD 30 MIN: CPT | Performed by: PHYSICIAN ASSISTANT

## 2024-05-08 PROCEDURE — 94761 N-INVAS EAR/PLS OXIMETRY MLT: CPT

## 2024-05-08 PROCEDURE — 85018 HEMOGLOBIN: CPT | Performed by: PHYSICIAN ASSISTANT

## 2024-05-08 PROCEDURE — 94664 DEMO&/EVAL PT USE INHALER: CPT

## 2024-05-08 PROCEDURE — 94799 UNLISTED PULMONARY SVC/PX: CPT

## 2024-05-08 PROCEDURE — 85014 HEMATOCRIT: CPT | Performed by: PHYSICIAN ASSISTANT

## 2024-05-08 PROCEDURE — 25010000002 METHYLPREDNISOLONE PER 40 MG: Performed by: INTERNAL MEDICINE

## 2024-05-08 RX ORDER — FLUTICASONE PROPIONATE AND SALMETEROL 250; 50 UG/1; UG/1
1 POWDER RESPIRATORY (INHALATION)
Qty: 60 EACH | Refills: 0 | Status: SHIPPED | OUTPATIENT
Start: 2024-05-08 | End: 2024-06-07

## 2024-05-08 RX ORDER — PREDNISONE 10 MG/1
TABLET ORAL
Qty: 20 TABLET | Refills: 0 | Status: SHIPPED | OUTPATIENT
Start: 2024-05-08 | End: 2024-05-20

## 2024-05-08 RX ORDER — PANTOPRAZOLE SODIUM 40 MG/1
40 TABLET, DELAYED RELEASE ORAL
Qty: 30 TABLET | Refills: 0 | Status: SHIPPED | OUTPATIENT
Start: 2024-05-09 | End: 2024-06-08

## 2024-05-08 RX ORDER — ALBUTEROL SULFATE 2.5 MG/3ML
2.5 SOLUTION RESPIRATORY (INHALATION) EVERY 6 HOURS PRN
Qty: 180 ML | Refills: 1 | Status: SHIPPED | OUTPATIENT
Start: 2024-05-08

## 2024-05-08 RX ADMIN — BUDESONIDE AND FORMOTEROL FUMARATE DIHYDRATE 2 PUFF: 160; 4.5 AEROSOL RESPIRATORY (INHALATION) at 08:16

## 2024-05-08 RX ADMIN — PANTOPRAZOLE SODIUM 40 MG: 40 TABLET, DELAYED RELEASE ORAL at 08:11

## 2024-05-08 RX ADMIN — Medication 1 TABLET: at 09:00

## 2024-05-08 RX ADMIN — GUAIFENESIN 600 MG: 600 TABLET, EXTENDED RELEASE ORAL at 09:00

## 2024-05-08 RX ADMIN — FERROUS SULFATE TAB 325 MG (65 MG ELEMENTAL FE) 325 MG: 325 (65 FE) TAB at 09:00

## 2024-05-08 RX ADMIN — Medication 1000 UNITS: at 09:00

## 2024-05-08 RX ADMIN — ASPIRIN 81 MG: 81 TABLET, COATED ORAL at 09:03

## 2024-05-08 RX ADMIN — METHYLPREDNISOLONE SODIUM SUCCINATE 40 MG: 40 INJECTION, POWDER, FOR SOLUTION INTRAMUSCULAR; INTRAVENOUS at 05:21

## 2024-05-08 RX ADMIN — Medication 500 MG: at 09:00

## 2024-05-08 RX ADMIN — IPRATROPIUM BROMIDE AND ALBUTEROL SULFATE 3 ML: .5; 3 SOLUTION RESPIRATORY (INHALATION) at 08:09

## 2024-05-08 NOTE — PLAN OF CARE
Goal Outcome Evaluation:  Plan of Care Reviewed With: patient, daughter        Progress: improving  Outcome Evaluation: alert, expiratory wheezes throughout.  She states productive cough is improving.  Up to bathroom with cane and SBA.  Room air, receiving IV steroids. No active bleeding noted,    Prob discharge today.  Daughter at bedside

## 2024-05-08 NOTE — DISCHARGE SUMMARY
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        Hospital Course     89 year old female presented to the emergency room with shortness of breath, cough and wheezing which started several days ago but got much worse over the last two days; she lives with a son who had similar symptoms last week; no fever or chills.      1.Acute bronchitis secondary to rhinovirus, was treated with IV Solu-Medrol, Symbicort as well as albuterol nebulizer with which her symptoms have significantly improved and not requiring any oxygen.  Today she denies any dyspnea and able to complete sentences without any difficulty.  Will be placed on a tapered dose of steroids upon discharge and will continue with Advair/albuterol as an outpatient basis.    2.  Hypertension, currently blood pressure is reasonably well-controlled.    3.  History of Rojas's, on acid suppressive therapy.    4.  Anemia, likely due to iron deficiency and hemoglobin did drop to 7.9 and baseline is around 9-10.  There is no evidence of any active bleeding and no reports of any melena or hematochezia.  Patient and family does not want to pursue any aggressive treatment at this point.  GI did evaluate and recommended outpatient follow-up.    5.  Elevated troponin, she is close to her baseline numbers and I do not see any need to pursue workup at this point and family/patient declined as well.  BNP is elevated but does not appear to be any volume overload  clinically.  Echo done in April 2023 revealed normal ejection fraction with mild to moderate aortic valve stenosis.      Copied text on this note has been reviewed by me on 5/8/2024    Day of Discharge         Physical Exam:  Temp:  [97.2 °F (36.2 °C)-98.4 °F (36.9 °C)] 97.3 °F (36.3 °C)  Heart Rate:  [70-90] 84  Resp:  [16-20] 20  BP: (103-133)/(48-75) 133/48  Body mass index is 27.99 kg/m².  Physical Exam  HEENT: PERRLA, extraocular movements intact, Scleras no icterus  Neck: Supple, no JVD  Cardiovascular: Regular rate and rhythm with normal S1 and S2, positive for murmur  Respiratory: Diminished breath sounds with faint wheezes  GI: Soft, nontender, bowel sounds present  Extremities: No edema, palpable pedal pulses  Neurologic: Grossly nonfocal, no facial asymmetry      Consultants     Consult Orders (all) (From admission, onward)       Start     Ordered    05/06/24 2029  Inpatient Gastroenterology Consult  Once        Specialty:  Gastroenterology  Provider:  Evelyn Posey MD    05/06/24 2028 05/06/24 1749  [facility] (on-call MD unless specified) Details  Once        Specialty:  Hospitalist  Provider:  (Not yet assigned)    05/06/24 1748                  Procedures     * Surgery not found *    Imaging Results (All)       Procedure Component Value Units Date/Time    XR Chest 1 View [[id number]] Collected: 05/06/24 1647     Updated: 05/06/24 2003    Narrative:      PORTABLE CHEST     HISTORY: Shortness of air.     COMPARISON: A single portable view of the chest demonstrates the heart  to be at the upper limits of normal in size. The pulmonary interstitium  and vasculature are prominent, more so as compared to 04/19/2023. There  is suprahilar interstitial prominence on the left which is new as  compared to the prior study. Mild bibasilar atelectasis/infiltrate is  appreciated which is also more prominent as compared to the prior  examination. There is no evidence of consolidation or gross effusion.  Severe  degenerative disease involving the right shoulder is noted, only  partially visualized.     This report was finalized on 5/6/2024 8:00 PM by Dr. 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    All other left sided veins appeared normal.    Results for orders placed during the hospital encounter of 04/19/23    Adult Transthoracic Echo Complete W/ Cont if Necessary Per Protocol    Interpretation Summary    Left ventricular systolic function is normal. Calculated left ventricular EF = 64.6%    Left ventricular diastolic function is consistent with age.    Mild to moderate aortic valve stenosis is present. Aortic valve area is 0.9 cm2.    Peak velocity of the flow distal to the aortic valve is 291.8 cm/s. Aortic valve maximum pressure gradient is 34 mmHg. Aortic valve mean pressure gradient is 19 mmHg. Aortic valve dimensionless index is 0.3 .    Estimated right ventricular systolic pressure from tricuspid regurgitation is normal (<35 mmHg).    Pertinent Labs     Results from last 7 days   Lab Units 05/08/24  0920 05/07/24  0606 05/06/24  1646   WBC 10*3/mm3  --  4.86 7.92   HEMOGLOBIN g/dL 7.8* 7.9* 7.2*   PLATELETS 10*3/mm3  --  268 237     Results from last 7 days   Lab Units 05/07/24  0606 05/06/24  1646   SODIUM mmol/L 135* 136   POTASSIUM mmol/L 4.4 4.5   CHLORIDE mmol/L 105 103   CO2 mmol/L 21.0* 24.7   BUN mg/dL 19 23   CREATININE mg/dL 1.22* 1.48*   GLUCOSE mg/dL 177* 106*   EGFR mL/min/1.73 40.7* 32.3*     Results from last 7 days   Lab Units 05/06/24  1646   ALBUMIN g/dL 3.2*   BILIRUBIN mg/dL <0.2   ALK PHOS U/L 100   AST (SGOT) U/L 23   ALT (SGPT) U/L 16     Results from last 7 days   Lab Units 05/07/24  0606 05/06/24  1646   CALCIUM mg/dL 8.3 8.3   ALBUMIN g/dL  --   "3.2*       Results from last 7 days   Lab Units 05/06/24  2244 05/06/24  2038 05/06/24  1646   HSTROP T ng/L 29* 27* 30*   PROBNP pg/mL  --   --  2,475.0*           Invalid input(s): \"LDLCALC\"      Results from last 7 days   Lab Units 05/06/24  1650   COVID19  Not Detected       Test Results Pending at Discharge       Discharge Details        Discharge Medications        New Medications        Instructions Start Date   pantoprazole 40 MG EC tablet  Commonly known as: PROTONIX   40 mg, Oral, Every Morning Before Breakfast   Start Date: May 9, 2024     predniSONE 10 MG tablet  Commonly known as: DELTASONE   10 mg, Oral, Daily, Take 3 tablets daily x 3 days, then 2 tablets daily x 3 days, then 1 tablet daily x 3 days, then half a tablet daily x 3 days             Changes to Medications        Instructions Start Date   albuterol (2.5 MG/3ML) 0.083% nebulizer solution  Commonly known as: PROVENTIL  What changed:   when to take this  reasons to take this   2.5 mg, Nebulization, Every 6 Hours PRN      Fluticasone-Salmeterol 250-50 MCG/ACT DISKUS  Commonly known as: Advair Diskus  What changed: how much to take   1 puff, Inhalation, 2 Times Daily - RT             Continue These Medications        Instructions Start Date   aspirin 81 MG EC tablet   81 mg, Oral, Daily      Calcium Carbonate 1500 (600 Ca) MG tablet   600 mg, Oral, Daily      cetirizine 10 MG tablet  Commonly known as: zyrTEC   10 mg, Oral, Daily      cycloSPORINE 0.05 % ophthalmic emulsion  Commonly known as: RESTASIS   1 drop, Both Eyes, 2 Times Daily      ferrous sulfate 325 (65 FE) MG tablet   325 mg, Oral, Daily With Breakfast      fish oil 1000 MG capsule capsule   1,000 mg, Oral, 2 Times Daily With Meals      MUCINEX DM PO   Oral, Daily      multivitamin tablet tablet  Commonly known as: THERAGRAN   1 tablet, Oral, Daily      OSTEO BI-FLEX ADV TRIPLE ST PO   1 tablet, Oral, Daily      Polyvinyl Alcohol-Povidone PF 1.4-0.6 % ophthalmic solution  Commonly " known as: HYPOTEARS   1-2 drops, As Needed      Systane 0.4-0.3 % gel  Generic drug: Polyethyl Glycol-Propyl Glycol   1 Application, Ophthalmic, Nightly      vitamin b complex capsule capsule   1 capsule, Oral, Daily      vitamin C 250 MG tablet  Commonly known as: ASCORBIC ACID   1,000 mg, Oral, Daily      Vitamin D (Cholecalciferol) 25 MCG (1000 UT) capsule   1 capsule, Oral, Daily             Stop These Medications      doxycycline 50 MG capsule  Commonly known as: MONODOX              Allergies   Allergen Reactions    Molds & Smuts Shortness Of Breath    Penicillins Hives    Tessalon [Benzonatate] Delirium    Oyster Shell Diarrhea       Discharge Disposition:  Home or Self Care      Discharge Diet:  Diet Order   Procedures    Diet: Cardiac; Healthy Heart (2-3 Na+); Fluid Consistency: Thin (IDDSI 0)       Discharge Activity:   Activity Instructions       Activity as Tolerated              CODE STATUS:    Code Status and Medical Interventions:   Ordered at: 05/06/24 2033     Medical Intervention Limits:    NO intubation (DNI)     Code Status (Patient has no pulse and is not breathing):    No CPR (Do Not Attempt to Resuscitate)     Medical Interventions (Patient has pulse or is breathing):    Limited Support       No future appointments.  Additional Instructions for the Follow-ups that You Need to Schedule       Discharge Follow-up with PCP   As directed       Currently Documented PCP:    Fanta Shankar MD    PCP Phone Number:    [phone]     Follow Up Details: 1 week               Follow-up Information       Fanta Shankar MD .    Specialty: Internal Medicine  Why: 1 week  Contact information:  [address] [place] [address]  [phone]                             Additional Instructions for the Follow-ups that You Need to Schedule       Discharge Follow-up with PCP   As directed       Currently Documented PCP:    Fanta Shankar MD    PCP Phone Number:    [phone]     Follow Up  Details: 1 week            Time Spent on Discharge:  Greater than 30 minutes      Justus Oseguera MD  [facility]  05/08/24  11:55 EDT

## 2024-05-08 NOTE — PROGRESS NOTES
[facility]  Inpatient Progress Note    Reason for Follow-up: Anemia    Subjective     Interval History:   Reports feeling very well today.  Sitting up in chair.  Hemoglobin stable at 7.8.    Current Facility-Administered Medications:     acetaminophen (TYLENOL) tablet 650 mg, 650 mg, Oral, Q4H PRN, Kaye Cruz MD    albuterol (PROVENTIL) nebulizer solution 0.083% 2.5 mg/3mL, 2.5 mg, Nebulization, Q6H PRN, Kaye Cruz MD    aspirin EC tablet 81 mg, 81 mg, Oral, Daily, Kaye Cruz MD, 81 mg at 05/08/24 0903    sennosides-docusate (PERICOLACE) 8.6-50 MG per tablet 2 tablet, 2 tablet, Oral, BID PRN **AND** polyethylene glycol (MIRALAX) packet 17 g, 17 g, Oral, Daily PRN **AND** bisacodyl (DULCOLAX) EC tablet 5 mg, 5 mg, Oral, Daily PRN **AND** bisacodyl (DULCOLAX) suppository 10 mg, 10 mg, Rectal, Daily PRN, Kaye Cruz MD    budesonide-formoterol (SYMBICORT) 160-4.5 MCG/ACT inhaler 2 puff, 2 puff, Inhalation, BID - RT, Kaye Cruz MD, 2 puff at 05/08/24 0816    calcium carbonate (oyster shell) tablet 500 mg, 500 mg, Oral, Daily, Kaye Cruz MD, 500 mg at 05/08/24 0900    cholecalciferol (VITAMIN D3) tablet 1,000 Units, 1,000 Units, Oral, Daily, Kaye Cruz MD, 1,000 Units at 05/08/24 0900    ferrous sulfate tablet 325 mg, 325 mg, Oral, Daily With Breakfast, Kaye Cruz MD, 325 mg at 05/08/24 0900    guaiFENesin (MUCINEX) 12 hr tablet 600 mg, 600 mg, Oral, Q12H, Kaye Cruz MD, 600 mg at 05/08/24 0900    ipratropium-albuterol (DUO-NEB) nebulizer solution 3 mL, 3 mL, Nebulization, Q6H While Awake - RT, Kaye Cruz MD, 3 mL at 05/08/24 0809    melatonin tablet 3 mg, 3 mg, Oral, Nightly PRN, Kaye Cruz MD    methylPREDNISolone sodium succinate (SOLU-Medrol) injection 40 mg, 40 mg, Intravenous, Q8H, Kaye Cruz MD, 40 mg at 05/08/24 0521    multivitamin (THERAGRAN)  tablet 1 tablet, 1 tablet, Oral, Daily, Kaye Cruz MD, 1 tablet at 05/08/24 0900    ondansetron ODT (ZOFRAN-ODT) disintegrating tablet 4 mg, 4 mg, Oral, Q6H PRN **OR** ondansetron (ZOFRAN) injection 4 mg, 4 mg, Intravenous, Q6H PRN, Kaye Cruz MD    pantoprazole (PROTONIX) EC tablet 40 mg, 40 mg, Oral, QAM AC, Jessica Langley, MARISSA, 40 mg at 05/08/24 0811    sodium chloride 0.9 % flush 10 mL, 10 mL, Intravenous, PRN, Emergency, Triage Protocol, MD      Objective     Vital Signs  Temp:  [97.2 °F (36.2 °C)-98.4 °F (36.9 °C)] 97.3 °F (36.3 °C)  Heart Rate:  [70-90] 84  Resp:  [16-20] 20  BP: (103-133)/(48-75) 133/48  Body mass index is 27.99 kg/m².    Intake/Output Summary (Last 24 hours) at 5/8/2024 1206  Last data filed at 5/8/2024 0551  Gross per 24 hour   Intake 720 ml   Output --   Net 720 ml     No intake/output data recorded.     Physical Exam:    General: patient awake, alert and cooperative   Eyes: Normal lids and lashes, no scleral icterus   Skin: warm and dry, not jaundiced   Pulm: regular and unlabored   Psychiatric: Normal mood and behavior; memory intact     Results Review:     I reviewed the patient's new clinical results.    Results from last 7 days   Lab Units 05/08/24  0920 05/07/24  0606 05/06/24  1646   WBC 10*3/mm3  --  4.86 7.92   HEMOGLOBIN g/dL 7.8* 7.9* 7.2*   HEMATOCRIT % 24.2* 24.2* 21.4*   PLATELETS 10*3/mm3  --  268 237     Results from last 7 days   Lab Units 05/07/24  0606 05/06/24  1646   SODIUM mmol/L 135* 136   POTASSIUM mmol/L 4.4 4.5   CHLORIDE mmol/L 105 103   CO2 mmol/L 21.0* 24.7   BUN mg/dL 19 23   CREATININE mg/dL 1.22* 1.48*   CALCIUM mg/dL 8.3 8.3   BILIRUBIN mg/dL  --  <0.2   ALK PHOS U/L  --  100   ALT (SGPT) U/L  --  16   AST (SGOT) U/L  --  23   GLUCOSE mg/dL 177* 106*         Lab Results   Lab Value Date/Time    LIPASE 9 (L) 03/25/2022 1845    LIPASE 52 12/27/2019 1130       Radiology:  XR Chest 1 View   Final Result          Assessment & Plan      Active Hospital Problems    Diagnosis     **Acute bronchitis     Rhinovirus     Iron deficiency anemia     Rojas esophagus     Nonrheumatic aortic valve stenosis        Assessment:  Acute on chronic anemia with mild iron deficiency, stable hemoglobin  Positive FOBT  Dyspepsia  Respiratory symptoms with positive human rhinovirus/enterovirus  Chronic renal insufficiency      Plan:  Admitted with respiratory symptoms, positive for human rhinovirus/enterovirus but also noted to have 3 g drop in baseline hemoglobin.  Positive FOBT.  Black stools with oral iron but no red blood in stool and denies tarry stools.  She does have significant dyspepsia only treated with over-the-counter [place].  Recommend starting pantoprazole 40 mg daily to improve this and cover for ulcer.  Recommend continuing this for 3 months total and then wean off if doing well.  She is not interested in endoscopies given age, health status, and risks.  Anemia likely multifactorial with baseline hemoglobin around 10.  Likely component of anemia of chronic disease, age-related normocytic anemia, with mild iron deficiency. Continue oral iron.  She does have some chronic renal insufficiency with creatinine around 1.2, and eGFR around 40.  Could consider CT scan but ideally would need to be with contrast.  Consider oral contrast without IV if persistent issues. Will hold off for now.  Diet as tolerated.  Hemoglobin is stable without overt GI bleed.  Okay for discharge from GI standpoint.  She can follow-up in our office if anemia persists or worsens or evidence of overt GI bleed.  She should follow-up with her PCP.  Continue PPI for 3 months.    I discussed the patients findings and my recommendations with patient and family.    Dragon dictation used throughout this note.            Jessica Langley PA-C  [facility]  [address] [place] [address]  Office: [phone]

## 2024-05-10 LAB
BH BB BLOOD EXPIRATION DATE: NORMAL
BH BB BLOOD EXPIRATION DATE: NORMAL
BH BB BLOOD TYPE BARCODE: 7300
BH BB BLOOD TYPE BARCODE: 7300
BH BB DISPENSE STATUS: NORMAL
BH BB DISPENSE STATUS: NORMAL
BH BB PRODUCT CODE: NORMAL
BH BB PRODUCT CODE: NORMAL
BH BB UNIT NUMBER: NORMAL
BH BB UNIT NUMBER: NORMAL
CROSSMATCH INTERPRETATION: NORMAL
CROSSMATCH INTERPRETATION: NORMAL
UNIT  ABO: NORMAL
UNIT  ABO: NORMAL
UNIT  RH: NORMAL
UNIT  RH: NORMAL

## 2024-05-18 NOTE — ED PROVIDER NOTES
EMERGENCY DEPARTMENT ENCOUNTER    Room Number:  S520/1  Date of encounter:  5/17/2024  PCP: Fanta Shankar MD  Historian: Patient  Chronic or social conditions impacting care (social determinants of health): Full code from home    HPI:  Chief Complaint: Shortness of breath  A complete HPI/ROS/PMH/PSH/SH/FH are unobtainable due to: Nothing    Context: Shaina Mcknight is a 89 y.o. female with a history of COPD.  She presents to the ED c/o acute shortness of breath for the past 2 weeks.  Patient complains of exertional dyspnea, cough, congestion.  She denies any overt fever, chest pain, pedal edema.  Patient states he has a difficult time walking from room to room without having to stop.    Review of prior external notes (non-ED):   I reviewed cardiology office visit from 5/23/2023.  Patient seen for aortic valve stenosis.    Review of prior external test results outside of this encounter:  I reviewed a CMP from 11/7/2023.  Creatinine 1.28, potassium 4.5    PAST MEDICAL HISTORY  Active Ambulatory Problems     Diagnosis Date Noted    TB (tuberculosis) 09/01/2017    Dyspnea on exertion 09/01/2017    Nonrheumatic aortic valve stenosis 09/01/2017    Community acquired pneumonia of right lower lobe of lung 11/04/2018    Leukocytosis 11/04/2018    Bacterial pneumonia 11/04/2018    Sepsis 11/04/2018    Esophageal dysphagia 03/25/2022    Rojas esophagus 2018    Anemia 1950    Zenker's diverticulum 03/25/2022    Elevated blood pressure reading without diagnosis of hypertension 04/01/2022    Bilateral carotid artery stenosis     First degree AV block 04/28/2022    Senile osteoporosis 07/07/2022    Syncope 04/19/2023    Paroxysmal SVT (supraventricular tachycardia) 05/23/2023     Resolved Ambulatory Problems     Diagnosis Date Noted    Acute respiratory failure with hypoxia 11/04/2018    Aortic valve regurgitation     Leukocytosis 03/28/2022    Ketosis 03/28/2022    Hyperglycemia 04/01/2022     Past Medical History:    Diagnosis Date    Acute pain of left shoulder due to trauma     Aortic valve stenosis     Cancer 1990    SPENCER (dyspnea on exertion)     Esophageal varices 2018    Fall     GERD (gastroesophageal reflux disease) 2018    Heart murmur     Mitral valve regurgitation     Pulmonary valve regurgitation     Tricuspid valve regurgitation     Tuberculosis          PAST SURGICAL HISTORY  Past Surgical History:   Procedure Laterality Date    CATARACT EXTRACTION Bilateral     HYSTERECTOMY      TONSILLECTOMY AND ADENOIDECTOMY      ZENKERS DIVERTICULECTOMY N/A 3/31/2022    Procedure: ZENKERS DIVERTICULECTOMY WITH MYOTOMY, ESOPHAGOSCOPY;  Surgeon: Chilango Louis III, MD;  Location: [facility] OR;  Service: Thoracic;  Laterality: N/A;         FAMILY HISTORY  Family History   Problem Relation Age of Onset    Cancer Mother         bladder    Heart attack Father     Heart disease Father     Sudden death Father     Leukemia Father     Heart attack Brother     Heart disease Brother     Sudden death Brother     Cancer Son          SOCIAL HISTORY  Social History     Socioeconomic History    Marital status:    Tobacco Use    Smoking status: Never    Smokeless tobacco: Never    Tobacco comments:     caffine use   Vaping Use    Vaping status: Never Used   Substance and Sexual Activity    Alcohol use: Yes     Alcohol/week: 5.0 standard drinks of alcohol     Types: 5 Glasses of wine per week     Comment: 4 ounces egg nog daily, occ sandrine    Drug use: No    Sexual activity: Defer         ALLERGIES  Molds & smuts, Penicillins, Tessalon [benzonatate], and Oyster shell        REVIEW OF SYSTEMS  All systems reviewed and negative except for those discussed in HPI.       PHYSICAL EXAM    I have reviewed the triage vital signs and nursing notes.    ED Triage Vitals   Temp Heart Rate Resp BP SpO2   05/06/24 1547 05/06/24 1547 05/06/24 1547 05/06/24 1601 05/06/24 1547   97.7 °F (36.5 °C) 106 22 143/59 93 %      Temp src Heart Rate Source  Patient Position BP Location FiO2 (%)   05/06/24 1547 05/06/24 1547 05/06/24 1647 05/06/24 1647 --   Tympanic Monitor Lying Right arm        Physical Exam  GENERAL: Alert, oriented, mild respiratory distress  HENT: head atraumatic, no nuchal rigidity  EYES: no scleral icterus, EOMI  CV: regular rhythm, regular rate, no murmur  RESPIRATORY: Distress, audibly wheezing  ABDOMEN: soft, nontender  MUSCULOSKELETAL: no deformity, FROM, no calf swelling or tenderness  NEURO: alert, moves all extremities, follows commands  SKIN: warm, dry        LAB RESULTS  No results found for this or any previous visit (from the past 24 hour(s)).    Ordered the above labs and independently reviewed the results.        RADIOLOGY  No Radiology Exams Resulted Within Past 24 Hours    I ordered the above noted radiological studies. Reviewed by me and discussed with radiologist.  See dictation for official radiology interpretation.      MEDICATIONS GIVEN IN ER    Medications   ipratropium-albuterol (DUO-NEB) nebulizer solution 3 mL (3 mL Nebulization Given 5/6/24 1748)   methylPREDNISolone sodium succinate (SOLU-Medrol) injection 125 mg (125 mg Intravenous Given 5/6/24 1732)         ADDITIONAL ORDERS CONSIDERED BUT NOT ORDERED:  Nothing      PROGRESS, DATA ANALYSIS, CONSULTS, AND MEDICAL DECISION MAKING    All labs have been independently interpreted by myself.  All radiology studies have been independently interpreted by myself and discussed with radiologist dictating the report.   EKG's independently interpreted by myself.  Discussion below represents my analysis of pertinent findings related to patient's condition, differential diagnosis, treatment plan and final disposition.    I have discussed case with Dr. Robertson, emergency room physician.  He has performed his own bedside examination and agrees with treatment plan.    ED Course as of 05/17/24 2144   Mon May 06, 2024   1650 Patient presents with a several week history of exertional  dyspnea, as well as a 2-day history of cough and congestion.  Patient audibly wheezing on exam and tachypneic.  Differential diagnoses include but not pneumonia, viral URI, CHF. [EE]   1657 Chest x-ray independently interpreted myself shows left interstitial prominence as well as a left infiltrate/atelectasis. [EE]   1711 EKG independently interpreted myself.  Time 1551.  Sinus rhythm, 83 bpm.  Normal P/DANYA.  QRS normal with left axis deviation.  Nonspecific T wave changes.  Similar to prior EKG from 4/19/2023. [EE]   1745 This was 600, 2 years ago [EE]   1746 Hemoglobin(!): 7.2  This was 10.7, 6 months ago [EE]   1748 Patient with significant drop in hemoglobin, as well as wheezing, tachypnea, likely CHF exacerbation.  She is Hemoccult positive without blood thinners.  She has stable vitals.  Patient will need to be admitted for further evaluation. [EE]   1803 Human Rhinovirus/Enterovirus(!): Detected [EE]   1810 I discussed case with Dr. Cruz.  She agrees to admit. [EE]      ED Course User Index  [EE] Omar Ortiz PA       AS OF 21:44 EDT VITALS:    BP - 133/48  HR - 84  TEMP - 97.3 °F (36.3 °C)  O2 SATS - 92%        DIAGNOSIS  Final diagnoses:   Symptomatic anemia   Acute bronchitis, unspecified organism   Acute on chronic congestive heart failure, unspecified heart failure type   JACLYN (acute kidney injury)   Nonrheumatic aortic valve stenosis   Rhinovirus         DISPOSITION  Admitted    Dictated utilizing Dragon dictation     Omar Ortiz PA  05/17/24 2202

## 2024-05-18 NOTE — THERAPY EVALUATION
Patient Name: Shaina Mcknight  : 10/4/1927    MRN: [id number]                              Today's Date: 2023       Admit Date: 2023    Visit Dx:     ICD-10-CM ICD-9-CM   1. Syncope, unspecified syncope type  R55 780.2   2. Leukocytosis, unspecified type  D72.829 288.60     Patient Active Problem List   Diagnosis   • TB (tuberculosis)   • Dyspnea on exertion   • Nonrheumatic aortic valve stenosis   • Community acquired pneumonia of right lower lobe of lung   • Leukocytosis   • Bacterial pneumonia   • Sepsis   • Esophageal dysphagia   • Rojas esophagus   • Anemia   • Zenker's diverticulum   • Elevated blood pressure reading without diagnosis of hypertension   • Bilateral carotid artery stenosis   • First degree AV block   • Senile osteoporosis   • Syncope     Past Medical History:   Diagnosis Date   • Acute pain of left shoulder due to trauma    • Anemia    • Aortic valve regurgitation     trace   • Aortic valve stenosis     mild   • Rojas esophagus    • Bilateral carotid artery stenosis    • Cancer 1990    some skin cancers   • SPENCER (dyspnea on exertion)    • Esophageal varices    • Fall     going up the steps   • GERD (gastroesophageal reflux disease)    • Heart murmur    • Mitral valve regurgitation     mild to moderate   • Pulmonary valve regurgitation     trace   • Syncope 2023   • Tricuspid valve regurgitation     mild to moderate     Past Surgical History:   Procedure Laterality Date   • CATARACT EXTRACTION Bilateral    • HYSTERECTOMY     • TONSILLECTOMY AND ADENOIDECTOMY     • ZENKERS DIVERTICULECTOMY N/A 3/31/2022    Procedure: ZENKERS DIVERTICULECTOMY WITH MYOTOMY, ESOPHAGOSCOPY;  Surgeon: Chilango Louis III, MD;  Location: [facility];  Service: Thoracic;  Laterality: N/A;      General Information     Row Name 23 [facility]          Physical Therapy Time and Intention    Document Type evaluation  -EM     Mode of Treatment individual therapy;physical therapy  -EM      Row Name 04/20/23 1038          General Information    Patient Profile Reviewed yes  -EM     Prior Level of Function independent:  -EM     Existing Precautions/Restrictions fall  -EM     Row Name 04/20/23 1038          Living Environment    People in Home child(mae), adult  -EM     Row Name 04/20/23 1038          Home Main Entrance    Number of Stairs, Main Entrance three  -EM     Row Name 04/20/23 1038          Stairs Within Home, Primary    Number of Stairs, Within Home, Primary none  -EM     Row Name 04/20/23 1038          Cognition    Orientation Status (Cognition) oriented x 3  -EM     Row Name 04/20/23 1038          Safety Issues, Functional Mobility    Impairments Affecting Function (Mobility) endurance/activity tolerance;strength;pain  -EM           User Key  (r) = Recorded By, (t) = Taken By, (c) = Cosigned By    Initials Name Provider Type    Monica Wesley, PT Physical Therapist               Mobility     Row Name 04/20/23 1040          Bed Mobility    Bed Mobility supine-sit;sit-supine  -EM     Supine-Sit [place] (Bed Mobility) minimum assist (75% patient effort)  -EM     Sit-Supine [place] (Bed Mobility) contact guard  -EM     Assistive Device (Bed Mobility) head of bed elevated  -EM     Row Name 04/20/23 1040          Sit-Stand Transfer    Sit-Stand [place] (Transfers) contact guard  -EM     Assistive Device (Sit-Stand Transfers) walker, front-wheeled  -EM     Row Name 04/20/23 1040          Gait/Stairs (Locomotion)    [place] Level (Gait) contact guard  -EM     Assistive Device (Gait) walker, front-wheeled  -EM     Distance in Feet (Gait) 250  -EM     Deviations/Abnormal Patterns (Gait) stride length decreased;gait speed decreased;antalgic  -EM     Comment, (Gait/Stairs) pt c/o chronic R hip pain that improves as she ambulates  -EM           User Key  (r) = Recorded By, (t) = Taken By, (c) = Cosigned By    Initials Name Provider Type    Monica Wesley PT  72.6 Physical Therapist               Obj/Interventions     Row Name 04/20/23 1041          Range of Motion Comprehensive    General Range of Motion other (see comments)  -EM     Comment, General Range of Motion R shoulder limited to about 90 flexion, chronic issue  -EM     Row Name 04/20/23 1041          Strength Comprehensive (MMT)    General Manual Muscle Testing (MMT) Assessment other (see comments)  -EM     Comment, General Manual Muscle Testing (MMT) Assessment no focal deficits identified  -EM     Row Name 04/20/23 1041          Balance    Balance Assessment sitting static balance;sitting dynamic balance;standing static balance;standing dynamic balance  -EM     Static Sitting Balance supervision  -EM     Dynamic Sitting Balance supervision  -EM     Position, Sitting Balance sitting edge of bed  -EM     Static Standing Balance contact guard  -EM     Dynamic Standing Balance contact guard  -EM     Position/Device Used, Standing Balance walker, rolling  -EM     Row Name 04/20/23 1041          Sensory Assessment (Somatosensory)    Sensory Assessment (Somatosensory) sensation intact  -EM           User Key  (r) = Recorded By, (t) = Taken By, (c) = Cosigned By    Initials Name Provider Type    EM Monica Carlisle, PT Physical Therapist               Goals/Plan     Row Name 04/20/23 1048          Bed Mobility Goal 1 (PT)    Activity/Assistive Device (Bed Mobility Goal 1, PT) bed mobility activities, all  -EM     [place] Level/Cues Needed (Bed Mobility Goal 1, PT) standby assist  -EM     Time Frame (Bed Mobility Goal 1, PT) 1 week  -EM     Row Name 04/20/23 1048          Transfer Goal 1 (PT)    Activity/Assistive Device (Transfer Goal 1, PT) transfers, all;walker, rolling  -EM     [place] Level/Cues Needed (Transfer Goal 1, PT) standby assist  -EM     Time Frame (Transfer Goal 1, PT) 1 week  -EM     Row Name 04/20/23 1052 04/20/23 1048       Gait Training Goal 1 (PT)    Activity/Assistive Device (Gait  Training Goal 1, PT) walker, rolling  -EM gait (walking locomotion)  -EM    [place] Level (Gait Training Goal 1, PT) standby assist  -EM --    Distance (Gait Training Goal 1, PT) 200  -EM --    Time Frame (Gait Training Goal 1, PT) 1 week  -EM --    Row Name 04/20/23 1052          Therapy Assessment/Plan (PT)    Planned Therapy Interventions (PT) bed mobility training;gait training;home exercise program;patient/family education;transfer training  -EM           User Key  (r) = Recorded By, (t) = Taken By, (c) = Cosigned By    Initials Name Provider Type    EM Monica Carlisle, PT Physical Therapist               Clinical Impression     Row Name 04/20/23 1045          Pain    Pretreatment Pain Rating 0/10 - no pain  -EM     Pre/Posttreatment Pain Comment no c/o acute pain, states she has chronic R hip pain  -EM     Pain Intervention(s) Repositioned  -EM     Row Name 04/20/23 1049          Plan of Care Review    Plan of Care Reviewed With patient  -EM     Outcome Evaluation Pt is 89 yo female seen in obs unit after syncopal episodes. PMH significant for HTN, cancer, AVR. Patient lives with son, independent without use of AD prior to admission. Today, patient awake and alert, family in room, agreeable to therapy. Patient performed bed mobility with CGA, sit to stand with CGA, ambulated all around nurses unit with rwx and CGA. Pt demonstrates impairments consisting of generalized weakness, decreased activity tolerance and may benefit from skilled PT. Patient safe to return home at current functional level, recommend use of rwx and f/u with HHPT.  -EM     Row Name 04/20/23 9925          Therapy Assessment/Plan (PT)    Patient/Family Therapy Goals Statement (PT) go home, get stronger  -EM     Rehab Potential (PT) good, to achieve stated therapy goals  -EM     Criteria for Skilled Interventions Met (PT) yes;skilled treatment is necessary  -EM     Therapy Frequency (PT) 3 times/wk  -EM     Row Name 04/20/23 0993           Positioning and Restraints    Pre-Treatment Position in bed  -EM     Post Treatment Position bed  -EM     In Bed supine;call light within reach;with family/caregiver;notified nsg  -EM           User Key  (r) = Recorded By, (t) = Taken By, (c) = Cosigned By    Initials Name Provider Type    Monica Wesley PT Physical Therapist               Outcome Measures     Row Name 04/20/23 1052          How much help from another person do you currently need...    Turning from your back to your side while in flat bed without using bedrails? 3  -EM     Moving from lying on back to sitting on the side of a flat bed without bedrails? 3  -EM     Moving to and from a bed to a chair (including a wheelchair)? 3  -EM     Standing up from a chair using your arms (e.g., wheelchair, bedside chair)? 3  -EM     Climbing 3-5 steps with a railing? 3  -EM     To walk in hospital room? 3  -EM     AM-[facility] 6 Clicks Score (PT) 18  -EM     Highest level of mobility 6 --> Walked 10 steps or more  -EM     Row Name 04/20/23 1052          Functional Assessment    Outcome Measure Options AM-PAC 6 Clicks Basic Mobility (PT)  -EM           User Key  (r) = Recorded By, (t) = Taken By, (c) = Cosigned By    Initials Name Provider Type    Monica Wesley PT Physical Therapist                             Physical Therapy Education     Title: PT OT SLP Therapies (In Progress)     Topic: Physical Therapy (In Progress)     Point: Mobility training (Done)     Learning Progress Summary           Patient Acceptance, E, VU by EM at 4/20/2023 1053                   Point: Home exercise program (Not Started)     Learner Progress:  Not documented in this visit.          Point: Body mechanics (Not Started)     Learner Progress:  Not documented in this visit.          Point: Precautions (Not Started)     Learner Progress:  Not documented in this visit.                      User Key     Initials Effective Dates Name Provider Type Discipline    EM  06/16/21 -  Monica Carlisle PT Physical Therapist PT              PT Recommendation and Plan  Planned Therapy Interventions (PT): bed mobility training, gait training, home exercise program, patient/family education, transfer training  Plan of Care Reviewed With: patient  Outcome Evaluation: Pt is 89 yo female seen in obs unit after syncopal episodes. PMH significant for HTN, cancer, AVR. Patient lives with son, independent without use of AD prior to admission. Today, patient awake and alert, family in room, agreeable to therapy. Patient performed bed mobility with CGA, sit to stand with CGA, ambulated all around nurses unit with rwx and CGA. Pt demonstrates impairments consisting of generalized weakness, decreased activity tolerance and may benefit from skilled PT. Patient safe to return home at current functional level, recommend use of rwx and f/u with HHPT.     Time Calculation:    PT Charges     Row Name 04/20/23 1053             Time Calculation    Start Time 0938  -EM      Stop Time 1002  -EM      Time Calculation (min) 24 min  -EM      PT Received On 04/20/23  -EM      PT - Next Appointment 04/21/23  -EM      PT Goal Re-Cert Due Date 04/27/23  -EM         Time Calculation- PT    Total Timed Code Minutes- PT 15 minute(s)  -EM         Timed Charges    46329 - PT Therapeutic Activity Minutes 15  -EM         Total Minutes    Timed Charges Total Minutes 15  -EM       Total Minutes 15  -EM            User Key  (r) = Recorded By, (t) = Taken By, (c) = Cosigned By    Initials Name Provider Type    EM Monica Carlisle PT Physical Therapist              Therapy Charges for Today     Code Description Service Date Service Provider Modifiers Qty    [id number]  PT THERAPEUTIC ACT EA 15 MIN 4/20/2023 Monica Carlisle, PT GP 1    [id number]  PT EVAL MOD COMPLEXITY 2 4/20/2023 Monica Carlisle, PT GP 1          PT G-Codes  Outcome Measure Options: AM-PAC 6 Clicks Basic Mobility (PT)  AM-PAC 6 Clicks  Score (PT): 18  PT Discharge Summary  Anticipated Discharge Disposition (PT): home with assist, home with home health    Monica Carlisle, PT  4/20/2023

## 2024-05-20 ENCOUNTER — LAB (OUTPATIENT)
Dept: LAB | Facility: HOSPITAL | Age: 89
End: 2024-05-20
Payer: MEDICARE

## 2024-05-20 ENCOUNTER — TRANSCRIBE ORDERS (OUTPATIENT)
Dept: LAB | Facility: HOSPITAL | Age: 89
End: 2024-05-20
Payer: MEDICARE

## 2024-05-20 DIAGNOSIS — D50.9 IRON DEFICIENCY ANEMIA, UNSPECIFIED IRON DEFICIENCY ANEMIA TYPE: ICD-10-CM

## 2024-05-20 DIAGNOSIS — D50.9 IRON DEFICIENCY ANEMIA, UNSPECIFIED IRON DEFICIENCY ANEMIA TYPE: Primary | ICD-10-CM

## 2024-05-20 LAB
BASOPHILS # BLD AUTO: 0.03 10*3/MM3 (ref 0–0.2)
BASOPHILS NFR BLD AUTO: 0.2 % (ref 0–1.5)
DEPRECATED RDW RBC AUTO: 46 FL (ref 37–54)
EOSINOPHIL # BLD AUTO: 0.35 10*3/MM3 (ref 0–0.4)
EOSINOPHIL NFR BLD AUTO: 2.1 % (ref 0.3–6.2)
ERYTHROCYTE [DISTWIDTH] IN BLOOD BY AUTOMATED COUNT: 13.2 % (ref 12.3–15.4)
HCT VFR BLD AUTO: 28.2 % (ref 34–46.6)
HGB BLD-MCNC: 9.2 G/DL (ref 12–15.9)
IMM GRANULOCYTES # BLD AUTO: 0.29 10*3/MM3 (ref 0–0.05)
IMM GRANULOCYTES NFR BLD AUTO: 1.8 % (ref 0–0.5)
LYMPHOCYTES # BLD AUTO: 2.89 10*3/MM3 (ref 0.7–3.1)
LYMPHOCYTES NFR BLD AUTO: 17.7 % (ref 19.6–45.3)
MCH RBC QN AUTO: 31.3 PG (ref 26.6–33)
MCHC RBC AUTO-ENTMCNC: 32.6 G/DL (ref 31.5–35.7)
MCV RBC AUTO: 95.9 FL (ref 79–97)
MONOCYTES # BLD AUTO: 1.12 10*3/MM3 (ref 0.1–0.9)
MONOCYTES NFR BLD AUTO: 6.9 % (ref 5–12)
NEUTROPHILS NFR BLD AUTO: 11.61 10*3/MM3 (ref 1.7–7)
NEUTROPHILS NFR BLD AUTO: 71.3 % (ref 42.7–76)
NRBC BLD AUTO-RTO: 0 /100 WBC (ref 0–0.2)
PLATELET # BLD AUTO: 200 10*3/MM3 (ref 140–450)
PMV BLD AUTO: 9.6 FL (ref 6–12)
RBC # BLD AUTO: 2.94 10*6/MM3 (ref 3.77–5.28)
WBC NRBC COR # BLD AUTO: 16.29 10*3/MM3 (ref 3.4–10.8)

## 2024-05-20 PROCEDURE — 36415 COLL VENOUS BLD VENIPUNCTURE: CPT

## 2024-05-20 PROCEDURE — 85025 COMPLETE CBC W/AUTO DIFF WBC: CPT

## 2024-10-15 ENCOUNTER — LAB (OUTPATIENT)
Dept: LAB | Facility: HOSPITAL | Age: 89
End: 2024-10-15
Payer: MEDICARE

## 2024-10-15 ENCOUNTER — TRANSCRIBE ORDERS (OUTPATIENT)
Dept: LAB | Facility: HOSPITAL | Age: 89
End: 2024-10-15
Payer: MEDICARE

## 2024-10-15 DIAGNOSIS — S40.011A HEMATOMA OF RIGHT SHOULDER: ICD-10-CM

## 2024-10-15 DIAGNOSIS — S40.011A HEMATOMA OF RIGHT SHOULDER: Primary | ICD-10-CM

## 2024-10-15 LAB
ALBUMIN SERPL-MCNC: 3.7 G/DL (ref 3.5–5.2)
ALBUMIN/GLOB SERPL: 1.1 G/DL
ALP SERPL-CCNC: 108 U/L (ref 39–117)
ALT SERPL W P-5'-P-CCNC: 15 U/L (ref 1–33)
ANION GAP SERPL CALCULATED.3IONS-SCNC: 9 MMOL/L (ref 5–15)
APTT PPP: 27.8 SECONDS (ref 22.7–35.4)
AST SERPL-CCNC: 24 U/L (ref 1–32)
BASOPHILS # BLD AUTO: 0.08 10*3/MM3 (ref 0–0.2)
BASOPHILS NFR BLD AUTO: 0.8 % (ref 0–1.5)
BILIRUB SERPL-MCNC: 0.3 MG/DL (ref 0–1.2)
BUN SERPL-MCNC: 23 MG/DL (ref 8–23)
BUN/CREAT SERPL: 14 (ref 7–25)
CALCIUM SPEC-SCNC: 9.2 MG/DL (ref 8.2–9.6)
CHLORIDE SERPL-SCNC: 102 MMOL/L (ref 98–107)
CO2 SERPL-SCNC: 26 MMOL/L (ref 22–29)
CREAT SERPL-MCNC: 1.64 MG/DL (ref 0.57–1)
DEPRECATED RDW RBC AUTO: 45.8 FL (ref 37–54)
EGFRCR SERPLBLD CKD-EPI 2021: 28.4 ML/MIN/1.73
EOSINOPHIL # BLD AUTO: 0.51 10*3/MM3 (ref 0–0.4)
EOSINOPHIL NFR BLD AUTO: 4.8 % (ref 0.3–6.2)
ERYTHROCYTE [DISTWIDTH] IN BLOOD BY AUTOMATED COUNT: 14.4 % (ref 12.3–15.4)
GLOBULIN UR ELPH-MCNC: 3.3 GM/DL
GLUCOSE SERPL-MCNC: 109 MG/DL (ref 65–99)
HCT VFR BLD AUTO: 32.1 % (ref 34–46.6)
HGB BLD-MCNC: 10.6 G/DL (ref 12–15.9)
IMM GRANULOCYTES # BLD AUTO: 0.05 10*3/MM3 (ref 0–0.05)
IMM GRANULOCYTES NFR BLD AUTO: 0.5 % (ref 0–0.5)
INR PPP: 1.06 (ref 0.9–1.1)
LYMPHOCYTES # BLD AUTO: 2.93 10*3/MM3 (ref 0.7–3.1)
LYMPHOCYTES NFR BLD AUTO: 27.5 % (ref 19.6–45.3)
MCH RBC QN AUTO: 28.9 PG (ref 26.6–33)
MCHC RBC AUTO-ENTMCNC: 33 G/DL (ref 31.5–35.7)
MCV RBC AUTO: 87.5 FL (ref 79–97)
MONOCYTES # BLD AUTO: 0.97 10*3/MM3 (ref 0.1–0.9)
MONOCYTES NFR BLD AUTO: 9.1 % (ref 5–12)
NEUTROPHILS NFR BLD AUTO: 57.3 % (ref 42.7–76)
NEUTROPHILS NFR BLD AUTO: 6.11 10*3/MM3 (ref 1.7–7)
NRBC BLD AUTO-RTO: 0 /100 WBC (ref 0–0.2)
PLATELET # BLD AUTO: 270 10*3/MM3 (ref 140–450)
PMV BLD AUTO: 9.8 FL (ref 6–12)
POTASSIUM SERPL-SCNC: 4.5 MMOL/L (ref 3.5–5.2)
PROT SERPL-MCNC: 7 G/DL (ref 6–8.5)
PROTHROMBIN TIME: 14 SECONDS (ref 11.7–14.2)
RBC # BLD AUTO: 3.67 10*6/MM3 (ref 3.77–5.28)
SODIUM SERPL-SCNC: 137 MMOL/L (ref 136–145)
WBC NRBC COR # BLD AUTO: 10.65 10*3/MM3 (ref 3.4–10.8)

## 2024-10-15 PROCEDURE — 85730 THROMBOPLASTIN TIME PARTIAL: CPT | Performed by: INTERNAL MEDICINE

## 2024-10-15 PROCEDURE — 85025 COMPLETE CBC W/AUTO DIFF WBC: CPT

## 2024-10-15 PROCEDURE — 80053 COMPREHEN METABOLIC PANEL: CPT

## 2024-10-15 PROCEDURE — 85610 PROTHROMBIN TIME: CPT

## 2024-10-15 PROCEDURE — 36415 COLL VENOUS BLD VENIPUNCTURE: CPT | Performed by: INTERNAL MEDICINE

## 2025-06-20 ENCOUNTER — LAB (OUTPATIENT)
Dept: LAB | Facility: HOSPITAL | Age: OVER 89
End: 2025-06-20
Payer: MEDICARE

## 2025-06-20 ENCOUNTER — TRANSCRIBE ORDERS (OUTPATIENT)
Dept: LAB | Facility: HOSPITAL | Age: OVER 89
End: 2025-06-20
Payer: MEDICARE

## 2025-06-20 DIAGNOSIS — M81.0 OSTEOPOROSIS, POST-MENOPAUSAL: ICD-10-CM

## 2025-06-20 DIAGNOSIS — N28.9 RENAL TROUBLE: ICD-10-CM

## 2025-06-20 DIAGNOSIS — I65.23 CAROTID STENOSIS, NON-SYMPTOMATIC, BILATERAL: ICD-10-CM

## 2025-06-20 DIAGNOSIS — K21.9 CHRONIC GERD: ICD-10-CM

## 2025-06-20 DIAGNOSIS — D50.9 IRON DEFICIENCY ANEMIA, UNSPECIFIED IRON DEFICIENCY ANEMIA TYPE: ICD-10-CM

## 2025-06-20 DIAGNOSIS — N28.9 RENAL TROUBLE: Primary | ICD-10-CM

## 2025-06-20 LAB
25(OH)D3 SERPL-MCNC: 85.6 NG/ML (ref 30–100)
ALBUMIN SERPL-MCNC: 3.8 G/DL (ref 3.5–5.2)
ALBUMIN/GLOB SERPL: 1.1 G/DL
ALP SERPL-CCNC: 105 U/L (ref 39–117)
ALT SERPL W P-5'-P-CCNC: 19 U/L (ref 1–33)
ANION GAP SERPL CALCULATED.3IONS-SCNC: 8.5 MMOL/L (ref 5–15)
AST SERPL-CCNC: 29 U/L (ref 1–32)
BACTERIA UR QL AUTO: ABNORMAL /HPF
BASOPHILS # BLD AUTO: 0.06 10*3/MM3 (ref 0–0.2)
BASOPHILS NFR BLD AUTO: 0.6 % (ref 0–1.5)
BILIRUB SERPL-MCNC: 0.5 MG/DL (ref 0–1.2)
BILIRUB UR QL STRIP: NEGATIVE
BUN SERPL-MCNC: 26 MG/DL (ref 8–23)
BUN/CREAT SERPL: 17.1 (ref 7–25)
CALCIUM SPEC-SCNC: 9.1 MG/DL (ref 8.2–9.6)
CHLORIDE SERPL-SCNC: 103 MMOL/L (ref 98–107)
CHOLEST SERPL-MCNC: 188 MG/DL (ref 0–200)
CLARITY UR: CLEAR
CO2 SERPL-SCNC: 25.5 MMOL/L (ref 22–29)
COLOR UR: YELLOW
CREAT SERPL-MCNC: 1.52 MG/DL (ref 0.57–1)
DEPRECATED RDW RBC AUTO: 44.7 FL (ref 37–54)
EGFRCR SERPLBLD CKD-EPI 2021: 31.1 ML/MIN/1.73
EOSINOPHIL # BLD AUTO: 0.44 10*3/MM3 (ref 0–0.4)
EOSINOPHIL NFR BLD AUTO: 4.7 % (ref 0.3–6.2)
ERYTHROCYTE [DISTWIDTH] IN BLOOD BY AUTOMATED COUNT: 13.1 % (ref 12.3–15.4)
FERRITIN SERPL-MCNC: 160 NG/ML (ref 13–150)
FOLATE SERPL-MCNC: >20 NG/ML (ref 4.78–24.2)
GLOBULIN UR ELPH-MCNC: 3.4 GM/DL
GLUCOSE SERPL-MCNC: 104 MG/DL (ref 65–99)
GLUCOSE UR STRIP-MCNC: NEGATIVE MG/DL
HCT VFR BLD AUTO: 35.5 % (ref 34–46.6)
HDLC SERPL-MCNC: 50 MG/DL (ref 40–60)
HGB BLD-MCNC: 11.8 G/DL (ref 12–15.9)
HGB UR QL STRIP.AUTO: NEGATIVE
HYALINE CASTS UR QL AUTO: ABNORMAL /LPF
IMM GRANULOCYTES # BLD AUTO: 0.03 10*3/MM3 (ref 0–0.05)
IMM GRANULOCYTES NFR BLD AUTO: 0.3 % (ref 0–0.5)
IRON 24H UR-MRATE: 107 MCG/DL (ref 37–145)
IRON 24H UR-MRATE: 107 MCG/DL (ref 37–145)
IRON SATN MFR SERPL: 30 % (ref 20–50)
KETONES UR QL STRIP: NEGATIVE
LDLC SERPL CALC-MCNC: 113 MG/DL (ref 0–100)
LDLC/HDLC SERPL: 2.2 {RATIO}
LEUKOCYTE ESTERASE UR QL STRIP.AUTO: ABNORMAL
LYMPHOCYTES # BLD AUTO: 2.48 10*3/MM3 (ref 0.7–3.1)
LYMPHOCYTES NFR BLD AUTO: 26.5 % (ref 19.6–45.3)
MAGNESIUM SERPL-MCNC: 2 MG/DL (ref 1.7–2.3)
MCH RBC QN AUTO: 30.9 PG (ref 26.6–33)
MCHC RBC AUTO-ENTMCNC: 33.2 G/DL (ref 31.5–35.7)
MCV RBC AUTO: 92.9 FL (ref 79–97)
MONOCYTES # BLD AUTO: 0.75 10*3/MM3 (ref 0.1–0.9)
MONOCYTES NFR BLD AUTO: 8 % (ref 5–12)
NEUTROPHILS NFR BLD AUTO: 5.59 10*3/MM3 (ref 1.7–7)
NEUTROPHILS NFR BLD AUTO: 59.9 % (ref 42.7–76)
NITRITE UR QL STRIP: NEGATIVE
NRBC BLD AUTO-RTO: 0 /100 WBC (ref 0–0.2)
PH UR STRIP.AUTO: 6.5 [PH] (ref 5–8)
PLATELET # BLD AUTO: 231 10*3/MM3 (ref 140–450)
PMV BLD AUTO: 10 FL (ref 6–12)
POTASSIUM SERPL-SCNC: 5 MMOL/L (ref 3.5–5.2)
PROT SERPL-MCNC: 7.2 G/DL (ref 6–8.5)
PROT UR QL STRIP: NEGATIVE
RBC # BLD AUTO: 3.82 10*6/MM3 (ref 3.77–5.28)
RBC # UR STRIP: ABNORMAL /HPF
REF LAB TEST METHOD: ABNORMAL
SODIUM SERPL-SCNC: 137 MMOL/L (ref 136–145)
SP GR UR STRIP: 1.01 (ref 1–1.03)
SQUAMOUS #/AREA URNS HPF: ABNORMAL /HPF
T4 FREE SERPL-MCNC: 1.16 NG/DL (ref 0.92–1.68)
TIBC SERPL-MCNC: 361 MCG/DL (ref 298–536)
TRANSFERRIN SERPL-MCNC: 242 MG/DL (ref 200–360)
TRIGL SERPL-MCNC: 141 MG/DL (ref 0–150)
TSH SERPL DL<=0.05 MIU/L-ACNC: 3.57 UIU/ML (ref 0.27–4.2)
UROBILINOGEN UR QL STRIP: ABNORMAL
VIT B12 BLD-MCNC: 816 PG/ML (ref 211–946)
VLDLC SERPL-MCNC: 25 MG/DL (ref 5–40)
WBC # UR STRIP: ABNORMAL /HPF
WBC NRBC COR # BLD AUTO: 9.35 10*3/MM3 (ref 3.4–10.8)

## 2025-06-20 PROCEDURE — 80053 COMPREHEN METABOLIC PANEL: CPT

## 2025-06-20 PROCEDURE — 84439 ASSAY OF FREE THYROXINE: CPT

## 2025-06-20 PROCEDURE — 84443 ASSAY THYROID STIM HORMONE: CPT

## 2025-06-20 PROCEDURE — 82746 ASSAY OF FOLIC ACID SERUM: CPT

## 2025-06-20 PROCEDURE — 81001 URINALYSIS AUTO W/SCOPE: CPT

## 2025-06-20 PROCEDURE — 82728 ASSAY OF FERRITIN: CPT

## 2025-06-20 PROCEDURE — 36415 COLL VENOUS BLD VENIPUNCTURE: CPT

## 2025-06-20 PROCEDURE — 85025 COMPLETE CBC W/AUTO DIFF WBC: CPT

## 2025-06-20 PROCEDURE — 87086 URINE CULTURE/COLONY COUNT: CPT

## 2025-06-20 PROCEDURE — 83540 ASSAY OF IRON: CPT

## 2025-06-20 PROCEDURE — 84466 ASSAY OF TRANSFERRIN: CPT

## 2025-06-20 PROCEDURE — 82607 VITAMIN B-12: CPT

## 2025-06-20 PROCEDURE — 83735 ASSAY OF MAGNESIUM: CPT

## 2025-06-20 PROCEDURE — 80061 LIPID PANEL: CPT

## 2025-06-20 PROCEDURE — 82306 VITAMIN D 25 HYDROXY: CPT

## 2025-06-21 LAB — BACTERIA SPEC AEROBE CULT: NO GROWTH

## (undated) DEVICE — JACKSON-PRATT 100CC BULB RESERVOIR: Brand: CARDINAL HEALTH

## (undated) DEVICE — SUT GUT CHRM 3/0 SH 27IN G122H

## (undated) DEVICE — 3M™ IOBAN™ 2 ANTIMICROBIAL INCISE DRAPE 6640EZ: Brand: IOBAN™ 2

## (undated) DEVICE — Device

## (undated) DEVICE — TBG PENCL TELESCP MEGADYNE SMOKE EVAC 10FT

## (undated) DEVICE — PATIENT RETURN ELECTRODE, SINGLE-USE, CONTACT QUALITY MONITORING, ADULT, WITH 9FT CORD, FOR PATIENTS WEIGING OVER 33LBS. (15KG): Brand: MEGADYNE

## (undated) DEVICE — DRN WND JP RND W TROC SIL 15F 3/16IN

## (undated) DEVICE — GLV SURG PREMIERPRO ORTHO LTX PF SZ8 BRN

## (undated) DEVICE — ANTIBACTERIAL UNDYED BRAIDED (POLYGLACTIN 910), SYNTHETIC ABSORBABLE SUTURE: Brand: COATED VICRYL

## (undated) DEVICE — APPL DURAPREP IODOPHOR APL 26ML

## (undated) DEVICE — SCANLAN® SUTURE BOOT™ INSTRUMENT JAW COVERS - ORIGINAL YELLOW, STANDARD PKG (5 PAIR/CARTRIDGE, 1 CARTRIDGE/PKG): Brand: SCANLAN® SUTURE BOOT™ INSTRUMENT JAW COVERS

## (undated) DEVICE — SUT SILK 2/0 FS BLK 18IN 685G

## (undated) DEVICE — SUT SILK 4/0 TIES 18IN A183H

## (undated) DEVICE — SPNG DISECTOR KTNER XRAY COTN 1/4X9/16IN PK/5

## (undated) DEVICE — LOU MINOR PROCEDURE: Brand: MEDLINE INDUSTRIES, INC.

## (undated) DEVICE — SUT SILK 3/0 SH CR5 18IN C0135

## (undated) DEVICE — SUT PROLN 5/0 RB1 D/A 36IN 8556H

## (undated) DEVICE — SUT SILK 3/0 TIES 18IN A184H

## (undated) DEVICE — TRAP FLD MINIVAC MEGADYNE 100ML

## (undated) DEVICE — ADHS SKIN SURG TISS VISC PREMIERPRO EXOFIN HI/VISC FAST/DRY